# Patient Record
Sex: FEMALE | Race: WHITE | NOT HISPANIC OR LATINO | Employment: FULL TIME | URBAN - METROPOLITAN AREA
[De-identification: names, ages, dates, MRNs, and addresses within clinical notes are randomized per-mention and may not be internally consistent; named-entity substitution may affect disease eponyms.]

---

## 2017-07-03 ENCOUNTER — TELEPHONE (OUTPATIENT)
Dept: OBSTETRICS AND GYNECOLOGY | Facility: CLINIC | Age: 42
End: 2017-07-03

## 2017-07-03 DIAGNOSIS — Z30.41 ENCOUNTER FOR SURVEILLANCE OF CONTRACEPTIVE PILLS: Primary | ICD-10-CM

## 2017-07-03 RX ORDER — NORGESTIMATE AND ETHINYL ESTRADIOL 7DAYSX3 28
1 KIT ORAL DAILY
Qty: 84 TABLET | Refills: 0 | Status: SHIPPED | OUTPATIENT
Start: 2017-07-03 | End: 2017-11-20 | Stop reason: SDUPTHER

## 2017-07-03 NOTE — TELEPHONE ENCOUNTER
Refill request  For norg ee 3 packs; no refill    Well woman exam due in September    [phone]

## 2017-11-20 ENCOUNTER — OFFICE VISIT (OUTPATIENT)
Dept: OBSTETRICS AND GYNECOLOGY | Facility: CLINIC | Age: 42
End: 2017-11-20
Payer: COMMERCIAL

## 2017-11-20 VITALS
DIASTOLIC BLOOD PRESSURE: 109 MMHG | SYSTOLIC BLOOD PRESSURE: 171 MMHG | WEIGHT: 152.44 LBS | BODY MASS INDEX: 28.05 KG/M2 | HEIGHT: 62 IN

## 2017-11-20 DIAGNOSIS — Z30.41 ENCOUNTER FOR SURVEILLANCE OF CONTRACEPTIVE PILLS: ICD-10-CM

## 2017-11-20 DIAGNOSIS — Z12.31 SCREENING MAMMOGRAM, ENCOUNTER FOR: ICD-10-CM

## 2017-11-20 DIAGNOSIS — Z01.419 ENCOUNTER FOR GYNECOLOGICAL EXAMINATION (GENERAL) (ROUTINE) WITHOUT ABNORMAL FINDINGS: Primary | ICD-10-CM

## 2017-11-20 DIAGNOSIS — Z12.4 SCREENING FOR CERVICAL CANCER: ICD-10-CM

## 2017-11-20 PROCEDURE — 99999 PR PBB SHADOW E&M-EST. PATIENT-LVL II: CPT | Mod: PBBFAC,,, | Performed by: OBSTETRICS & GYNECOLOGY

## 2017-11-20 PROCEDURE — 99396 PREV VISIT EST AGE 40-64: CPT | Mod: S$GLB,,, | Performed by: OBSTETRICS & GYNECOLOGY

## 2017-11-20 PROCEDURE — 88175 CYTOPATH C/V AUTO FLUID REDO: CPT

## 2017-11-20 RX ORDER — LISINOPRIL 10 MG/1
10 TABLET ORAL
COMMUNITY
Start: 2017-07-28

## 2017-11-20 RX ORDER — NORGESTIMATE AND ETHINYL ESTRADIOL 7DAYSX3 28
1 KIT ORAL
COMMUNITY
Start: 2016-06-04 | End: 2019-02-11 | Stop reason: SDUPTHER

## 2017-11-20 RX ORDER — CITALOPRAM 10 MG/1
10 TABLET ORAL
COMMUNITY
Start: 2017-07-28 | End: 2020-06-15

## 2017-11-20 RX ORDER — NORGESTIMATE AND ETHINYL ESTRADIOL 7DAYSX3 28
1 KIT ORAL DAILY
Qty: 84 TABLET | Refills: 3 | Status: SHIPPED | OUTPATIENT
Start: 2017-11-20 | End: 2018-02-12

## 2017-11-20 NOTE — PROGRESS NOTES
Subjective:       Patient ID: Serina Barriga is a 42 y.o. female.    Chief Complaint:  Annual Exam      History of Present Illness  HPI  Annual Exam-Premenopausal  Patient presents for annual exam. The patient has no complaints today. The patient is sexually active--no pelvic pain. GYN screening history: last pap: approximate date  and was normal and last mammogram: approximate date  and was normal. The patient wears seatbelts: yes. The patient participates in regular exercise: yes.--walk 15-20 min; twice a day;  Has the patient ever been transfused or tattooed?: yes--tattooes; . The patient reports that there is not domestic violence in her life.    Menses with bleeding intermenstrually; spots for 1 day; menses q 28 d; flow3 days--pads-super; change q3-4 hr; worsening dysmenorrhea--sometimes every other months--relief with midol--worse on right side            GYN & OB HistoryPatient's last menstrual period was 2017 (approximate).   Date of Last Pap: No result found    OB History    Para Term  AB Living   2 2 2     2   SAB TAB Ectopic Multiple Live Births           2      # Outcome Date GA Lbr Doug/2nd Weight Sex Delivery Anes PTL Lv   2 Term      Vag-Spont   YUE   1 Term      Vag-Spont   YUE          Review of Systems  Review of Systems   Constitutional: Negative for activity change, appetite change, chills, diaphoresis, fatigue, fever and unexpected weight change.   HENT: Negative for mouth sores and tinnitus.    Eyes: Negative for discharge and visual disturbance.   Respiratory: Negative for cough, shortness of breath and wheezing.    Cardiovascular: Negative for chest pain, palpitations and leg swelling.   Gastrointestinal: Negative for abdominal pain, bloating, blood in stool, constipation, diarrhea, nausea and vomiting.   Endocrine: Negative for diabetes, hair loss, hot flashes, hyperthyroidism and hypothyroidism.   Genitourinary: Positive for menstrual problem. Negative for  decreased libido, dyspareunia, dysuria, flank pain, frequency, genital sores, hematuria, menorrhagia, pelvic pain, urgency, vaginal bleeding, vaginal discharge, vaginal pain, dysmenorrhea, urinary incontinence, postcoital bleeding, postmenopausal bleeding and vaginal odor.   Musculoskeletal: Negative for back pain and myalgias.   Skin:  Negative for rash, no acne and hair changes.   Neurological: Negative for seizures, syncope, numbness and headaches.   Hematological: Negative for adenopathy. Does not bruise/bleed easily.   Psychiatric/Behavioral: Negative for depression and sleep disturbance. The patient is not nervous/anxious.    Breast: Negative for breast mass, breast pain, nipple discharge and skin changes          Objective:    Physical Exam:   Constitutional: She appears well-developed.     Eyes: Conjunctivae and EOM are normal. Pupils are equal, round, and reactive to light.    Neck: Normal range of motion. Neck supple.     Pulmonary/Chest: Effort normal. Right breast exhibits no mass, no nipple discharge, no skin change and no tenderness. Left breast exhibits no mass, no nipple discharge, no skin change and no tenderness. Breasts are symmetrical.        Abdominal: Soft.     Genitourinary: Rectum normal and uterus normal. Pelvic exam was performed with patient supine. Cervix is normal. Right adnexum displays no mass and no tenderness. Left adnexum displays no mass and no tenderness. No erythema, bleeding, rectocele, cystocele or unspecified prolapse of vaginal walls in the vagina. Vaginal discharge (menstrual like blood in vagina) found. Labial bartholins normal.       Uterus Size: 6 cm   Musculoskeletal: Normal range of motion.       Neurological: She is alert.    Skin: Skin is warm.    Psychiatric: She has a normal mood and affect.          Assessment:     Encounter Diagnoses   Name Primary?    Encounter for gynecological examination (general) (routine) without abnormal findings Yes    Screening for  cervical cancer     Screening mammogram, encounter for     Encounter for surveillance of contraceptive pills              Plan:      Continue annual well woman exam.  Pap today, if wnl, due on 2020  Continue ocp as directed  [facility] for [facility]

## 2017-11-29 ENCOUNTER — TELEPHONE (OUTPATIENT)
Dept: OBSTETRICS AND GYNECOLOGY | Facility: CLINIC | Age: 42
End: 2017-11-29

## 2018-06-08 ENCOUNTER — TELEPHONE (OUTPATIENT)
Dept: OBSTETRICS AND GYNECOLOGY | Facility: CLINIC | Age: 43
End: 2018-06-08

## 2018-06-08 NOTE — TELEPHONE ENCOUNTER
Attempted to call pt, voicemail not setup, unable to leave message.     Calling concerning the status of Rx for birth control pills. Said the pharmacy called in the request.

## 2018-06-08 NOTE — TELEPHONE ENCOUNTER
----- Message from Rosie Borja sent at 6/8/2018 12:09 PM CDT -----  Contact: patient  Calling concerning the status of Rx for birth control pills. Said the pharmacy called in the request. Please call patient today ASAP. States she is leaving out of town today. Call [phone]. Thanks, ashley      [id number] [place]  [address] [facility]  [address] [facility] [address]  Phone: [phone] Fax: [phone]

## 2019-02-08 DIAGNOSIS — Z30.41 ENCOUNTER FOR SURVEILLANCE OF CONTRACEPTIVE PILLS: ICD-10-CM

## 2019-02-08 RX ORDER — NORGESTIMATE AND ETHINYL ESTRADIOL 7DAYSX3 28
1 KIT ORAL DAILY
Qty: 84 TABLET | Refills: 2 | OUTPATIENT
Start: 2019-02-08 | End: 2019-05-03

## 2019-02-11 ENCOUNTER — TELEPHONE (OUTPATIENT)
Dept: OBSTETRICS AND GYNECOLOGY | Facility: CLINIC | Age: 44
End: 2019-02-11

## 2019-02-11 DIAGNOSIS — Z30.41 ENCOUNTER FOR SURVEILLANCE OF CONTRACEPTIVE PILLS: Primary | ICD-10-CM

## 2019-02-11 RX ORDER — NORGESTIMATE AND ETHINYL ESTRADIOL 7DAYSX3 28
1 KIT ORAL DAILY
Qty: 28 TABLET | Refills: 1 | Status: SHIPPED | OUTPATIENT
Start: 2019-02-11 | End: 2019-02-18 | Stop reason: SDUPTHER

## 2019-02-11 NOTE — TELEPHONE ENCOUNTER
----- Message from Shu Denny sent at 2/11/2019  8:51 AM CST -----  Contact: self  Pt is requesting a refill on norgestimate-ethinyl estradiol (ORTHO TRI-CYCLEN,TRI-SPRINTEC) 0.18/0.215/0.25 mg-35 mcg (28) tablet. Pt is using [facility]/pharmacy #[id number] - [place], [place] - [address] [address] [facility].    Pt can be reached at [phone].    Thank you

## 2019-02-11 NOTE — TELEPHONE ENCOUNTER
Spoke to pt,     Pt is requesting a refill on norgestimate-ethinyl estradiol (ORTHO TRI-CYCLEN,TRI-SPRINTEC) 0.18/0.215/0.25 mg-35 mcg (28) tablet. Pt is using [facility]/pharmacy #[id number] - [place], [place] - [address] [address] [facility].      Pt states she will run out of pills on Sat 2/16/19. Annual is scheduled for 2/18/19. Will send message to Dr. Sanon.     Pt verbalized understanding.

## 2019-02-18 ENCOUNTER — OFFICE VISIT (OUTPATIENT)
Dept: OBSTETRICS AND GYNECOLOGY | Facility: CLINIC | Age: 44
End: 2019-02-18
Payer: COMMERCIAL

## 2019-02-18 VITALS
DIASTOLIC BLOOD PRESSURE: 78 MMHG | BODY MASS INDEX: 27.99 KG/M2 | SYSTOLIC BLOOD PRESSURE: 132 MMHG | WEIGHT: 152.13 LBS | HEIGHT: 62 IN

## 2019-02-18 DIAGNOSIS — Z01.419 ENCOUNTER FOR GYNECOLOGICAL EXAMINATION (GENERAL) (ROUTINE) WITHOUT ABNORMAL FINDINGS: Primary | ICD-10-CM

## 2019-02-18 DIAGNOSIS — Z30.41 ENCOUNTER FOR SURVEILLANCE OF CONTRACEPTIVE PILLS: ICD-10-CM

## 2019-02-18 DIAGNOSIS — Z12.4 SCREENING FOR CERVICAL CANCER: ICD-10-CM

## 2019-02-18 DIAGNOSIS — Z12.31 SCREENING MAMMOGRAM, ENCOUNTER FOR: ICD-10-CM

## 2019-02-18 PROCEDURE — 99999 PR PBB SHADOW E&M-EST. PATIENT-LVL III: ICD-10-PCS | Mod: PBBFAC,,, | Performed by: OBSTETRICS & GYNECOLOGY

## 2019-02-18 PROCEDURE — 99396 PREV VISIT EST AGE 40-64: CPT | Mod: S$GLB,,, | Performed by: OBSTETRICS & GYNECOLOGY

## 2019-02-18 PROCEDURE — 99396 PR PREVENTIVE VISIT,EST,40-64: ICD-10-PCS | Mod: S$GLB,,, | Performed by: OBSTETRICS & GYNECOLOGY

## 2019-02-18 PROCEDURE — 99999 PR PBB SHADOW E&M-EST. PATIENT-LVL III: CPT | Mod: PBBFAC,,, | Performed by: OBSTETRICS & GYNECOLOGY

## 2019-02-18 RX ORDER — NORGESTIMATE AND ETHINYL ESTRADIOL 7DAYSX3 28
1 KIT ORAL
COMMUNITY
Start: 2016-06-04 | End: 2019-02-18 | Stop reason: SDUPTHER

## 2019-02-18 RX ORDER — NORGESTIMATE AND ETHINYL ESTRADIOL 7DAYSX3 28
1 KIT ORAL DAILY
Qty: 84 TABLET | Refills: 3 | Status: SHIPPED | OUTPATIENT
Start: 2019-02-18 | End: 2020-02-03

## 2019-02-18 RX ORDER — LISINOPRIL 10 MG/1
10 TABLET ORAL
COMMUNITY
Start: 2018-10-04 | End: 2019-02-18 | Stop reason: SDUPTHER

## 2019-02-18 NOTE — PROGRESS NOTES
Subjective:       Patient ID: Serina Barriga is a 43 y.o. female.    Chief Complaint:  Well Woman      History of Present Illness  HPI  Annual Exam-Premenopausal  Patient presents for annual exam. The patient has no complaints today. The patient is sexually active---denies pelvic pain--celebrating 21 yrs of marriage!. GYN screening history: last pap: approximate date 2017 and was normal and last mammogram: approximate date 2018 and was normal. The patient wears seatbelts: yes. The patient participates in regular exercise: yes--ymca, yoga 2 /wk; cycling; . Has the patient ever been transfused or tattooed?: yes.--tattooe;  The patient reports that there is not domestic violence in her life.    Menses monthly; flow 5-6 days with 2-3 days of spotting; reports spotting on 3rd wk of pill pack; pads-reg; q 3 hrs; min dysmenorrhea;     Denies bladder leakage  GYN & OB History  Patient's last menstrual period was 2019 (approximate).   Date of Last Pap: 2017    OB History    Para Term  AB Living   2 2 2     2   SAB TAB Ectopic Multiple Live Births           2      # Outcome Date GA Lbr Doug/2nd Weight Sex Delivery Anes PTL Lv   2 Term      Vag-Spont   YUE   1 Term      Vag-Spont   YUE          Review of Systems  Review of Systems   Genitourinary: Positive for menstrual problem.   All other systems reviewed and are negative.          Objective:      Physical Exam:   Constitutional: She appears well-developed.     Eyes: Conjunctivae and EOM are normal. Pupils are equal, round, and reactive to light.    Neck: Normal range of motion. Neck supple.     Pulmonary/Chest: Effort normal. Right breast exhibits no mass, no nipple discharge, no skin change and no tenderness. Left breast exhibits no mass, no nipple discharge, no skin change and no tenderness. Breasts are symmetrical.        Abdominal: Soft.     Genitourinary: Rectum normal, vagina normal and uterus normal. Pelvic exam was performed with  patient supine. Cervix is normal. Right adnexum displays no mass and no tenderness. Left adnexum displays no mass and no tenderness. No erythema, bleeding, rectocele, cystocele or unspecified prolapse of vaginal walls in the vagina. No vaginal discharge found. Labial bartholins normal.Cervix exhibits no motion tenderness and no friability. Also,  no recent pap smear         Uterus Size: 6 cm   Musculoskeletal: Normal range of motion.       Neurological: She is alert.    Skin: Skin is warm.    Psychiatric: She has a normal mood and affect.           Assessment:     Encounter Diagnoses   Name Primary?    Encounter for surveillance of contraceptive pills     Encounter for gynecological examination (general) (routine) without abnormal findings Yes    Screening for cervical cancer     Screening mammogram, encounter for                  Plan:      Continue annual well woman exam.  Pap 2017, due in 2020; Reviewed updated recommendations for pap smears (every 3 years) in low risk patients.   Recommend annual pelvic exams.  Reviewed recommendations for annual CBE.  mammo ordered, continue yearly until age 76  Continue diet, exercise, weight loss   continue ocp as directed; pt to stop active pills if she starts early withdrawal bleed and start new pack of pills on Sunday  If abnl bleeding persists can change ocp or will need pelvic sono

## 2020-02-02 DIAGNOSIS — Z30.41 ENCOUNTER FOR SURVEILLANCE OF CONTRACEPTIVE PILLS: ICD-10-CM

## 2020-02-03 RX ORDER — NORGESTIMATE AND ETHINYL ESTRADIOL 7DAYSX3 28
1 KIT ORAL DAILY
Qty: 84 TABLET | Refills: 0 | Status: SHIPPED | OUTPATIENT
Start: 2020-02-03 | End: 2020-04-29

## 2020-04-29 DIAGNOSIS — Z30.41 ENCOUNTER FOR SURVEILLANCE OF CONTRACEPTIVE PILLS: ICD-10-CM

## 2020-04-29 RX ORDER — NORGESTIMATE AND ETHINYL ESTRADIOL 7DAYSX3 28
1 KIT ORAL DAILY
Qty: 84 TABLET | Refills: 0 | Status: SHIPPED | OUTPATIENT
Start: 2020-04-29 | End: 2020-06-15 | Stop reason: SDUPTHER

## 2020-04-29 NOTE — TELEPHONE ENCOUNTER
Spoke with pt. Notified pt that rx has been sent and assisted pt in scheduling ww exam in June. Pt verbalized understanding of appt date and time. NORI Viera

## 2020-06-15 ENCOUNTER — OFFICE VISIT (OUTPATIENT)
Dept: OBSTETRICS AND GYNECOLOGY | Facility: CLINIC | Age: 45
End: 2020-06-15
Payer: COMMERCIAL

## 2020-06-15 VITALS
HEIGHT: 62 IN | DIASTOLIC BLOOD PRESSURE: 74 MMHG | WEIGHT: 145.5 LBS | SYSTOLIC BLOOD PRESSURE: 126 MMHG | BODY MASS INDEX: 26.78 KG/M2

## 2020-06-15 DIAGNOSIS — Z30.41 ENCOUNTER FOR SURVEILLANCE OF CONTRACEPTIVE PILLS: ICD-10-CM

## 2020-06-15 DIAGNOSIS — Z12.31 SCREENING MAMMOGRAM, ENCOUNTER FOR: ICD-10-CM

## 2020-06-15 DIAGNOSIS — Z01.419 ENCOUNTER FOR GYNECOLOGICAL EXAMINATION (GENERAL) (ROUTINE) WITHOUT ABNORMAL FINDINGS: Primary | ICD-10-CM

## 2020-06-15 DIAGNOSIS — Z12.4 SCREENING FOR CERVICAL CANCER: ICD-10-CM

## 2020-06-15 PROCEDURE — 99396 PR PREVENTIVE VISIT,EST,40-64: ICD-10-PCS | Mod: S$GLB,,, | Performed by: OBSTETRICS & GYNECOLOGY

## 2020-06-15 PROCEDURE — 88175 CYTOPATH C/V AUTO FLUID REDO: CPT

## 2020-06-15 PROCEDURE — 87624 HPV HI-RISK TYP POOLED RSLT: CPT

## 2020-06-15 PROCEDURE — 99999 PR PBB SHADOW E&M-EST. PATIENT-LVL II: ICD-10-PCS | Mod: PBBFAC,,, | Performed by: OBSTETRICS & GYNECOLOGY

## 2020-06-15 PROCEDURE — 99999 PR PBB SHADOW E&M-EST. PATIENT-LVL II: CPT | Mod: PBBFAC,,, | Performed by: OBSTETRICS & GYNECOLOGY

## 2020-06-15 PROCEDURE — 99396 PREV VISIT EST AGE 40-64: CPT | Mod: S$GLB,,, | Performed by: OBSTETRICS & GYNECOLOGY

## 2020-06-15 RX ORDER — SERTRALINE HYDROCHLORIDE 25 MG/1
25 TABLET, FILM COATED ORAL DAILY
COMMUNITY
Start: 2020-05-22

## 2020-06-15 RX ORDER — NORGESTIMATE AND ETHINYL ESTRADIOL 7DAYSX3 28
1 KIT ORAL DAILY
Qty: 90 TABLET | Refills: 4 | Status: SHIPPED | OUTPATIENT
Start: 2020-06-15 | End: 2021-07-07 | Stop reason: SDUPTHER

## 2020-06-15 NOTE — PROGRESS NOTES
Subjective:       Patient ID: Serina Barriga is a 45 y.o. female.    Chief Complaint:  No chief complaint on file.      History of Present Illness  HPI  Annual Exam-Premenopausal  Patient presents for annual exam. The patient has no complaints today. The patient is sexually active. GYN screening history: last pap: approximate date  and was normal and last mammogram: was normal and patient does not recall when last mammogram was done. The patient wears seatbelts: yes. The patient participates in regular exercise: yes.--lost on weight watchers, plans to resume--yoga and walks dog; ;  Has the patient ever been transfused or tattooed?: yes--+tatooe. The patient reports that there is not domestic violence in her life.    Menses monthly on ocp, flow 5 days, reg; change q3-4 hrs; no double up; tolerable dysmenorrhea;           GYN & OB History  Patient's last menstrual period was 2020.   Date of Last Pap: 2017    OB History    Para Term  AB Living   2 2 2     2   SAB TAB Ectopic Multiple Live Births           2      # Outcome Date GA Lbr Doug/2nd Weight Sex Delivery Anes PTL Lv   2 Term      Vag-Spont   YUE   1 Term      Vag-Spont   YUE       Review of Systems  Review of Systems   All other systems reviewed and are negative.          Objective:      Physical Exam:   Constitutional: She appears well-developed.     Eyes: Pupils are equal, round, and reactive to light. Conjunctivae and EOM are normal.    Neck: Normal range of motion. Neck supple.     Pulmonary/Chest: Effort normal. Right breast exhibits no mass, no nipple discharge, no skin change and no tenderness. Left breast exhibits no mass, no nipple discharge, no skin change and no tenderness. Breasts are symmetrical.        Abdominal: Soft.     Genitourinary:    Vagina, uterus, right adnexa, left adnexa and rectum normal.      Pelvic exam was performed with patient supine.   Cervix is normal. There is a normal right adnexa and a  normal left adnexa. Right adnexum displays no mass and no tenderness. Left adnexum displays no mass and no tenderness. No erythema, bleeding, rectocele, cystocele or unspecified prolapse of vaginal walls in the vagina. Labial bartholins normal.Cervix exhibits friability.        Uterus Size: 6 cm   Musculoskeletal: Normal range of motion.       Neurological: She is alert.    Skin: Skin is warm.    Psychiatric: She has a normal mood and affect.           Assessment:        Encounter Diagnoses   Name Primary?    Encounter for gynecological examination (general) (routine) without abnormal findings Yes    Screening for cervical cancer     Encounter for surveillance of contraceptive pills     Screening mammogram, encounter for                Plan:      Continue annual well woman exam.  Pap today; Reviewed updated recommendations for pap smears (every 3 years) in low risk patients.   Recommend annual pelvic exams.  Reviewed recommendations for annual CBE.  mammo recommended, continue yearly until age 76; [facility] at [facility]  Continue diet, exercise, weight loss  Continue ocp as directed  Continue menstrual calendar

## 2020-06-19 LAB
FINAL PATHOLOGIC DIAGNOSIS: NORMAL
Lab: NORMAL

## 2020-06-22 LAB
HPV HR 12 DNA SPEC QL NAA+PROBE: NEGATIVE
HPV16 AG SPEC QL: NEGATIVE
HPV18 DNA SPEC QL NAA+PROBE: NEGATIVE

## 2021-06-22 DIAGNOSIS — Z30.41 ENCOUNTER FOR SURVEILLANCE OF CONTRACEPTIVE PILLS: ICD-10-CM

## 2021-06-23 RX ORDER — NORGESTIMATE AND ETHINYL ESTRADIOL 7DAYSX3 28
KIT ORAL
Qty: 84 TABLET | Refills: 4 | OUTPATIENT
Start: 2021-06-23

## 2021-07-06 ENCOUNTER — PATIENT MESSAGE (OUTPATIENT)
Dept: OBSTETRICS AND GYNECOLOGY | Facility: CLINIC | Age: 46
End: 2021-07-06

## 2021-07-06 DIAGNOSIS — Z30.41 ENCOUNTER FOR SURVEILLANCE OF CONTRACEPTIVE PILLS: ICD-10-CM

## 2021-07-07 DIAGNOSIS — Z30.41 ENCOUNTER FOR SURVEILLANCE OF CONTRACEPTIVE PILLS: ICD-10-CM

## 2021-07-07 RX ORDER — NORGESTIMATE AND ETHINYL ESTRADIOL 7DAYSX3 28
1 KIT ORAL DAILY
Qty: 90 TABLET | Refills: 0 | Status: SHIPPED | OUTPATIENT
Start: 2021-07-07 | End: 2021-09-20 | Stop reason: SDUPTHER

## 2021-07-08 RX ORDER — NORGESTIMATE AND ETHINYL ESTRADIOL 7DAYSX3 28
1 KIT ORAL DAILY
Qty: 90 TABLET | Refills: 4 | OUTPATIENT
Start: 2021-07-08 | End: 2022-07-08

## 2021-09-16 ENCOUNTER — PATIENT MESSAGE (OUTPATIENT)
Dept: OBSTETRICS AND GYNECOLOGY | Facility: CLINIC | Age: 46
End: 2021-09-16

## 2021-09-16 DIAGNOSIS — Z30.41 ENCOUNTER FOR SURVEILLANCE OF CONTRACEPTIVE PILLS: ICD-10-CM

## 2021-09-20 RX ORDER — NORGESTIMATE AND ETHINYL ESTRADIOL 7DAYSX3 28
1 KIT ORAL DAILY
Qty: 90 TABLET | Refills: 0 | Status: SHIPPED | OUTPATIENT
Start: 2021-09-20 | End: 2021-09-30 | Stop reason: SDUPTHER

## 2021-09-30 ENCOUNTER — OFFICE VISIT (OUTPATIENT)
Dept: OBSTETRICS AND GYNECOLOGY | Facility: CLINIC | Age: 46
End: 2021-09-30
Payer: COMMERCIAL

## 2021-09-30 VITALS
SYSTOLIC BLOOD PRESSURE: 112 MMHG | BODY MASS INDEX: 29.17 KG/M2 | DIASTOLIC BLOOD PRESSURE: 62 MMHG | HEIGHT: 62 IN | WEIGHT: 158.5 LBS

## 2021-09-30 DIAGNOSIS — Z30.41 ENCOUNTER FOR SURVEILLANCE OF CONTRACEPTIVE PILLS: ICD-10-CM

## 2021-09-30 DIAGNOSIS — Z01.419 ENCOUNTER FOR GYNECOLOGICAL EXAMINATION (GENERAL) (ROUTINE) WITHOUT ABNORMAL FINDINGS: Primary | ICD-10-CM

## 2021-09-30 DIAGNOSIS — Z12.31 SCREENING MAMMOGRAM, ENCOUNTER FOR: ICD-10-CM

## 2021-09-30 DIAGNOSIS — Z12.4 SCREENING FOR CERVICAL CANCER: ICD-10-CM

## 2021-09-30 PROCEDURE — 3078F PR MOST RECENT DIASTOLIC BLOOD PRESSURE < 80 MM HG: ICD-10-PCS | Mod: CPTII,S$GLB,, | Performed by: OBSTETRICS & GYNECOLOGY

## 2021-09-30 PROCEDURE — 3074F PR MOST RECENT SYSTOLIC BLOOD PRESSURE < 130 MM HG: ICD-10-PCS | Mod: CPTII,S$GLB,, | Performed by: OBSTETRICS & GYNECOLOGY

## 2021-09-30 PROCEDURE — 99396 PREV VISIT EST AGE 40-64: CPT | Mod: S$GLB,,, | Performed by: OBSTETRICS & GYNECOLOGY

## 2021-09-30 PROCEDURE — 3078F DIAST BP <80 MM HG: CPT | Mod: CPTII,S$GLB,, | Performed by: OBSTETRICS & GYNECOLOGY

## 2021-09-30 PROCEDURE — 99999 PR PBB SHADOW E&M-EST. PATIENT-LVL III: ICD-10-PCS | Mod: PBBFAC,,, | Performed by: OBSTETRICS & GYNECOLOGY

## 2021-09-30 PROCEDURE — 99999 PR PBB SHADOW E&M-EST. PATIENT-LVL III: CPT | Mod: PBBFAC,,, | Performed by: OBSTETRICS & GYNECOLOGY

## 2021-09-30 PROCEDURE — 4010F ACE/ARB THERAPY RXD/TAKEN: CPT | Mod: CPTII,S$GLB,, | Performed by: OBSTETRICS & GYNECOLOGY

## 2021-09-30 PROCEDURE — 3074F SYST BP LT 130 MM HG: CPT | Mod: CPTII,S$GLB,, | Performed by: OBSTETRICS & GYNECOLOGY

## 2021-09-30 PROCEDURE — 1160F RVW MEDS BY RX/DR IN RCRD: CPT | Mod: CPTII,S$GLB,, | Performed by: OBSTETRICS & GYNECOLOGY

## 2021-09-30 PROCEDURE — 1160F PR REVIEW ALL MEDS BY PRESCRIBER/CLIN PHARMACIST DOCUMENTED: ICD-10-PCS | Mod: CPTII,S$GLB,, | Performed by: OBSTETRICS & GYNECOLOGY

## 2021-09-30 PROCEDURE — 4010F PR ACE/ARB THEARPY RXD/TAKEN: ICD-10-PCS | Mod: CPTII,S$GLB,, | Performed by: OBSTETRICS & GYNECOLOGY

## 2021-09-30 PROCEDURE — 3008F PR BODY MASS INDEX (BMI) DOCUMENTED: ICD-10-PCS | Mod: CPTII,S$GLB,, | Performed by: OBSTETRICS & GYNECOLOGY

## 2021-09-30 PROCEDURE — 1159F MED LIST DOCD IN RCRD: CPT | Mod: CPTII,S$GLB,, | Performed by: OBSTETRICS & GYNECOLOGY

## 2021-09-30 PROCEDURE — 99396 PR PREVENTIVE VISIT,EST,40-64: ICD-10-PCS | Mod: S$GLB,,, | Performed by: OBSTETRICS & GYNECOLOGY

## 2021-09-30 PROCEDURE — 1159F PR MEDICATION LIST DOCUMENTED IN MEDICAL RECORD: ICD-10-PCS | Mod: CPTII,S$GLB,, | Performed by: OBSTETRICS & GYNECOLOGY

## 2021-09-30 PROCEDURE — 3008F BODY MASS INDEX DOCD: CPT | Mod: CPTII,S$GLB,, | Performed by: OBSTETRICS & GYNECOLOGY

## 2021-09-30 RX ORDER — NORGESTIMATE AND ETHINYL ESTRADIOL 7DAYSX3 28
1 KIT ORAL DAILY
Qty: 84 TABLET | Refills: 3 | Status: SHIPPED | OUTPATIENT
Start: 2021-09-30 | End: 2022-11-17 | Stop reason: SDUPTHER

## 2022-11-17 ENCOUNTER — TELEPHONE (OUTPATIENT)
Dept: OBSTETRICS AND GYNECOLOGY | Facility: CLINIC | Age: 47
End: 2022-11-17
Payer: COMMERCIAL

## 2022-11-17 DIAGNOSIS — Z30.41 ENCOUNTER FOR SURVEILLANCE OF CONTRACEPTIVE PILLS: ICD-10-CM

## 2022-11-17 RX ORDER — NORGESTIMATE AND ETHINYL ESTRADIOL 7DAYSX3 28
1 KIT ORAL DAILY
Qty: 84 TABLET | Refills: 0 | Status: SHIPPED | OUTPATIENT
Start: 2022-11-17 | End: 2022-11-29 | Stop reason: SDUPTHER

## 2022-11-17 NOTE — TELEPHONE ENCOUNTER
----- Message from Yadira Jolley LPN sent at 11/16/2022  4:03 PM CST -----  Regarding: bc refill  Pt needs refill to hold her over, sched annual with Gini 11/29. States she will be out of bc by then.

## 2022-11-29 ENCOUNTER — OFFICE VISIT (OUTPATIENT)
Dept: OBSTETRICS AND GYNECOLOGY | Facility: CLINIC | Age: 47
End: 2022-11-29
Payer: COMMERCIAL

## 2022-11-29 VITALS
SYSTOLIC BLOOD PRESSURE: 132 MMHG | BODY MASS INDEX: 28.21 KG/M2 | WEIGHT: 153.31 LBS | DIASTOLIC BLOOD PRESSURE: 88 MMHG | HEIGHT: 62 IN

## 2022-11-29 DIAGNOSIS — Z12.11 SCREENING FOR COLON CANCER: ICD-10-CM

## 2022-11-29 DIAGNOSIS — Z01.419 ENCOUNTER FOR GYNECOLOGICAL EXAMINATION WITHOUT ABNORMAL FINDING: Primary | ICD-10-CM

## 2022-11-29 DIAGNOSIS — Z30.41 ENCOUNTER FOR SURVEILLANCE OF CONTRACEPTIVE PILLS: ICD-10-CM

## 2022-11-29 DIAGNOSIS — R10.31 RLQ ABDOMINAL PAIN: ICD-10-CM

## 2022-11-29 DIAGNOSIS — Z12.39 ENCOUNTER FOR SCREENING FOR MALIGNANT NEOPLASM OF BREAST, UNSPECIFIED SCREENING MODALITY: ICD-10-CM

## 2022-11-29 PROBLEM — I10 PRIMARY HYPERTENSION: Status: ACTIVE | Noted: 2022-11-29

## 2022-11-29 PROBLEM — F33.1 MODERATE EPISODE OF RECURRENT MAJOR DEPRESSIVE DISORDER: Status: ACTIVE | Noted: 2022-11-29

## 2022-11-29 PROCEDURE — 4010F PR ACE/ARB THEARPY RXD/TAKEN: ICD-10-PCS | Mod: CPTII,S$GLB,, | Performed by: NURSE PRACTITIONER

## 2022-11-29 PROCEDURE — 4010F ACE/ARB THERAPY RXD/TAKEN: CPT | Mod: CPTII,S$GLB,, | Performed by: NURSE PRACTITIONER

## 2022-11-29 PROCEDURE — 99999 PR PBB SHADOW E&M-EST. PATIENT-LVL IV: CPT | Mod: PBBFAC,,, | Performed by: NURSE PRACTITIONER

## 2022-11-29 PROCEDURE — 3079F DIAST BP 80-89 MM HG: CPT | Mod: CPTII,S$GLB,, | Performed by: NURSE PRACTITIONER

## 2022-11-29 PROCEDURE — 1159F PR MEDICATION LIST DOCUMENTED IN MEDICAL RECORD: ICD-10-PCS | Mod: CPTII,S$GLB,, | Performed by: NURSE PRACTITIONER

## 2022-11-29 PROCEDURE — 3008F BODY MASS INDEX DOCD: CPT | Mod: CPTII,S$GLB,, | Performed by: NURSE PRACTITIONER

## 2022-11-29 PROCEDURE — 99396 PREV VISIT EST AGE 40-64: CPT | Mod: S$GLB,,, | Performed by: NURSE PRACTITIONER

## 2022-11-29 PROCEDURE — 3008F PR BODY MASS INDEX (BMI) DOCUMENTED: ICD-10-PCS | Mod: CPTII,S$GLB,, | Performed by: NURSE PRACTITIONER

## 2022-11-29 PROCEDURE — 99999 PR PBB SHADOW E&M-EST. PATIENT-LVL IV: ICD-10-PCS | Mod: PBBFAC,,, | Performed by: NURSE PRACTITIONER

## 2022-11-29 PROCEDURE — 3075F PR MOST RECENT SYSTOLIC BLOOD PRESS GE 130-139MM HG: ICD-10-PCS | Mod: CPTII,S$GLB,, | Performed by: NURSE PRACTITIONER

## 2022-11-29 PROCEDURE — 88175 CYTOPATH C/V AUTO FLUID REDO: CPT | Performed by: NURSE PRACTITIONER

## 2022-11-29 PROCEDURE — 3075F SYST BP GE 130 - 139MM HG: CPT | Mod: CPTII,S$GLB,, | Performed by: NURSE PRACTITIONER

## 2022-11-29 PROCEDURE — 3079F PR MOST RECENT DIASTOLIC BLOOD PRESSURE 80-89 MM HG: ICD-10-PCS | Mod: CPTII,S$GLB,, | Performed by: NURSE PRACTITIONER

## 2022-11-29 PROCEDURE — 87624 HPV HI-RISK TYP POOLED RSLT: CPT | Performed by: NURSE PRACTITIONER

## 2022-11-29 PROCEDURE — 1160F PR REVIEW ALL MEDS BY PRESCRIBER/CLIN PHARMACIST DOCUMENTED: ICD-10-PCS | Mod: CPTII,S$GLB,, | Performed by: NURSE PRACTITIONER

## 2022-11-29 PROCEDURE — 1160F RVW MEDS BY RX/DR IN RCRD: CPT | Mod: CPTII,S$GLB,, | Performed by: NURSE PRACTITIONER

## 2022-11-29 PROCEDURE — 99396 PR PREVENTIVE VISIT,EST,40-64: ICD-10-PCS | Mod: S$GLB,,, | Performed by: NURSE PRACTITIONER

## 2022-11-29 PROCEDURE — 1159F MED LIST DOCD IN RCRD: CPT | Mod: CPTII,S$GLB,, | Performed by: NURSE PRACTITIONER

## 2022-11-29 RX ORDER — NORGESTIMATE AND ETHINYL ESTRADIOL 7DAYSX3 28
1 KIT ORAL DAILY
Qty: 84 TABLET | Refills: 4 | Status: SHIPPED | OUTPATIENT
Start: 2022-11-29 | End: 2022-12-23

## 2022-11-29 NOTE — PROGRESS NOTES
Subjective:       Patient ID: Serina Barriga is a 47 y.o. female.    Chief Complaint:  Annual Exam      History of Present Illness  HPI  Annual Exam-Premenopausal   presents for annual exam.  The patient is sexually active with her huxband x25y; no complaints.. GYN screening history: last pap: approximate date 6/15/2020 and was normal and last mammogram: approximate date 2017 and was normal.  Normal pap hx.  The patient wears seatbelts: yes. The patient participates in regular exercise: yes. Has the patient ever been transfused or tattooed?: yes. X1 tattoo.  The patient reports that there is not domestic violence in her life.    RLQ pain since September; hx of cysts twice; dull pain, daily; 11/10-stabbing pain RLQ with spotting; menses started 3d later than usual and was heavier for two days only; experienced again  debilitating sharp pain; no dyspareunia; no n/v/d/c; no appetite changes.  Appetite and heating pad help.    Menses the last week of pills x4d; very light flow with no cramping.  Needs refill on pills.    GYN & OB History  Patient's last menstrual period was 2022 (exact date).   Date of Last Pap: 2020    OB History    Para Term  AB Living   2 2 2     2   SAB IAB Ectopic Multiple Live Births           2      # Outcome Date GA Lbr Doug/2nd Weight Sex Delivery Anes PTL Lv   2 Term      Vag-Spont   YUE   1 Term      Vag-Spont   YUE       Review of Systems  Review of Systems   Constitutional:  Negative for activity change, appetite change, chills, fatigue and fever.   HENT:  Negative for nasal congestion and mouth sores.    Respiratory:  Negative for cough, shortness of breath and wheezing.    Cardiovascular:  Negative for chest pain.   Gastrointestinal:  Negative for abdominal pain, constipation, diarrhea, nausea and vomiting.   Endocrine: Negative for hair loss and hot flashes.   Genitourinary:  Positive for menstrual problem and pelvic pain. Negative for bladder  incontinence, decreased libido, dysmenorrhea, dyspareunia, dysuria, frequency, genital sores, menorrhagia, urgency, vaginal discharge, vaginal pain, urinary incontinence, postcoital bleeding and vaginal odor.   Musculoskeletal:  Negative for back pain.   Integumentary:  Negative for breast mass, nipple discharge, breast skin changes and breast tenderness.   Neurological:  Negative for headaches.   Hematological:  Negative for adenopathy.   Psychiatric/Behavioral:  Negative for depression. The patient is not nervous/anxious.    All other systems reviewed and are negative.  Breast: Negative for breast self exam, lump, mass, mastodynia, nipple discharge, skin changes and tenderness        Objective:      Physical Exam:   Constitutional: She is oriented to person, place, and time. She appears well-developed and well-nourished. No distress.    HENT:   Head: Normocephalic and atraumatic.   Nose: Nose normal.    Eyes: Pupils are equal, round, and reactive to light. Conjunctivae and EOM are normal. Right eye exhibits no discharge. Left eye exhibits no discharge.     Cardiovascular:  Normal rate, regular rhythm and normal heart sounds.      Exam reveals no gallop, no friction rub, no clubbing, no cyanosis and no edema.       No murmur heard.   Pulmonary/Chest: Effort normal and breath sounds normal. No respiratory distress. She has no decreased breath sounds. She has no wheezes. She has no rhonchi. She has no rales. She exhibits no tenderness. Right breast exhibits no inverted nipple, no mass, no nipple discharge, no skin change, no tenderness, no bleeding and no swelling. Left breast exhibits no inverted nipple, no mass, no nipple discharge, no skin change, no tenderness, no bleeding and no swelling. Breasts are symmetrical.        Abdominal: Soft. Bowel sounds are normal. She exhibits no distension. There is no abdominal tenderness. There is no rebound and no guarding. Hernia confirmed negative in the right inguinal area  and confirmed negative in the left inguinal area.     Genitourinary:    Inguinal canal, vagina, uterus and right adnexa normal.   Rectum:      No external hemorrhoid.      Pelvic exam was performed with patient supine.   The external female genitalia was normal.   No external genitalia lesions identified,Genitalia hair distrobution normal .   Labial bartholins normal.There is no rash, tenderness, lesion or injury on the right labia. There is no rash, tenderness, lesion or injury on the left labia. Cervix is normal. Right adnexum displays no mass, no tenderness and no fullness. Left adnexum displays fullness. Left adnexum displays no mass and no tenderness. No erythema,  no vaginal discharge, tenderness or bleeding in the vagina.    No foreign body in the vagina.      No signs of injury in the vagina.   Vagina was moist.Cervix exhibits no motion tenderness, no lesion, no discharge, no friability, no lesion, no tenderness and no polyp.    pap smear completedUerus contour normal  Uterus is not enlarged and not tender. Uterus size: 10 cm.Normal urethral meatus.Urethral Meatus exhibits: urethral lesion and prolapsedUrethra findings: no urethral mass, no tenderness and no urethral scarringBladder findings: no bladder distention and no bladder tenderness          Musculoskeletal: Normal range of motion and moves all extremeties.      Lymphadenopathy: No inguinal adenopathy noted on the right or left side.    Neurological: She is alert and oriented to person, place, and time.    Skin: Skin is warm and dry. No rash noted. She is not diaphoretic. No cyanosis or erythema. No pallor. Nails show no clubbing.    Psychiatric: She has a normal mood and affect. Her speech is normal and behavior is normal. Judgment and thought content normal.           Assessment:        1. Encounter for gynecological examination without abnormal finding    2. Encounter for screening for malignant neoplasm of breast, unspecified screening modality     3. Encounter for surveillance of contraceptive pills    4. RLQ abdominal pain    5. Screening for colon cancer               Plan:   Discussed risks of DVT development with birth control use.  Pt denies history of blood clots, DVT, cardiac issues, HTN, CVA, gallbladder disease, liver disease, smoking, migraines with an aura, or adrenal disease.  Pt denies family hx of DVT.    Refill sent for tri sprintec    U/s and mmg ordered  Methods to manage pain discussed -- NSAIDs with food, warm soaks/compresses.    Discussed colonoscopy and cologard with risks and benefits; pt interested in cologard; orders entered    Reviewed updated recommendations for pap smears (every 3 years) in low risk patients.  Recommend annual pelvic exams.  Reviewed recommendations for annual CBE.  Next pap due in 2025.   RTC 1 year or sooner prn.  To PCP for other health maintenance.      Encounter for gynecological examination without abnormal finding  -     Mammo Digital Screening Bilat w/ Lee; Future; Expected date: 11/29/2022  -     norgestimate-ethinyl estradioL (ORTHO TRI-CYCLEN,TRI-SPRINTEC) 0.18/0.215/0.25 mg-35 mcg (28) tablet; Take 1 tablet by mouth once daily.  Dispense: 84 tablet; Refill: 4  -     US Pelvis Comp with Transvag NON-OB (xpd; Future; Expected date: 11/29/2022    Encounter for screening for malignant neoplasm of breast, unspecified screening modality  -     Mammo Digital Screening Bilat w/ Lee; Future; Expected date: 11/29/2022    Encounter for surveillance of contraceptive pills  -     norgestimate-ethinyl estradioL (ORTHO TRI-CYCLEN,TRI-SPRINTEC) 0.18/0.215/0.25 mg-35 mcg (28) tablet; Take 1 tablet by mouth once daily.  Dispense: 84 tablet; Refill: 4    RLQ abdominal pain  -     US Pelvis Comp with Transvag NON-OB (xpd; Future; Expected date: 11/29/2022    Screening for colon cancer  -     Cologuard Screening (Multitarget Stool DNA); Future; Expected date: 11/29/2022

## 2022-12-02 ENCOUNTER — HOSPITAL ENCOUNTER (OUTPATIENT)
Dept: RADIOLOGY | Facility: HOSPITAL | Age: 47
Discharge: HOME | End: 2022-12-02
Attending: NURSE PRACTITIONER
Payer: COMMERCIAL

## 2022-12-02 DIAGNOSIS — Z01.419 ENCOUNTER FOR GYNECOLOGICAL EXAMINATION WITHOUT ABNORMAL FINDING: ICD-10-CM

## 2022-12-02 DIAGNOSIS — R10.31 RLQ ABDOMINAL PAIN: ICD-10-CM

## 2022-12-02 PROCEDURE — 76830 TRANSVAGINAL US NON-OB: CPT | Mod: 26,,, | Performed by: RADIOLOGY

## 2022-12-02 PROCEDURE — 76856 US EXAM PELVIC COMPLETE: CPT | Mod: TC

## 2022-12-02 PROCEDURE — 76856 US PELVIS COMP WITH TRANSVAG NON-OB (XPD): ICD-10-PCS | Mod: 26,,, | Performed by: RADIOLOGY

## 2022-12-02 PROCEDURE — 76830 US PELVIS COMP WITH TRANSVAG NON-OB (XPD): ICD-10-PCS | Mod: 26,,, | Performed by: RADIOLOGY

## 2022-12-02 PROCEDURE — 76856 US EXAM PELVIC COMPLETE: CPT | Mod: 26,,, | Performed by: RADIOLOGY

## 2022-12-05 LAB
FINAL PATHOLOGIC DIAGNOSIS: NORMAL
Lab: NORMAL

## 2023-01-06 ENCOUNTER — OFFICE VISIT (OUTPATIENT)
Dept: OBSTETRICS AND GYNECOLOGY | Facility: CLINIC | Age: 48
End: 2023-01-06
Payer: COMMERCIAL

## 2023-01-06 VITALS
DIASTOLIC BLOOD PRESSURE: 80 MMHG | WEIGHT: 151.88 LBS | BODY MASS INDEX: 27.95 KG/M2 | SYSTOLIC BLOOD PRESSURE: 150 MMHG | HEIGHT: 62 IN

## 2023-01-06 DIAGNOSIS — D21.9 FIBROIDS: ICD-10-CM

## 2023-01-06 DIAGNOSIS — N83.201 RIGHT OVARIAN CYST: ICD-10-CM

## 2023-01-06 DIAGNOSIS — R10.30 LOWER ABDOMINAL PAIN: Primary | ICD-10-CM

## 2023-01-06 PROCEDURE — 3079F PR MOST RECENT DIASTOLIC BLOOD PRESSURE 80-89 MM HG: ICD-10-PCS | Mod: CPTII,S$GLB,, | Performed by: OBSTETRICS & GYNECOLOGY

## 2023-01-06 PROCEDURE — 1160F RVW MEDS BY RX/DR IN RCRD: CPT | Mod: CPTII,S$GLB,, | Performed by: OBSTETRICS & GYNECOLOGY

## 2023-01-06 PROCEDURE — 99999 PR PBB SHADOW E&M-EST. PATIENT-LVL III: CPT | Mod: PBBFAC,,, | Performed by: OBSTETRICS & GYNECOLOGY

## 2023-01-06 PROCEDURE — 1159F MED LIST DOCD IN RCRD: CPT | Mod: CPTII,S$GLB,, | Performed by: OBSTETRICS & GYNECOLOGY

## 2023-01-06 PROCEDURE — 3077F SYST BP >= 140 MM HG: CPT | Mod: CPTII,S$GLB,, | Performed by: OBSTETRICS & GYNECOLOGY

## 2023-01-06 PROCEDURE — 3008F BODY MASS INDEX DOCD: CPT | Mod: CPTII,S$GLB,, | Performed by: OBSTETRICS & GYNECOLOGY

## 2023-01-06 PROCEDURE — 1160F PR REVIEW ALL MEDS BY PRESCRIBER/CLIN PHARMACIST DOCUMENTED: ICD-10-PCS | Mod: CPTII,S$GLB,, | Performed by: OBSTETRICS & GYNECOLOGY

## 2023-01-06 PROCEDURE — 3079F DIAST BP 80-89 MM HG: CPT | Mod: CPTII,S$GLB,, | Performed by: OBSTETRICS & GYNECOLOGY

## 2023-01-06 PROCEDURE — 3077F PR MOST RECENT SYSTOLIC BLOOD PRESSURE >= 140 MM HG: ICD-10-PCS | Mod: CPTII,S$GLB,, | Performed by: OBSTETRICS & GYNECOLOGY

## 2023-01-06 PROCEDURE — 99999 PR PBB SHADOW E&M-EST. PATIENT-LVL III: ICD-10-PCS | Mod: PBBFAC,,, | Performed by: OBSTETRICS & GYNECOLOGY

## 2023-01-06 PROCEDURE — 99214 OFFICE O/P EST MOD 30 MIN: CPT | Mod: S$GLB,,, | Performed by: OBSTETRICS & GYNECOLOGY

## 2023-01-06 PROCEDURE — 3008F PR BODY MASS INDEX (BMI) DOCUMENTED: ICD-10-PCS | Mod: CPTII,S$GLB,, | Performed by: OBSTETRICS & GYNECOLOGY

## 2023-01-06 PROCEDURE — 99214 PR OFFICE/OUTPT VISIT, EST, LEVL IV, 30-39 MIN: ICD-10-PCS | Mod: S$GLB,,, | Performed by: OBSTETRICS & GYNECOLOGY

## 2023-01-06 PROCEDURE — 1159F PR MEDICATION LIST DOCUMENTED IN MEDICAL RECORD: ICD-10-PCS | Mod: CPTII,S$GLB,, | Performed by: OBSTETRICS & GYNECOLOGY

## 2023-01-06 NOTE — PROGRESS NOTES
Subjective:       Patient ID: Serina Barriga is a 47 y.o. female.    Chief Complaint:  Abdominal Pain      History of Present Illness  Abdominal Pain  Patient presents for evaluation of abdominal pain. The pain is described as cramping and sharp, and is 4/10 in intensity currently, but has reached 10/10 on several occasions. Pain is located in the RLQ area without radiation. Onset was sudden occurring 2 months ago. Symptoms have been gradually worsening since. Aggravating factors: movement and pressure. Alleviating factors: none. Associated symptoms: constipation. The patient denies anorexia, diarrhea, dysuria, fever, hematochezia, hematuria, nausea, and vomiting. Risk factors for pelvic/abdominal pain include prior ovarian cyst rupture and uterine fibroids.  Pt reports normally having 1 BM per day but has noted that her pattern changed to 1 BM every 3 days since the pains started.  BM have been very hard and painful to pass.  Pt normally sees Dr. Sanon but has not been able to get an appointment with her.  This has resulted in pt being seen by NP on 11/29/22.  US at that visit showed uterine fibroids and a 3.8 cm right ovarian simple cyst.  Pt also had an evaluation with her PCP who ordered CT scan.  CT on 12/20 showed a 2.5 cm right ovarian.  Pt also saw Dr. Javier at [facility] on 12/27 for same.  Dr. Javier switched her OCP to a continuous monophasic pill.  Reports increased back pains since.  She was recommended to have an EMB, however, pt is confused by this recommendations.  Menses are regular with periods lasting 5 days.  Bleeding has been heavier over the past several months.  Pt has grown frustrated with lack of progress and would like another opinion.  Pt also feels that her uterus has grown significantly since her last ultrasound and fears that she may have formed a new cyst on her left ovary.  Last pap NILM in 2022.  Is sexually active and reports no issues with intercourse since pains started.    was present for interview and stepped out at pt request for examination.  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      GYN & OB History  Patient's last menstrual period was 2022 (exact date).   Date of Last Pap: 2022    OB History    Para Term  AB Living   2 2 2     2   SAB IAB Ectopic Multiple Live Births           2      # Outcome Date GA Lbr Doug/2nd Weight Sex Delivery Anes PTL Lv   2 Term      Vag-Spont   YUE   1 Term      Vag-Spont   YUE       Review of Systems  Review of Systems   Constitutional:  Negative for activity change, appetite change, chills, fatigue, fever and unexpected weight change.   Respiratory:  Negative for shortness of breath.    Cardiovascular:  Negative for chest pain, palpitations and leg swelling.   Gastrointestinal:  Positive for abdominal pain and constipation. Negative for bloating, blood in stool, diarrhea, nausea and vomiting.   Genitourinary:  Positive for flank pain, menorrhagia and pelvic pain. Negative for dysmenorrhea, dyspareunia, dysuria, frequency, genital sores, hematuria, menstrual problem, urgency, vaginal bleeding, vaginal discharge, vaginal pain, urinary incontinence, postcoital bleeding, vaginal dryness and vaginal odor.   Musculoskeletal:  Positive for back pain.   Integumentary:  Negative for rash.   Neurological:  Negative for syncope and headaches.         Objective:    Physical Exam:   Constitutional: She is oriented to person, place, and time. She appears well-developed and well-nourished. No distress.       Cardiovascular:  Normal rate and regular rhythm.             Pulmonary/Chest: Effort normal and breath sounds normal.        Abdominal: Soft. Bowel sounds are normal. She exhibits no distension and no mass. There is abdominal tenderness in the right lower quadrant and left lower quadrant. There is no rebound, no guarding, no tenderness at McBurney's point and negative Mayfield's sign. No hernia.     Genitourinary:     Vagina normal.      Pelvic exam was performed with patient supine.   There is no rash, tenderness, lesion or injury on the right labia. There is no rash, tenderness, lesion or injury on the left labia. Cervix is normal. Right adnexum displays tenderness. Right adnexum displays no mass and no fullness. Left adnexum displays tenderness. Left adnexum displays no mass and no fullness. No erythema,  no vaginal discharge, tenderness or bleeding in the vagina.    No foreign body in the vagina.      No signs of injury in the vagina.   Cervix exhibits no motion tenderness, no discharge and no friability. Uterus is enlarged. Uterus is not deviated and not tender. Uterus size: 8 cm.          Musculoskeletal: Normal range of motion and moves all extremeties. No tenderness or edema.       Neurological: She is alert and oriented to person, place, and time.    Skin: Skin is warm and dry.    Psychiatric: She has a normal mood and affect. Her behavior is normal. Thought content normal.       US Pelvis Comp with Transvag NON-OB (xpd  Narrative: EXAMINATION:  US PELVIS COMP WITH TRANSVAG NON-OB (XPD)    CLINICAL HISTORY:  Right lower quadrant pain    TECHNIQUE:  Transabdominal sonography of the pelvis was performed, followed by transvaginal sonography to better evaluate the uterus and ovaries.    COMPARISON:  None.    FINDINGS:  Uterus:    Size: 10.0 x 5.7 x 6.1 cm    Masses: Multiple uterine fibroids present, largest within the anterior fundus measuring 3.9 cm.    Endometrium: Normal in this pre menopausal patient, measuring 3.8 mm.    Right ovary:    Size: 5 x 3 x 3.5 cm    Appearance: 3.8 cm simple cyst.    Vascular flow: Normal.    Left ovary:    Size: 3 x 2 x 2 cm    Appearance: Normal    Vascular Flow: Normal.    Free Fluid:    None.  Impression: Leiomyomatous uterus.  3.8 cm simple right ovarian cyst.    Electronically signed by: Nico Phillips MD  Date:    12/02/2022  Time:    13:59      CT Abd/pelvis At [facility]  12/20/22:    FINDINGS:     Visualized portions of the inferior thorax are unremarkable.     Abdomen: The liver, gallbladder, pancreas, spleen, adrenal glands, and kidneys are unremarkable for noncontrast technique. No radiopaque urinary tract calculus is identified. No abdominal adenopathy.     Pelvis: Unopacified bowel loops demonstrate no CT evidence of high-grade bowel obstruction. The appendix is normal. The bladder contains a tiny amount of urine and is grossly unremarkable. Lobular configuration of the uterus indicates the presence of fibroids. A 2.5 cm right ovarian hypodensity likely represents a cyst. No pelvic or inguinal adenopathy. No aggressive osseous lesion.        Assessment:        1. Lower abdominal pain    2. Fibroids    3. Right ovarian cyst              Plan:      Lower abdominal pain  -     Clinical presentation is non-specific and exam today was overall unremarkable from Gyn perspective.  Pattern of pain and report of worsening constipation since pain started raises suspicion of GI origin to pain (most likely related to constipation).  Pt advised to increase hydration, fiber supplementation, use of OTC stool softeners, and use laxatives over the weekend.  Monitor progress of symptoms and recommend follow up with PCP for this issue.    Fibroids  -     US Pelvis Comp with Transvag NON-OB (xpd; Future; Expected date: 01/06/2023  -     Pt was counseled on ultrasound findings.  Pt was counseled on fibroids, including common signs and symptoms.  Although fibroids can cause pain and bleeding symptoms, it is difficult to accurately determine if symptoms of pain are caused by the fibroids.  Pt is done with her fertility and has no desire for future childbearing.  Treatment options were reviewed with pt, including expectant vs medical vs fibroid embolization vs myomectomy vs hysterectomy.  Pt has been on OCP for over 20 yrs consecutively and was recently switched to monophasic continuous OCP.  Recommend  that pt continue with this regimen for now.  Pt voiced understanding and desires to continue with current OCP.  -     Although reports of increased flow, overall pattern of menses remains in the normal range and her most recent H/H in Epic was normal.  Thus, EMB is not indicated at this time.  Pt reassured.    Right ovarian cyst  -     US Pelvis Comp with Transvag NON-OB (xpd; Future; Expected date: 01/06/2023  -     Pt was counseled on ovarian cysts.  Including that they are a common and often times asymptomatic benign findings that normally resolve spontaneously and without intervention.  Common symptoms associated with cysts and treatment/prevention options were discussed.  Right ovarian cyst noted on CT and Ultrasounds is simple and has appeared to diminish is size.  It is likely to resolve spontaneously.  A cyst of this size and characteristics is unlikely to cause severe pain symptoms that pt is reporting.  Thus, advise repeat ultrasound for interval comparison.  If cyst resolved, would advise no further interventions.  Pt will monitor symptoms in mean time.      Follow up in about 4 weeks (around 2/3/2023).    I spent a total of 45 minutes on the day of the visit.This includes face to face time and on-face to face time preparing to see the patient (eg, review of tests), Obtaining and/or reviewing separately obtained history, Documenting clinical information in the electronic or other health record, Independently interpreting resultsand communicating results to the patient/family/caregiver, or Care coordination.

## 2023-01-10 ENCOUNTER — HOSPITAL ENCOUNTER (OUTPATIENT)
Dept: RADIOLOGY | Facility: HOSPITAL | Age: 48
Discharge: HOME | End: 2023-01-10
Attending: OBSTETRICS & GYNECOLOGY
Payer: COMMERCIAL

## 2023-01-10 DIAGNOSIS — D21.9 FIBROIDS: ICD-10-CM

## 2023-01-10 DIAGNOSIS — N83.201 RIGHT OVARIAN CYST: ICD-10-CM

## 2023-01-10 PROCEDURE — 76856 US EXAM PELVIC COMPLETE: CPT | Mod: TC

## 2023-01-10 PROCEDURE — 76856 US EXAM PELVIC COMPLETE: CPT | Mod: 26,,, | Performed by: RADIOLOGY

## 2023-01-10 PROCEDURE — 76856 US PELVIS COMP WITH TRANSVAG NON-OB (XPD): ICD-10-PCS | Mod: 26,,, | Performed by: RADIOLOGY

## 2023-01-10 PROCEDURE — 76830 US PELVIS COMP WITH TRANSVAG NON-OB (XPD): ICD-10-PCS | Mod: 26,,, | Performed by: RADIOLOGY

## 2023-01-10 PROCEDURE — 76830 TRANSVAGINAL US NON-OB: CPT | Mod: 26,,, | Performed by: RADIOLOGY

## 2023-02-07 ENCOUNTER — OFFICE VISIT (OUTPATIENT)
Dept: OBSTETRICS AND GYNECOLOGY | Facility: CLINIC | Age: 48
End: 2023-02-07
Payer: COMMERCIAL

## 2023-02-07 VITALS
BODY MASS INDEX: 28.28 KG/M2 | SYSTOLIC BLOOD PRESSURE: 126 MMHG | DIASTOLIC BLOOD PRESSURE: 74 MMHG | HEIGHT: 62 IN | WEIGHT: 153.69 LBS

## 2023-02-07 DIAGNOSIS — D21.9 FIBROIDS: Primary | ICD-10-CM

## 2023-02-07 DIAGNOSIS — Z12.31 BREAST CANCER SCREENING BY MAMMOGRAM: ICD-10-CM

## 2023-02-07 PROCEDURE — 1160F RVW MEDS BY RX/DR IN RCRD: CPT | Mod: CPTII,S$GLB,, | Performed by: OBSTETRICS & GYNECOLOGY

## 2023-02-07 PROCEDURE — 99999 PR PBB SHADOW E&M-EST. PATIENT-LVL III: CPT | Mod: PBBFAC,,, | Performed by: OBSTETRICS & GYNECOLOGY

## 2023-02-07 PROCEDURE — 4010F PR ACE/ARB THEARPY RXD/TAKEN: ICD-10-PCS | Mod: CPTII,S$GLB,, | Performed by: OBSTETRICS & GYNECOLOGY

## 2023-02-07 PROCEDURE — 3078F DIAST BP <80 MM HG: CPT | Mod: CPTII,S$GLB,, | Performed by: OBSTETRICS & GYNECOLOGY

## 2023-02-07 PROCEDURE — 3078F PR MOST RECENT DIASTOLIC BLOOD PRESSURE < 80 MM HG: ICD-10-PCS | Mod: CPTII,S$GLB,, | Performed by: OBSTETRICS & GYNECOLOGY

## 2023-02-07 PROCEDURE — 99212 OFFICE O/P EST SF 10 MIN: CPT | Mod: S$GLB,,, | Performed by: OBSTETRICS & GYNECOLOGY

## 2023-02-07 PROCEDURE — 4010F ACE/ARB THERAPY RXD/TAKEN: CPT | Mod: CPTII,S$GLB,, | Performed by: OBSTETRICS & GYNECOLOGY

## 2023-02-07 PROCEDURE — 99212 PR OFFICE/OUTPT VISIT, EST, LEVL II, 10-19 MIN: ICD-10-PCS | Mod: S$GLB,,, | Performed by: OBSTETRICS & GYNECOLOGY

## 2023-02-07 PROCEDURE — 99999 PR PBB SHADOW E&M-EST. PATIENT-LVL III: ICD-10-PCS | Mod: PBBFAC,,, | Performed by: OBSTETRICS & GYNECOLOGY

## 2023-02-07 PROCEDURE — 3008F BODY MASS INDEX DOCD: CPT | Mod: CPTII,S$GLB,, | Performed by: OBSTETRICS & GYNECOLOGY

## 2023-02-07 PROCEDURE — 1159F PR MEDICATION LIST DOCUMENTED IN MEDICAL RECORD: ICD-10-PCS | Mod: CPTII,S$GLB,, | Performed by: OBSTETRICS & GYNECOLOGY

## 2023-02-07 PROCEDURE — 1159F MED LIST DOCD IN RCRD: CPT | Mod: CPTII,S$GLB,, | Performed by: OBSTETRICS & GYNECOLOGY

## 2023-02-07 PROCEDURE — 3074F SYST BP LT 130 MM HG: CPT | Mod: CPTII,S$GLB,, | Performed by: OBSTETRICS & GYNECOLOGY

## 2023-02-07 PROCEDURE — 3008F PR BODY MASS INDEX (BMI) DOCUMENTED: ICD-10-PCS | Mod: CPTII,S$GLB,, | Performed by: OBSTETRICS & GYNECOLOGY

## 2023-02-07 PROCEDURE — 1160F PR REVIEW ALL MEDS BY PRESCRIBER/CLIN PHARMACIST DOCUMENTED: ICD-10-PCS | Mod: CPTII,S$GLB,, | Performed by: OBSTETRICS & GYNECOLOGY

## 2023-02-07 PROCEDURE — 3074F PR MOST RECENT SYSTOLIC BLOOD PRESSURE < 130 MM HG: ICD-10-PCS | Mod: CPTII,S$GLB,, | Performed by: OBSTETRICS & GYNECOLOGY

## 2023-02-07 NOTE — PROGRESS NOTES
Subjective:       Patient ID: Serina Barriga is a 47 y.o. female.    Chief Complaint:  Follow-up      History of Present Illness  Pt is here for follow up.  Reports that pains have completely resolved.  No issues today.    GYN & OB History  Patient's last menstrual period was 2022 (exact date).   Date of Last Pap: 2022    OB History    Para Term  AB Living   2 2 2     2   SAB IAB Ectopic Multiple Live Births           2      # Outcome Date GA Lbr Doug/2nd Weight Sex Delivery Anes PTL Lv   2 Term      Vag-Spont   YUE   1 Term      Vag-Spont   YUE       Review of Systems  Review of Systems   Constitutional:  Negative for activity change, appetite change, chills, fatigue, fever and unexpected weight change.   Respiratory:  Negative for shortness of breath.    Cardiovascular:  Negative for chest pain, palpitations and leg swelling.   Gastrointestinal:  Negative for abdominal pain, bloating, blood in stool, constipation, diarrhea, nausea and vomiting.   Genitourinary:  Negative for dysmenorrhea, dyspareunia, dysuria, flank pain, frequency, genital sores, hematuria, menorrhagia, menstrual problem, pelvic pain, urgency, vaginal bleeding, vaginal discharge, vaginal pain, urinary incontinence, postcoital bleeding, vaginal dryness and vaginal odor.   Musculoskeletal:  Positive for back pain.   Neurological:  Negative for syncope and headaches.         Objective:    Physical Exam:   Constitutional: She is oriented to person, place, and time. She appears well-developed and well-nourished. No distress.                           Neurological: She is alert and oriented to person, place, and time.     Psychiatric: She has a normal mood and affect. Her behavior is normal. Thought content normal.       US Pelvis Comp with Transvag NON-OB (xpd  Narrative: EXAMINATION:  US PELVIS COMP WITH TRANSVAG NON-OB (XPD)    CLINICAL HISTORY:  Benign neoplasm of connective and other soft tissue,  unspecified    TECHNIQUE:  Transabdominal sonography of the pelvis was performed, followed by transvaginal sonography to better evaluate the uterus and ovaries.    COMPARISON:  12/02/2022    FINDINGS:  The uterus measures 9.6 cm in length. The uterus is retroverted.  There is a 9 mm intramural fibroid within the posterior fundal region.  A 2.2 cm fibroid is noted within the anterior body of the uterus.  A 3.4 cm fibroid is seen in the region of the lower uterine segment anteriorly.  The endometrial stripe in this premenopausal patient measures 7.6 mm which is within normal limits.  The right ovary measures 2.7 x 1.7 x 1.5 cm.  The left ovary measures 3.0 x 1.7 x 2.3 cm.    Vascular flow demonstrated to both ovaries.  No free fluid identified.  Impression: 1. Retroverted fibroid uterus.  2. Previously noted right ovarian cyst has resolved.    Electronically signed by: Pierce Torres DO  Date:    01/10/2023  Time:    15:31         Assessment:        1. Fibroids    2. Breast cancer screening by mammogram              Plan:      Fibroids  -     US results reviewed with pt.  Cyst has completely resolved and pt reports pain symptoms have also resolved.  No further interventions at this time.  F/u with annual.    Breast cancer screening by mammogram  -     Mammo Digital Screening Bilat w/ Lee; Future; Expected date: 02/07/2023      Follow up in about 10 months (around 12/7/2023) for Annual exam.

## 2023-02-21 ENCOUNTER — PATIENT MESSAGE (OUTPATIENT)
Dept: OBSTETRICS AND GYNECOLOGY | Facility: CLINIC | Age: 48
End: 2023-02-21
Payer: COMMERCIAL

## 2023-02-21 NOTE — TELEPHONE ENCOUNTER
Spoke to patient and informed her that her pharmacy will need to get an over ride on the medication from her insurance.  Patient voiced understanding and stated that the pharmacy is already in contact with her insurance and will wait to hear back from them.

## 2023-03-13 ENCOUNTER — HOSPITAL ENCOUNTER (OUTPATIENT)
Dept: RADIOLOGY | Facility: HOSPITAL | Age: 48
Discharge: HOME | End: 2023-03-13
Attending: OBSTETRICS & GYNECOLOGY
Payer: COMMERCIAL

## 2023-03-13 VITALS — HEIGHT: 62 IN | WEIGHT: 153.69 LBS | BODY MASS INDEX: 28.28 KG/M2

## 2023-03-13 DIAGNOSIS — Z12.31 BREAST CANCER SCREENING BY MAMMOGRAM: ICD-10-CM

## 2023-03-13 PROCEDURE — 77063 BREAST TOMOSYNTHESIS BI: CPT | Mod: 26,,, | Performed by: RADIOLOGY

## 2023-03-13 PROCEDURE — 77067 SCR MAMMO BI INCL CAD: CPT | Mod: 26,,, | Performed by: RADIOLOGY

## 2023-03-13 PROCEDURE — 77067 SCR MAMMO BI INCL CAD: CPT | Mod: TC

## 2023-03-13 PROCEDURE — 77063 MAMMO DIGITAL SCREENING BILAT WITH TOMO: ICD-10-PCS | Mod: 26,,, | Performed by: RADIOLOGY

## 2023-03-13 PROCEDURE — 77067 MAMMO DIGITAL SCREENING BILAT WITH TOMO: ICD-10-PCS | Mod: 26,,, | Performed by: RADIOLOGY

## 2023-09-15 ENCOUNTER — PATIENT MESSAGE (OUTPATIENT)
Dept: OBSTETRICS AND GYNECOLOGY | Facility: CLINIC | Age: 48
End: 2023-09-15
Payer: COMMERCIAL

## 2023-09-15 DIAGNOSIS — N83.209 CYST OF OVARY, UNSPECIFIED LATERALITY: ICD-10-CM

## 2023-09-20 RX ORDER — NORGESTIMATE AND ETHINYL ESTRADIOL 0.25-0.035
1 KIT ORAL DAILY
Qty: 90 TABLET | OUTPATIENT
Start: 2023-09-20 | End: 2024-09-19

## 2023-09-22 DIAGNOSIS — N83.209 CYST OF OVARY, UNSPECIFIED LATERALITY: ICD-10-CM

## 2023-09-22 RX ORDER — NORGESTIMATE AND ETHINYL ESTRADIOL 0.25-0.035
1 KIT ORAL DAILY
Qty: 90 TABLET | Refills: 0 | Status: SHIPPED | OUTPATIENT
Start: 2023-09-22 | End: 2023-11-24

## 2023-09-22 NOTE — TELEPHONE ENCOUNTER
----- Message from Francisco Gregorio sent at 9/22/2023  9:50 AM CDT -----  Contact: Vicki  Patient is calling regarding norgestimate-ethinyl estradioL (ORTHO-CYCLEN) 0.25-35 mg-mcg per tablet....Reports needing prescription called in to listed pharmacy as soon as possible. Patient stated she sent [employer] messages twice but never received a response, please call the patient/respond to [employer] if any questions,etc.                 [facility]/pharmacy #[id number] - [place] - [address]  [address] [address]  Phone: [phone] Fax: [phone]

## 2023-11-24 DIAGNOSIS — N83.209 CYST OF OVARY, UNSPECIFIED LATERALITY: ICD-10-CM

## 2023-11-24 RX ORDER — NORGESTIMATE AND ETHINYL ESTRADIOL 0.25-0.035
1 KIT ORAL
Qty: 84 TABLET | Refills: 0 | Status: SHIPPED | OUTPATIENT
Start: 2023-11-24 | End: 2024-01-30 | Stop reason: SDUPTHER

## 2023-11-24 NOTE — TELEPHONE ENCOUNTER
Refill Decision Note   Serina Barriga  is requesting a refill authorization.  Brief Assessment and Rationale for Refill:  Approve     Medication Therapy Plan:         Comments:     Note composed:12:49 PM 11/24/2023

## 2024-01-30 ENCOUNTER — OFFICE VISIT (OUTPATIENT)
Dept: OBSTETRICS AND GYNECOLOGY | Facility: CLINIC | Age: 49
End: 2024-01-30
Payer: COMMERCIAL

## 2024-01-30 VITALS
BODY MASS INDEX: 29.33 KG/M2 | SYSTOLIC BLOOD PRESSURE: 144 MMHG | WEIGHT: 159.38 LBS | DIASTOLIC BLOOD PRESSURE: 87 MMHG | HEIGHT: 62 IN

## 2024-01-30 DIAGNOSIS — D21.9 FIBROIDS: ICD-10-CM

## 2024-01-30 DIAGNOSIS — Z01.419 WELL WOMAN EXAM WITH ROUTINE GYNECOLOGICAL EXAM: Primary | ICD-10-CM

## 2024-01-30 DIAGNOSIS — Z12.31 BREAST CANCER SCREENING BY MAMMOGRAM: ICD-10-CM

## 2024-01-30 PROCEDURE — 99999 PR PBB SHADOW E&M-EST. PATIENT-LVL III: CPT | Mod: PBBFAC,,, | Performed by: OBSTETRICS & GYNECOLOGY

## 2024-01-30 PROCEDURE — 1160F RVW MEDS BY RX/DR IN RCRD: CPT | Mod: CPTII,S$GLB,, | Performed by: OBSTETRICS & GYNECOLOGY

## 2024-01-30 PROCEDURE — 3077F SYST BP >= 140 MM HG: CPT | Mod: CPTII,S$GLB,, | Performed by: OBSTETRICS & GYNECOLOGY

## 2024-01-30 PROCEDURE — 3008F BODY MASS INDEX DOCD: CPT | Mod: CPTII,S$GLB,, | Performed by: OBSTETRICS & GYNECOLOGY

## 2024-01-30 PROCEDURE — 1159F MED LIST DOCD IN RCRD: CPT | Mod: CPTII,S$GLB,, | Performed by: OBSTETRICS & GYNECOLOGY

## 2024-01-30 PROCEDURE — 3079F DIAST BP 80-89 MM HG: CPT | Mod: CPTII,S$GLB,, | Performed by: OBSTETRICS & GYNECOLOGY

## 2024-01-30 PROCEDURE — 99396 PREV VISIT EST AGE 40-64: CPT | Mod: S$GLB,,, | Performed by: OBSTETRICS & GYNECOLOGY

## 2024-01-30 RX ORDER — NORGESTIMATE AND ETHINYL ESTRADIOL 0.25-0.035
1 KIT ORAL DAILY
Qty: 84 TABLET | Refills: 3 | Status: SHIPPED | OUTPATIENT
Start: 2024-01-30

## 2024-01-30 NOTE — PROGRESS NOTES
Subjective:      Patient ID: Serina Barriga is a 48 y.o. female.    Chief Complaint:  Well Woman      History of Present Illness  HPI  Annual Exam-Premenopausal  Patient presents for annual exam. The patient has no complaints today. The patient is sexually active. GYN screening history: last pap: approximate date  and was normal and last mammogram: approximate date  and was normal. The patient wears seatbelts: yes. The patient participates in regular exercise: yes. Has the patient ever been transfused or tattooed?: yes. The patient reports that there is not domestic violence in her life.  Periods are irregular/sporadic on OCP.  Happy with use and needs refill.  Pelvic pains have resolved.  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    GYN & OB History  No LMP recorded. (Menstrual status: Birth Control).   Date of Last Pap: 2022    OB History    Para Term  AB Living   2 2 2     2   SAB IAB Ectopic Multiple Live Births           2      # Outcome Date GA Lbr Doug/2nd Weight Sex Delivery Anes PTL Lv   2 Term      Vag-Spont   YUE   1 Term      Vag-Spont   YUE       Review of Systems  Review of Systems   Constitutional:  Negative for activity change, appetite change, chills, fatigue, fever and unexpected weight change.   Respiratory:  Negative for shortness of breath.    Cardiovascular:  Negative for chest pain, palpitations and leg swelling.   Gastrointestinal:  Negative for abdominal pain, bloating, blood in stool, constipation, diarrhea, nausea and vomiting.   Genitourinary:  Negative for dysmenorrhea, dyspareunia, dysuria, flank pain, frequency, genital sores, hematuria, menorrhagia, menstrual problem, pelvic pain, urgency, vaginal bleeding, vaginal discharge, vaginal pain, urinary incontinence, postcoital bleeding, vaginal dryness and vaginal odor.   Musculoskeletal:  Negative for back pain.   Integumentary:  Negative for breast mass, nipple discharge, breast skin changes and breast  tenderness.   Neurological:  Negative for syncope and headaches.   Breast: Negative for asymmetry, lump, mass, mastodynia, nipple discharge, skin changes and tenderness         Objective:     Physical Exam:   Constitutional: She is oriented to person, place, and time. She appears well-developed and well-nourished. No distress.    HENT:   Head: Normocephalic and atraumatic.    Eyes: Pupils are equal, round, and reactive to light. EOM are normal.     Cardiovascular:  Normal rate, regular rhythm and normal heart sounds.             Pulmonary/Chest: Effort normal and breath sounds normal.        Abdominal: Soft. Bowel sounds are normal. She exhibits no distension. There is no abdominal tenderness.     Genitourinary:    Vagina, right adnexa and left adnexa normal.      Pelvic exam was performed with patient supine.   There is no rash, tenderness, lesion or injury on the right labia. There is no rash, tenderness, lesion or injury on the left labia. Cervix is normal. Right adnexum displays no mass, no tenderness and no fullness. Left adnexum displays no mass, no tenderness and no fullness. No erythema, vaginal discharge, tenderness or bleeding in the vagina.    No foreign body in the vagina.      No signs of injury in the vagina.   Cervix exhibits no motion tenderness, no discharge and no friability. Uterus is enlarged and hosting fibroids. Uterus is not deviated and not tender. Uterus size: 8 cm.          Musculoskeletal: Normal range of motion and moves all extremeties. No tenderness or edema.       Neurological: She is alert and oriented to person, place, and time.    Skin: Skin is warm and dry.    Psychiatric: She has a normal mood and affect. Her behavior is normal. Thought content normal.         Assessment:     1. Well woman exam with routine gynecological exam    2. Breast cancer screening by mammogram    3. Fibroids             Plan:     Well woman exam with routine gynecological exam  -     Pt was counseled on  cervical/vaginal screening guidelines and recommendations.  Last pap NILM on 2022.  As per current ASCCP guidelines, next pap is due 2025.  -     Follow up with PCP for routine health maintenance needs.    Breast cancer screening by mammogram  -     Mammo Digital Screening Bilat w/ Lee; Future; Expected date: 01/30/2024  -     Pt was advised on current breast cancer screening recommendations.  Pt desires to proceed with screening MMG.    Fibroids  -     norgestimate-ethinyl estradioL (ESTARYLLA) 0.25-35 mg-mcg per tablet; Take 1 tablet by mouth once daily.  Dispense: 84 tablet; Refill: 3  -     Pt was counseled on treatment options, including associated risks and benefits of each.  Pt voiced understanding and desires to continue with OCP.  Medication dosing, side-effects, risks, benefits, and alternatives were discussed.  Medical history was reviewed and pt is a candidate for OCP use.      Follow up in about 1 year (around 1/30/2025).

## 2024-02-08 ENCOUNTER — PATIENT MESSAGE (OUTPATIENT)
Dept: OBSTETRICS AND GYNECOLOGY | Facility: CLINIC | Age: 49
End: 2024-02-08
Payer: COMMERCIAL

## 2024-05-17 ENCOUNTER — HOSPITAL ENCOUNTER (OUTPATIENT)
Dept: RADIOLOGY | Facility: HOSPITAL | Age: 49
Discharge: HOME | End: 2024-05-17
Attending: OBSTETRICS & GYNECOLOGY
Payer: COMMERCIAL

## 2024-05-17 VITALS — HEIGHT: 62 IN | WEIGHT: 159.38 LBS | BODY MASS INDEX: 29.33 KG/M2

## 2024-05-17 DIAGNOSIS — Z12.31 BREAST CANCER SCREENING BY MAMMOGRAM: ICD-10-CM

## 2024-05-17 PROCEDURE — 77063 BREAST TOMOSYNTHESIS BI: CPT | Mod: 26,,, | Performed by: RADIOLOGY

## 2024-05-17 PROCEDURE — 77067 SCR MAMMO BI INCL CAD: CPT | Mod: 26,,, | Performed by: RADIOLOGY

## 2024-05-17 PROCEDURE — 77067 SCR MAMMO BI INCL CAD: CPT | Mod: TC

## 2024-11-12 DIAGNOSIS — D21.9 FIBROIDS: ICD-10-CM

## 2024-11-12 RX ORDER — NORGESTIMATE AND ETHINYL ESTRADIOL 0.25-0.035
1 KIT ORAL
Qty: 84 TABLET | Refills: 0 | Status: SHIPPED | OUTPATIENT
Start: 2024-11-12 | End: 2024-11-13 | Stop reason: SDUPTHER

## 2024-11-12 NOTE — TELEPHONE ENCOUNTER
Refill Routing Note   Medication(s) are not appropriate for processing by [facility] for the following reason(s):        Drug-disease interaction    [facility] action(s):  Defer        Medication Therapy Plan: FOV in 2 months;  BP completed ((!) 144/87) Drug-Disease: ESTARYLLA and Primary hypertension    Pharmacist review requested: Yes     Appointments  past 12m or future 3m with PCP    Date Provider   Last Visit   1/30/2024 Scottie Borja MD   Next Visit   2/3/2025 Scottie Borja MD   ED visits in past 90 days: 0        Note composed:7:42 AM 11/12/2024

## 2024-11-12 NOTE — TELEPHONE ENCOUNTER
[facility]ill Decision Note   [facility].  Brief Assessment and Rationale for Refill:  Approve     Medication Therapy Plan: FOV in 2 months      Pharmacist review requested: Yes   Comments:     Note composed:3:46 PM 11/12/2024

## 2024-11-13 DIAGNOSIS — D21.9 FIBROIDS: ICD-10-CM

## 2024-11-14 ENCOUNTER — PATIENT MESSAGE (OUTPATIENT)
Dept: OBSTETRICS AND GYNECOLOGY | Facility: CLINIC | Age: 49
End: 2024-11-14
Payer: COMMERCIAL

## 2024-11-14 RX ORDER — NORGESTIMATE AND ETHINYL ESTRADIOL 0.25-0.035
1 KIT ORAL DAILY
Qty: 84 TABLET | Refills: 0 | Status: SHIPPED | OUTPATIENT
Start: 2024-11-14

## 2024-11-15 ENCOUNTER — TELEPHONE (OUTPATIENT)
Dept: OBSTETRICS AND GYNECOLOGY | Facility: CLINIC | Age: 49
End: 2024-11-15
Payer: COMMERCIAL

## 2024-11-15 NOTE — TELEPHONE ENCOUNTER
----- Message from Mariza sent at 11/15/2024  9:50 AM CST -----  Regarding: Medical Advice  Type:  Needs Medical Advice    Who Called: Serina   Symptoms (please be specific):    How long has patient had these symptoms:      Pharmacy name and phone #:    [facility]pharm[facility] #[id number] - [address]  [place] [address]  Phone: [phone] Fax: [phone]      Would the patient rather a call back or a response via [facility]? call  Best Call Back Number:  [phone]  Additional Information: Serina is having trouble getting her medication refilled. She sent the request t the pharmacy on Tuesday and it was not filled. Via [employer] the pharmacy notified me that they sent a message and it was not processed one reason is due to insurance. She says she skips weeks on her birthcontrol and it makes it run out quicker, and this is per the provider instructions. They will not fill until January and she need it by Sunday.    RX:  norgestimate-ethinyl estradioL (ESTARYLLA) 0.25-35 mg-mcg per tablet

## 2024-11-15 NOTE — TELEPHONE ENCOUNTER
Pt called in regards to stating that she is having trouble filling her rx. Because she takes her OCP differently. Pharmacy does not fill it. She says she skips weeks on her birthcontrol and it makes it run out quicker, They will not fill until January and she need it by Sunday.   Message sent to  Dr. Jonh SCHWAB LPN  OB/GYN

## 2025-01-01 ENCOUNTER — HOSPITAL ENCOUNTER (INPATIENT)
Facility: HOSPITAL | Age: 50
LOS: 4 days | Discharge: OTHER INSTITUTION | DRG: 597 | End: 2025-03-07
Attending: HOSPITALIST | Admitting: INTERNAL MEDICINE
Payer: COMMERCIAL

## 2025-01-01 ENCOUNTER — HOSPITAL ENCOUNTER (INPATIENT)
Facility: HOSPITAL | Age: 50
LOS: 1 days | DRG: 871 | End: 2025-03-10
Attending: EMERGENCY MEDICINE | Admitting: SPECIALIST
Payer: COMMERCIAL

## 2025-01-01 ENCOUNTER — PATIENT MESSAGE (OUTPATIENT)
Dept: RADIOLOGY | Facility: HOSPITAL | Age: 50
End: 2025-01-01
Payer: COMMERCIAL

## 2025-01-01 ENCOUNTER — NURSE TRIAGE (OUTPATIENT)
Dept: ADMINISTRATIVE | Facility: CLINIC | Age: 50
End: 2025-01-01
Payer: COMMERCIAL

## 2025-01-01 ENCOUNTER — RESULTS FOLLOW-UP (OUTPATIENT)
Dept: RADIOLOGY | Facility: HOSPITAL | Age: 50
End: 2025-01-01

## 2025-01-01 VITALS
SYSTOLIC BLOOD PRESSURE: 97 MMHG | HEIGHT: 62 IN | BODY MASS INDEX: 29.7 KG/M2 | WEIGHT: 161.38 LBS | TEMPERATURE: 100 F | RESPIRATION RATE: 16 BRPM | DIASTOLIC BLOOD PRESSURE: 53 MMHG | OXYGEN SATURATION: 95 % | HEART RATE: 79 BPM

## 2025-01-01 VITALS
HEIGHT: 62 IN | WEIGHT: 149.94 LBS | TEMPERATURE: 99 F | OXYGEN SATURATION: 83 % | HEART RATE: 67 BPM | SYSTOLIC BLOOD PRESSURE: 58 MMHG | BODY MASS INDEX: 27.59 KG/M2 | RESPIRATION RATE: 20 BRPM | DIASTOLIC BLOOD PRESSURE: 25 MMHG

## 2025-01-01 DIAGNOSIS — R07.9 CHEST PAIN: ICD-10-CM

## 2025-01-01 DIAGNOSIS — C50.812 MALIGNANT NEOPLASM OF OVERLAPPING SITES OF LEFT BREAST IN FEMALE, ESTROGEN RECEPTOR NEGATIVE: ICD-10-CM

## 2025-01-01 DIAGNOSIS — N17.0 SEPSIS WITH ACUTE RENAL FAILURE, TUBULAR NECROSIS, AND SEPTIC SHOCK, DUE TO UNSPECIFIED ORGANISM: ICD-10-CM

## 2025-01-01 DIAGNOSIS — R21 RASH: ICD-10-CM

## 2025-01-01 DIAGNOSIS — K12.31 MUCOSITIS DUE TO CHEMOTHERAPY: ICD-10-CM

## 2025-01-01 DIAGNOSIS — R65.21 SEPSIS WITH ACUTE RENAL FAILURE, TUBULAR NECROSIS, AND SEPTIC SHOCK, DUE TO UNSPECIFIED ORGANISM: ICD-10-CM

## 2025-01-01 DIAGNOSIS — A41.9 SEPSIS WITH ACUTE RENAL FAILURE, TUBULAR NECROSIS, AND SEPTIC SHOCK, DUE TO UNSPECIFIED ORGANISM: ICD-10-CM

## 2025-01-01 DIAGNOSIS — Z17.1 MALIGNANT NEOPLASM OF OVERLAPPING SITES OF LEFT BREAST IN FEMALE, ESTROGEN RECEPTOR NEGATIVE: ICD-10-CM

## 2025-01-01 DIAGNOSIS — R04.0 EPISTAXIS: ICD-10-CM

## 2025-01-01 DIAGNOSIS — K72.00 ACUTE LIVER FAILURE WITHOUT HEPATIC COMA: Primary | ICD-10-CM

## 2025-01-01 DIAGNOSIS — K72.00 ACUTE LIVER FAILURE: ICD-10-CM

## 2025-01-01 DIAGNOSIS — Z13.6 SCREENING FOR CARDIOVASCULAR CONDITION: ICD-10-CM

## 2025-01-01 DIAGNOSIS — N17.9 ACUTE RENAL FAILURE, UNSPECIFIED ACUTE RENAL FAILURE TYPE: ICD-10-CM

## 2025-01-01 DIAGNOSIS — D70.9 NEUTROPENIA: ICD-10-CM

## 2025-01-01 DIAGNOSIS — A41.9 SEPSIS, DUE TO UNSPECIFIED ORGANISM, UNSPECIFIED WHETHER ACUTE ORGAN DYSFUNCTION PRESENT: Primary | ICD-10-CM

## 2025-01-01 LAB
ABO + RH BLD: NORMAL
ACANTHOCYTES BLD QL SMEAR: PRESENT
ACANTHOCYTES BLD QL SMEAR: PRESENT
ALBUMIN SERPL BCP-MCNC: 1.5 G/DL (ref 3.5–5.2)
ALBUMIN SERPL BCP-MCNC: 1.6 G/DL (ref 3.5–5.2)
ALBUMIN SERPL BCP-MCNC: 1.7 G/DL (ref 3.5–5.2)
ALBUMIN SERPL BCP-MCNC: 2 G/DL (ref 3.5–5.2)
ALLENS TEST: ABNORMAL
ALLENS TEST: ABNORMAL
ALP SERPL-CCNC: 552 U/L (ref 40–150)
ALP SERPL-CCNC: 575 U/L (ref 40–150)
ALP SERPL-CCNC: 600 U/L (ref 40–150)
ALP SERPL-CCNC: 603 U/L (ref 40–150)
ALP SERPL-CCNC: 606 U/L (ref 40–150)
ALP SERPL-CCNC: 607 U/L (ref 40–150)
ALP SERPL-CCNC: 679 U/L (ref 40–150)
ALP SERPL-CCNC: 841 U/L (ref 40–150)
ALT SERPL W/O P-5'-P-CCNC: 109 U/L (ref 10–44)
ALT SERPL W/O P-5'-P-CCNC: 117 U/L (ref 10–44)
ALT SERPL W/O P-5'-P-CCNC: 120 U/L (ref 10–44)
ALT SERPL W/O P-5'-P-CCNC: 122 U/L (ref 10–44)
ALT SERPL W/O P-5'-P-CCNC: 125 U/L (ref 10–44)
ALT SERPL W/O P-5'-P-CCNC: 136 U/L (ref 10–44)
ALT SERPL W/O P-5'-P-CCNC: 143 U/L (ref 10–44)
ALT SERPL W/O P-5'-P-CCNC: 157 U/L (ref 10–44)
AMMONIA PLAS-SCNC: 42 UMOL/L (ref 10–50)
AMMONIA PLAS-SCNC: 66 UMOL/L (ref 10–50)
AMMONIA PLAS-SCNC: 67 UMOL/L (ref 10–50)
AMMONIA PLAS-SCNC: 85 UMOL/L (ref 10–50)
ANION GAP SERPL CALC-SCNC: 10 MMOL/L (ref 8–16)
ANION GAP SERPL CALC-SCNC: 13 MMOL/L (ref 8–16)
ANION GAP SERPL CALC-SCNC: 14 MMOL/L (ref 8–16)
ANION GAP SERPL CALC-SCNC: 16 MMOL/L (ref 8–16)
ANION GAP SERPL CALC-SCNC: 18 MMOL/L (ref 8–16)
ANION GAP SERPL CALC-SCNC: 7 MMOL/L (ref 8–16)
ANION GAP SERPL CALC-SCNC: 8 MMOL/L (ref 8–16)
ANION GAP SERPL CALC-SCNC: 9 MMOL/L (ref 8–16)
ANION GAP SERPL CALC-SCNC: 9 MMOL/L (ref 8–16)
ANISOCYTOSIS BLD QL SMEAR: ABNORMAL
ANISOCYTOSIS BLD QL SMEAR: SLIGHT
APTT PPP: 148.1 SEC (ref 21–32)
APTT PPP: 79.7 SEC (ref 21–32)
AST SERPL-CCNC: 415 U/L (ref 10–40)
AST SERPL-CCNC: 430 U/L (ref 10–40)
AST SERPL-CCNC: 481 U/L (ref 10–40)
AST SERPL-CCNC: 515 U/L (ref 10–40)
AST SERPL-CCNC: 579 U/L (ref 10–40)
AST SERPL-CCNC: 590 U/L (ref 10–40)
AST SERPL-CCNC: 628 U/L (ref 10–40)
AST SERPL-CCNC: 716 U/L (ref 10–40)
BACTERIA #/AREA URNS HPF: ABNORMAL /HPF
BACTERIA #/AREA URNS HPF: NORMAL /HPF
BACTERIA BLD CULT: NORMAL
BACTERIA BLD CULT: NORMAL
BASOPHILS # BLD AUTO: 0.01 K/UL (ref 0–0.2)
BASOPHILS # BLD AUTO: 0.01 K/UL (ref 0–0.2)
BASOPHILS # BLD AUTO: ABNORMAL K/UL (ref 0–0.2)
BASOPHILS NFR BLD: 0 % (ref 0–1.9)
BASOPHILS NFR BLD: 0.6 % (ref 0–1.9)
BASOPHILS NFR BLD: 1 % (ref 0–1.9)
BASOPHILS NFR BLD: 1 % (ref 0–1.9)
BASOPHILS NFR BLD: 3 % (ref 0–1.9)
BILIRUB DIRECT SERPL-MCNC: >14 MG/DL (ref 0.1–0.3)
BILIRUB SERPL-MCNC: 22 MG/DL (ref 0.1–1)
BILIRUB SERPL-MCNC: 22.7 MG/DL (ref 0.1–1)
BILIRUB SERPL-MCNC: 22.9 MG/DL (ref 0.1–1)
BILIRUB SERPL-MCNC: 23.3 MG/DL (ref 0.1–1)
BILIRUB SERPL-MCNC: 23.5 MG/DL (ref 0.1–1)
BILIRUB SERPL-MCNC: 24.7 MG/DL (ref 0.1–1)
BILIRUB SERPL-MCNC: 25.3 MG/DL (ref 0.1–1)
BILIRUB SERPL-MCNC: 25.4 MG/DL (ref 0.1–1)
BILIRUB UR QL STRIP: ABNORMAL
BILIRUB UR QL STRIP: ABNORMAL
BLD GP AB SCN CELLS X3 SERPL QL: NORMAL
BLD PROD TYP BPU: NORMAL
BLOOD UNIT EXPIRATION DATE: NORMAL
BLOOD UNIT TYPE CODE: 1700
BLOOD UNIT TYPE CODE: 7300
BLOOD UNIT TYPE: NORMAL
BNP SERPL-MCNC: 66 PG/ML (ref 0–99)
BUN SERPL-MCNC: 102 MG/DL (ref 6–20)
BUN SERPL-MCNC: 103 MG/DL (ref 6–20)
BUN SERPL-MCNC: 104 MG/DL (ref 6–20)
BUN SERPL-MCNC: 104 MG/DL (ref 6–20)
BUN SERPL-MCNC: 105 MG/DL (ref 6–20)
BUN SERPL-MCNC: 105 MG/DL (ref 6–20)
BUN SERPL-MCNC: 31 MG/DL (ref 6–20)
BUN SERPL-MCNC: 31 MG/DL (ref 6–20)
BUN SERPL-MCNC: 39 MG/DL (ref 6–20)
BUN SERPL-MCNC: 46 MG/DL (ref 6–20)
BUN SERPL-MCNC: 47 MG/DL (ref 6–20)
BURR CELLS BLD QL SMEAR: ABNORMAL
BURR CELLS BLD QL SMEAR: ABNORMAL
C DIFF GDH STL QL: NEGATIVE
C DIFF TOX A+B STL QL IA: NEGATIVE
CALCIUM SERPL-MCNC: 5.7 MG/DL (ref 8.7–10.5)
CALCIUM SERPL-MCNC: 5.8 MG/DL (ref 8.7–10.5)
CALCIUM SERPL-MCNC: 5.9 MG/DL (ref 8.7–10.5)
CALCIUM SERPL-MCNC: 6 MG/DL (ref 8.7–10.5)
CALCIUM SERPL-MCNC: 6.5 MG/DL (ref 8.7–10.5)
CALCIUM SERPL-MCNC: 6.7 MG/DL (ref 8.7–10.5)
CALCIUM SERPL-MCNC: 6.8 MG/DL (ref 8.7–10.5)
CALCIUM SERPL-MCNC: 7.1 MG/DL (ref 8.7–10.5)
CALCIUM SERPL-MCNC: 7.2 MG/DL (ref 8.7–10.5)
CALCIUM SERPL-MCNC: 7.4 MG/DL (ref 8.7–10.5)
CALCIUM SERPL-MCNC: 7.4 MG/DL (ref 8.7–10.5)
CHLORIDE SERPL-SCNC: 103 MMOL/L (ref 95–110)
CHLORIDE SERPL-SCNC: 106 MMOL/L (ref 95–110)
CHLORIDE SERPL-SCNC: 106 MMOL/L (ref 95–110)
CHLORIDE SERPL-SCNC: 107 MMOL/L (ref 95–110)
CHLORIDE SERPL-SCNC: 109 MMOL/L (ref 95–110)
CHLORIDE SERPL-SCNC: 110 MMOL/L (ref 95–110)
CHLORIDE SERPL-SCNC: 111 MMOL/L (ref 95–110)
CHLORIDE SERPL-SCNC: 116 MMOL/L (ref 95–110)
CHLORIDE SERPL-SCNC: 117 MMOL/L (ref 95–110)
CHLORIDE SERPL-SCNC: 118 MMOL/L (ref 95–110)
CHLORIDE SERPL-SCNC: 119 MMOL/L (ref 95–110)
CLARITY UR: ABNORMAL
CLARITY UR: ABNORMAL
CO2 SERPL-SCNC: 11 MMOL/L (ref 23–29)
CO2 SERPL-SCNC: 12 MMOL/L (ref 23–29)
CO2 SERPL-SCNC: 15 MMOL/L (ref 23–29)
CO2 SERPL-SCNC: 15 MMOL/L (ref 23–29)
CO2 SERPL-SCNC: 16 MMOL/L (ref 23–29)
CO2 SERPL-SCNC: 17 MMOL/L (ref 23–29)
CO2 SERPL-SCNC: 9 MMOL/L (ref 23–29)
CODING SYSTEM: NORMAL
COLOR UR: YELLOW
COLOR UR: YELLOW
CREAT SERPL-MCNC: 1.3 MG/DL (ref 0.5–1.4)
CREAT SERPL-MCNC: 1.6 MG/DL (ref 0.5–1.4)
CREAT SERPL-MCNC: 1.9 MG/DL (ref 0.5–1.4)
CREAT SERPL-MCNC: 5.2 MG/DL (ref 0.5–1.4)
CREAT SERPL-MCNC: 5.2 MG/DL (ref 0.5–1.4)
CREAT SERPL-MCNC: 5.3 MG/DL (ref 0.5–1.4)
CREAT SERPL-MCNC: 5.3 MG/DL (ref 0.5–1.4)
CREAT SERPL-MCNC: 5.4 MG/DL (ref 0.5–1.4)
CREAT SERPL-MCNC: 5.4 MG/DL (ref 0.5–1.4)
CROSSMATCH INTERPRETATION: NORMAL
DACRYOCYTES BLD QL SMEAR: ABNORMAL
DELSYS: ABNORMAL
DELSYS: ABNORMAL
DIFFERENTIAL METHOD BLD: ABNORMAL
DISPENSE STATUS: NORMAL
EOSINOPHIL # BLD AUTO: 0 K/UL (ref 0–0.5)
EOSINOPHIL # BLD AUTO: 0 K/UL (ref 0–0.5)
EOSINOPHIL # BLD AUTO: ABNORMAL K/UL (ref 0–0.5)
EOSINOPHIL NFR BLD: 0 % (ref 0–8)
EOSINOPHIL NFR BLD: 0.6 % (ref 0–8)
EOSINOPHIL NFR BLD: 1 % (ref 0–8)
ERYTHROCYTE [DISTWIDTH] IN BLOOD BY AUTOMATED COUNT: 22.2 % (ref 11.5–14.5)
ERYTHROCYTE [DISTWIDTH] IN BLOOD BY AUTOMATED COUNT: 22.3 % (ref 11.5–14.5)
ERYTHROCYTE [DISTWIDTH] IN BLOOD BY AUTOMATED COUNT: 22.3 % (ref 11.5–14.5)
ERYTHROCYTE [DISTWIDTH] IN BLOOD BY AUTOMATED COUNT: 22.5 % (ref 11.5–14.5)
ERYTHROCYTE [DISTWIDTH] IN BLOOD BY AUTOMATED COUNT: 22.6 % (ref 11.5–14.5)
ERYTHROCYTE [DISTWIDTH] IN BLOOD BY AUTOMATED COUNT: 23.2 % (ref 11.5–14.5)
ERYTHROCYTE [DISTWIDTH] IN BLOOD BY AUTOMATED COUNT: 23.4 % (ref 11.5–14.5)
ERYTHROCYTE [DISTWIDTH] IN BLOOD BY AUTOMATED COUNT: 23.7 % (ref 11.5–14.5)
ERYTHROCYTE [DISTWIDTH] IN BLOOD BY AUTOMATED COUNT: 24.4 % (ref 11.5–14.5)
ERYTHROCYTE [DISTWIDTH] IN BLOOD BY AUTOMATED COUNT: 24.5 % (ref 11.5–14.5)
ERYTHROCYTE [SEDIMENTATION RATE] IN BLOOD BY WESTERGREN METHOD: 20 MM/H
ERYTHROCYTE [SEDIMENTATION RATE] IN BLOOD BY WESTERGREN METHOD: 20 MM/H
EST. GFR  (NO RACE VARIABLE): 10 ML/MIN/1.73 M^2
EST. GFR  (NO RACE VARIABLE): 10 ML/MIN/1.73 M^2
EST. GFR  (NO RACE VARIABLE): 32 ML/MIN/1.73 M^2
EST. GFR  (NO RACE VARIABLE): 39 ML/MIN/1.73 M^2
EST. GFR  (NO RACE VARIABLE): 50 ML/MIN/1.73 M^2
EST. GFR  (NO RACE VARIABLE): 9 ML/MIN/1.73 M^2
FIBRINOGEN PPP-MCNC: 266 MG/DL (ref 182–400)
FIO2: 100
FIO2: 50
GLUCOSE SERPL-MCNC: 100 MG/DL (ref 70–110)
GLUCOSE SERPL-MCNC: 109 MG/DL (ref 70–110)
GLUCOSE SERPL-MCNC: 167 MG/DL (ref 70–110)
GLUCOSE SERPL-MCNC: 170 MG/DL (ref 70–110)
GLUCOSE SERPL-MCNC: 173 MG/DL (ref 70–110)
GLUCOSE SERPL-MCNC: 194 MG/DL (ref 70–110)
GLUCOSE SERPL-MCNC: 229 MG/DL (ref 70–110)
GLUCOSE SERPL-MCNC: 75 MG/DL (ref 70–110)
GLUCOSE SERPL-MCNC: 80 MG/DL (ref 70–110)
GLUCOSE SERPL-MCNC: 84 MG/DL (ref 70–110)
GLUCOSE SERPL-MCNC: 87 MG/DL (ref 70–110)
GLUCOSE UR QL STRIP: ABNORMAL
GLUCOSE UR QL STRIP: NEGATIVE
HCO3 UR-SCNC: 13.6 MMOL/L (ref 24–28)
HCO3 UR-SCNC: 14.1 MMOL/L (ref 24–28)
HCT VFR BLD AUTO: 19.4 % (ref 37–48.5)
HCT VFR BLD AUTO: 21.2 % (ref 37–48.5)
HCT VFR BLD AUTO: 21.4 % (ref 37–48.5)
HCT VFR BLD AUTO: 25.8 % (ref 37–48.5)
HCT VFR BLD AUTO: 30.7 % (ref 37–48.5)
HCT VFR BLD AUTO: 32.3 % (ref 37–48.5)
HCT VFR BLD AUTO: 33 % (ref 37–48.5)
HCT VFR BLD AUTO: 33.3 % (ref 37–48.5)
HCT VFR BLD AUTO: 33.4 % (ref 37–48.5)
HCT VFR BLD AUTO: 36.4 % (ref 37–48.5)
HGB BLD-MCNC: 10.6 G/DL (ref 12–16)
HGB BLD-MCNC: 11.3 G/DL (ref 12–16)
HGB BLD-MCNC: 11.6 G/DL (ref 12–16)
HGB BLD-MCNC: 11.7 G/DL (ref 12–16)
HGB BLD-MCNC: 11.8 G/DL (ref 12–16)
HGB BLD-MCNC: 13.2 G/DL (ref 12–16)
HGB BLD-MCNC: 7.2 G/DL (ref 12–16)
HGB BLD-MCNC: 7.4 G/DL (ref 12–16)
HGB BLD-MCNC: 7.6 G/DL (ref 12–16)
HGB BLD-MCNC: 9.1 G/DL (ref 12–16)
HGB UR QL STRIP: ABNORMAL
HGB UR QL STRIP: ABNORMAL
HYALINE CASTS #/AREA URNS LPF: 0 /LPF
HYALINE CASTS #/AREA URNS LPF: 2 /LPF
HYPOCHROMIA BLD QL SMEAR: ABNORMAL
IMM GRANULOCYTES # BLD AUTO: 0 K/UL (ref 0–0.04)
IMM GRANULOCYTES # BLD AUTO: 0.02 K/UL (ref 0–0.04)
IMM GRANULOCYTES # BLD AUTO: ABNORMAL K/UL (ref 0–0.04)
IMM GRANULOCYTES NFR BLD AUTO: 0 % (ref 0–0.5)
IMM GRANULOCYTES NFR BLD AUTO: 1.3 % (ref 0–0.5)
IMM GRANULOCYTES NFR BLD AUTO: ABNORMAL % (ref 0–0.5)
INR PPP: 1.9 (ref 0.8–1.2)
INR PPP: 2.2 (ref 0.8–1.2)
INR PPP: 2.3 (ref 0.8–1.2)
INR PPP: 4.6 (ref 0.8–1.2)
INR PPP: >10 (ref 0.8–1.2)
KETONES UR QL STRIP: NEGATIVE
KETONES UR QL STRIP: NEGATIVE
LACTATE SERPL-SCNC: 0.6 MMOL/L (ref 0.5–2.2)
LACTATE SERPL-SCNC: 0.9 MMOL/L (ref 0.5–2.2)
LACTATE SERPL-SCNC: 1.3 MMOL/L (ref 0.5–2.2)
LEUKOCYTE ESTERASE UR QL STRIP: NEGATIVE
LEUKOCYTE ESTERASE UR QL STRIP: NEGATIVE
LIPASE SERPL-CCNC: 15 U/L (ref 4–60)
LIPASE SERPL-CCNC: 253 U/L (ref 4–60)
LYMPHOCYTES # BLD AUTO: 0.6 K/UL (ref 1–4.8)
LYMPHOCYTES # BLD AUTO: 0.8 K/UL (ref 1–4.8)
LYMPHOCYTES # BLD AUTO: ABNORMAL K/UL (ref 1–4.8)
LYMPHOCYTES NFR BLD: 12 % (ref 18–48)
LYMPHOCYTES NFR BLD: 18 % (ref 18–48)
LYMPHOCYTES NFR BLD: 26 % (ref 18–48)
LYMPHOCYTES NFR BLD: 38 % (ref 18–48)
LYMPHOCYTES NFR BLD: 41 % (ref 18–48)
LYMPHOCYTES NFR BLD: 51.9 % (ref 18–48)
LYMPHOCYTES NFR BLD: 61 % (ref 18–48)
LYMPHOCYTES NFR BLD: 7 % (ref 18–48)
LYMPHOCYTES NFR BLD: 82 % (ref 18–48)
LYMPHOCYTES NFR BLD: 9 % (ref 18–48)
MAGNESIUM SERPL-MCNC: 2.4 MG/DL (ref 1.6–2.6)
MAGNESIUM SERPL-MCNC: 2.5 MG/DL (ref 1.6–2.6)
MAGNESIUM SERPL-MCNC: 2.5 MG/DL (ref 1.6–2.6)
MAGNESIUM SERPL-MCNC: 2.8 MG/DL (ref 1.6–2.6)
MCH RBC QN AUTO: 26.1 PG (ref 27–31)
MCH RBC QN AUTO: 26.2 PG (ref 27–31)
MCH RBC QN AUTO: 26.4 PG (ref 27–31)
MCH RBC QN AUTO: 26.5 PG (ref 27–31)
MCH RBC QN AUTO: 26.6 PG (ref 27–31)
MCH RBC QN AUTO: 27.1 PG (ref 27–31)
MCH RBC QN AUTO: 27.4 PG (ref 27–31)
MCH RBC QN AUTO: 27.8 PG (ref 27–31)
MCH RBC QN AUTO: 27.9 PG (ref 27–31)
MCH RBC QN AUTO: 28.9 PG (ref 27–31)
MCHC RBC AUTO-ENTMCNC: 34.5 G/DL (ref 32–36)
MCHC RBC AUTO-ENTMCNC: 34.6 G/DL (ref 32–36)
MCHC RBC AUTO-ENTMCNC: 34.7 G/DL (ref 32–36)
MCHC RBC AUTO-ENTMCNC: 35 G/DL (ref 32–36)
MCHC RBC AUTO-ENTMCNC: 35.3 G/DL (ref 32–36)
MCHC RBC AUTO-ENTMCNC: 35.4 G/DL (ref 32–36)
MCHC RBC AUTO-ENTMCNC: 35.5 G/DL (ref 32–36)
MCHC RBC AUTO-ENTMCNC: 35.8 G/DL (ref 32–36)
MCHC RBC AUTO-ENTMCNC: 36.3 G/DL (ref 32–36)
MCHC RBC AUTO-ENTMCNC: 37.1 G/DL (ref 32–36)
MCV RBC AUTO: 75 FL (ref 82–98)
MCV RBC AUTO: 75 FL (ref 82–98)
MCV RBC AUTO: 76 FL (ref 82–98)
MCV RBC AUTO: 78 FL (ref 82–98)
MCV RBC AUTO: 78 FL (ref 82–98)
MCV RBC AUTO: 79 FL (ref 82–98)
METAMYELOCYTES NFR BLD MANUAL: 1 %
METAMYELOCYTES NFR BLD MANUAL: 3 %
METAMYELOCYTES NFR BLD MANUAL: 5 %
MICROSCOPIC COMMENT: ABNORMAL
MICROSCOPIC COMMENT: NORMAL
MIN VOL: 10
MIN VOL: 11
MODE: ABNORMAL
MODE: ABNORMAL
MONOCYTES # BLD AUTO: 0.4 K/UL (ref 0.3–1)
MONOCYTES # BLD AUTO: 0.7 K/UL (ref 0.3–1)
MONOCYTES # BLD AUTO: ABNORMAL K/UL (ref 0.3–1)
MONOCYTES NFR BLD: 0 % (ref 4–15)
MONOCYTES NFR BLD: 10 % (ref 4–15)
MONOCYTES NFR BLD: 2 % (ref 4–15)
MONOCYTES NFR BLD: 22 % (ref 4–15)
MONOCYTES NFR BLD: 34.3 % (ref 4–15)
MONOCYTES NFR BLD: 44.4 % (ref 4–15)
MONOCYTES NFR BLD: 47 % (ref 4–15)
MONOCYTES NFR BLD: 5 % (ref 4–15)
MONOCYTES NFR BLD: 5 % (ref 4–15)
MONOCYTES NFR BLD: 9 % (ref 4–15)
MYELOCYTES NFR BLD MANUAL: 11 %
MYELOCYTES NFR BLD MANUAL: 2 %
MYELOCYTES NFR BLD MANUAL: 3 %
MYELOCYTES NFR BLD MANUAL: 6 %
MYELOCYTES NFR BLD MANUAL: 8 %
NEUTROPHILS # BLD AUTO: 0 K/UL (ref 1.8–7.7)
NEUTROPHILS # BLD AUTO: 0 K/UL (ref 1.8–7.7)
NEUTROPHILS # BLD AUTO: ABNORMAL K/UL (ref 1.8–7.7)
NEUTROPHILS NFR BLD: 1.2 % (ref 38–73)
NEUTROPHILS NFR BLD: 22 % (ref 38–73)
NEUTROPHILS NFR BLD: 3.7 % (ref 38–73)
NEUTROPHILS NFR BLD: 5 % (ref 38–73)
NEUTROPHILS NFR BLD: 63 % (ref 38–73)
NEUTROPHILS NFR BLD: 64 % (ref 38–73)
NEUTROPHILS NFR BLD: 65 % (ref 38–73)
NEUTROPHILS NFR BLD: 70 % (ref 38–73)
NEUTROPHILS NFR BLD: 8 % (ref 38–73)
NEUTROPHILS NFR BLD: 92 % (ref 38–73)
NEUTS BAND NFR BLD MANUAL: 4 %
NEUTS BAND NFR BLD MANUAL: 4 %
NEUTS BAND NFR BLD MANUAL: 5 %
NEUTS BAND NFR BLD MANUAL: 5 %
NEUTS BAND NFR BLD MANUAL: 6 %
NITRITE UR QL STRIP: NEGATIVE
NITRITE UR QL STRIP: POSITIVE
NRBC BLD-RTO: 0 /100 WBC
NRBC BLD-RTO: 0 /100 WBC
NRBC BLD-RTO: 1 /100 WBC
NRBC BLD-RTO: 10 /100 WBC
NRBC BLD-RTO: 3 /100 WBC
NRBC BLD-RTO: 4 /100 WBC
NRBC BLD-RTO: 4 /100 WBC
NRBC BLD-RTO: 5 /100 WBC
NRBC BLD-RTO: 5 /100 WBC
NRBC BLD-RTO: 6 /100 WBC
NUM UNITS TRANS FFP: NORMAL
OHS QRS DURATION: 90 MS
OHS QTC CALCULATION: 476 MS
OVALOCYTES BLD QL SMEAR: ABNORMAL
PATH REV BLD -IMP: NORMAL
PATH REV BLD -IMP: NORMAL
PCO2 BLDA: 26.8 MMHG (ref 35–45)
PCO2 BLDA: 26.9 MMHG (ref 35–45)
PEEP: 5
PEEP: 5
PH SMN: 7.31 [PH] (ref 7.35–7.45)
PH SMN: 7.33 [PH] (ref 7.35–7.45)
PH UR STRIP: 6 [PH] (ref 5–8)
PH UR STRIP: >8 [PH] (ref 5–8)
PHOSPHATE SERPL-MCNC: 3.4 MG/DL (ref 2.7–4.5)
PHOSPHATE SERPL-MCNC: 3.6 MG/DL (ref 2.7–4.5)
PHOSPHATE SERPL-MCNC: 4.2 MG/DL (ref 2.7–4.5)
PHOSPHATE SERPL-MCNC: 7.3 MG/DL (ref 2.7–4.5)
PHOSPHATE SERPL-MCNC: 7.4 MG/DL (ref 2.7–4.5)
PIP: 2
PIP: 28
PLATELET # BLD AUTO: 125 K/UL (ref 150–450)
PLATELET # BLD AUTO: 133 K/UL (ref 150–450)
PLATELET # BLD AUTO: 148 K/UL (ref 150–450)
PLATELET # BLD AUTO: 149 K/UL (ref 150–450)
PLATELET # BLD AUTO: 159 K/UL (ref 150–450)
PLATELET # BLD AUTO: 60 K/UL (ref 150–450)
PLATELET # BLD AUTO: 67 K/UL (ref 150–450)
PLATELET # BLD AUTO: 69 K/UL (ref 150–450)
PLATELET # BLD AUTO: 71 K/UL (ref 150–450)
PLATELET # BLD AUTO: 76 K/UL (ref 150–450)
PLATELET BLD QL SMEAR: ABNORMAL
PMV BLD AUTO: ABNORMAL FL (ref 9.2–12.9)
PO2 BLDA: 136 MMHG (ref 80–100)
PO2 BLDA: 302 MMHG (ref 80–100)
POC BE: -12 MMOL/L
POC BE: -13 MMOL/L
POC SATURATED O2: 100 % (ref 95–100)
POC SATURATED O2: 99 % (ref 95–100)
POIKILOCYTOSIS BLD QL SMEAR: ABNORMAL
POIKILOCYTOSIS BLD QL SMEAR: SLIGHT
POLYCHROMASIA BLD QL SMEAR: ABNORMAL
POTASSIUM SERPL-SCNC: 3.2 MMOL/L (ref 3.5–5.1)
POTASSIUM SERPL-SCNC: 3.3 MMOL/L (ref 3.5–5.1)
POTASSIUM SERPL-SCNC: 3.5 MMOL/L (ref 3.5–5.1)
POTASSIUM SERPL-SCNC: 3.5 MMOL/L (ref 3.5–5.1)
POTASSIUM SERPL-SCNC: 3.6 MMOL/L (ref 3.5–5.1)
POTASSIUM SERPL-SCNC: 3.8 MMOL/L (ref 3.5–5.1)
POTASSIUM SERPL-SCNC: 3.9 MMOL/L (ref 3.5–5.1)
POTASSIUM SERPL-SCNC: 3.9 MMOL/L (ref 3.5–5.1)
POTASSIUM SERPL-SCNC: 4.1 MMOL/L (ref 3.5–5.1)
POTASSIUM SERPL-SCNC: 4.4 MMOL/L (ref 3.5–5.1)
POTASSIUM SERPL-SCNC: 4.5 MMOL/L (ref 3.5–5.1)
PROCALCITONIN SERPL IA-MCNC: 1.64 NG/ML
PROCALCITONIN SERPL IA-MCNC: 4.84 NG/ML
PROT SERPL-MCNC: 4 G/DL (ref 6–8.4)
PROT SERPL-MCNC: 4.3 G/DL (ref 6–8.4)
PROT SERPL-MCNC: 4.4 G/DL (ref 6–8.4)
PROT SERPL-MCNC: 4.6 G/DL (ref 6–8.4)
PROT SERPL-MCNC: 4.7 G/DL (ref 6–8.4)
PROT SERPL-MCNC: 4.7 G/DL (ref 6–8.4)
PROT SERPL-MCNC: 4.8 G/DL (ref 6–8.4)
PROT SERPL-MCNC: 5.3 G/DL (ref 6–8.4)
PROT UR QL STRIP: ABNORMAL
PROT UR QL STRIP: ABNORMAL
PROTHROMBIN TIME: 20.3 SEC (ref 9–12.5)
PROTHROMBIN TIME: 23.6 SEC (ref 9–12.5)
PROTHROMBIN TIME: 23.9 SEC (ref 9–12.5)
PROTHROMBIN TIME: 47.5 SEC (ref 9–12.5)
PROTHROMBIN TIME: >100 SEC (ref 9–12.5)
RBC # BLD AUTO: 2.49 M/UL (ref 4–5.4)
RBC # BLD AUTO: 2.72 M/UL (ref 4–5.4)
RBC # BLD AUTO: 2.8 M/UL (ref 4–5.4)
RBC # BLD AUTO: 3.27 M/UL (ref 4–5.4)
RBC # BLD AUTO: 4.06 M/UL (ref 4–5.4)
RBC # BLD AUTO: 4.26 M/UL (ref 4–5.4)
RBC # BLD AUTO: 4.32 M/UL (ref 4–5.4)
RBC # BLD AUTO: 4.43 M/UL (ref 4–5.4)
RBC # BLD AUTO: 4.44 M/UL (ref 4–5.4)
RBC # BLD AUTO: 4.81 M/UL (ref 4–5.4)
RBC #/AREA URNS HPF: 2 /HPF (ref 0–4)
RBC #/AREA URNS HPF: 5 /HPF (ref 0–4)
SAMPLE: ABNORMAL
SAMPLE: ABNORMAL
SCHISTOCYTES BLD QL SMEAR: PRESENT
SITE: ABNORMAL
SITE: ABNORMAL
SODIUM SERPL-SCNC: 132 MMOL/L (ref 136–145)
SODIUM SERPL-SCNC: 134 MMOL/L (ref 136–145)
SODIUM SERPL-SCNC: 135 MMOL/L (ref 136–145)
SODIUM SERPL-SCNC: 138 MMOL/L (ref 136–145)
SODIUM SERPL-SCNC: 140 MMOL/L (ref 136–145)
SODIUM SERPL-SCNC: 141 MMOL/L (ref 136–145)
SODIUM SERPL-SCNC: 142 MMOL/L (ref 136–145)
SODIUM SERPL-SCNC: 143 MMOL/L (ref 136–145)
SP GR UR STRIP: 1.01 (ref 1–1.03)
SP GR UR STRIP: 1.01 (ref 1–1.03)
SP02: 100
SP02: 100
SPECIMEN OUTDATE: NORMAL
STOMATOCYTES BLD QL SMEAR: PRESENT
STOMATOCYTES BLD QL SMEAR: PRESENT
TARGETS BLD QL SMEAR: ABNORMAL
TOXIC GRANULES BLD QL SMEAR: PRESENT
TRI-PHOS CRY URNS QL MICRO: ABNORMAL
TROPONIN I SERPL DL<=0.01 NG/ML-MCNC: 0.03 NG/ML (ref 0–0.03)
URN SPEC COLLECT METH UR: ABNORMAL
URN SPEC COLLECT METH UR: ABNORMAL
UROBILINOGEN UR STRIP-ACNC: NEGATIVE EU/DL
UROBILINOGEN UR STRIP-ACNC: NEGATIVE EU/DL
VT: 350
VT: 350
WBC # BLD AUTO: 0.74 K/UL (ref 3.9–12.7)
WBC # BLD AUTO: 1.05 K/UL (ref 3.9–12.7)
WBC # BLD AUTO: 1.6 K/UL (ref 3.9–12.7)
WBC # BLD AUTO: 17.46 K/UL (ref 3.9–12.7)
WBC # BLD AUTO: 2.68 K/UL (ref 3.9–12.7)
WBC # BLD AUTO: 21.12 K/UL (ref 3.9–12.7)
WBC # BLD AUTO: 29.45 K/UL (ref 3.9–12.7)
WBC # BLD AUTO: 35 K/UL (ref 3.9–12.7)
WBC # BLD AUTO: 40.39 K/UL (ref 3.9–12.7)
WBC # BLD AUTO: 6.3 K/UL (ref 3.9–12.7)
WBC #/AREA URNS HPF: 3 /HPF (ref 0–5)
WBC #/AREA URNS HPF: 7 /HPF (ref 0–5)

## 2025-01-01 PROCEDURE — 99285 EMERGENCY DEPT VISIT HI MDM: CPT | Mod: 25

## 2025-01-01 PROCEDURE — 63600175 PHARM REV CODE 636 W HCPCS: Performed by: SPECIALIST

## 2025-01-01 PROCEDURE — 63600175 PHARM REV CODE 636 W HCPCS: Performed by: FAMILY MEDICINE

## 2025-01-01 PROCEDURE — 25000003 PHARM REV CODE 250: Performed by: FAMILY MEDICINE

## 2025-01-01 PROCEDURE — 25000003 PHARM REV CODE 250: Performed by: EMERGENCY MEDICINE

## 2025-01-01 PROCEDURE — P9016 RBC LEUKOCYTES REDUCED: HCPCS | Performed by: NURSE PRACTITIONER

## 2025-01-01 PROCEDURE — 25000003 PHARM REV CODE 250: Performed by: HOSPITALIST

## 2025-01-01 PROCEDURE — 85027 COMPLETE CBC AUTOMATED: CPT | Performed by: EMERGENCY MEDICINE

## 2025-01-01 PROCEDURE — 63600175 PHARM REV CODE 636 W HCPCS: Performed by: EMERGENCY MEDICINE

## 2025-01-01 PROCEDURE — 82248 BILIRUBIN DIRECT: CPT | Performed by: NURSE PRACTITIONER

## 2025-01-01 PROCEDURE — 87081 CULTURE SCREEN ONLY: CPT | Performed by: INTERNAL MEDICINE

## 2025-01-01 PROCEDURE — 96374 THER/PROPH/DIAG INJ IV PUSH: CPT

## 2025-01-01 PROCEDURE — 80053 COMPREHEN METABOLIC PANEL: CPT | Performed by: NURSE PRACTITIONER

## 2025-01-01 PROCEDURE — 63600175 PHARM REV CODE 636 W HCPCS: Performed by: NURSE PRACTITIONER

## 2025-01-01 PROCEDURE — 25000003 PHARM REV CODE 250: Performed by: NURSE PRACTITIONER

## 2025-01-01 PROCEDURE — 85007 BL SMEAR W/DIFF WBC COUNT: CPT | Performed by: FAMILY MEDICINE

## 2025-01-01 PROCEDURE — 83605 ASSAY OF LACTIC ACID: CPT | Performed by: NURSE PRACTITIONER

## 2025-01-01 PROCEDURE — 81000 URINALYSIS NONAUTO W/SCOPE: CPT | Performed by: NURSE PRACTITIONER

## 2025-01-01 PROCEDURE — 21400001 HC TELEMETRY ROOM

## 2025-01-01 PROCEDURE — 96361 HYDRATE IV INFUSION ADD-ON: CPT

## 2025-01-01 PROCEDURE — 27100171 HC OXYGEN HIGH FLOW UP TO 24 HOURS

## 2025-01-01 PROCEDURE — 85610 PROTHROMBIN TIME: CPT | Mod: 91 | Performed by: NURSE PRACTITIONER

## 2025-01-01 PROCEDURE — 85730 THROMBOPLASTIN TIME PARTIAL: CPT | Performed by: EMERGENCY MEDICINE

## 2025-01-01 PROCEDURE — 85610 PROTHROMBIN TIME: CPT | Performed by: FAMILY MEDICINE

## 2025-01-01 PROCEDURE — C1751 CATH, INF, PER/CENT/MIDLINE: HCPCS

## 2025-01-01 PROCEDURE — 83735 ASSAY OF MAGNESIUM: CPT | Performed by: EMERGENCY MEDICINE

## 2025-01-01 PROCEDURE — 94761 N-INVAS EAR/PLS OXIMETRY MLT: CPT

## 2025-01-01 PROCEDURE — 82140 ASSAY OF AMMONIA: CPT | Performed by: INTERNAL MEDICINE

## 2025-01-01 PROCEDURE — 85384 FIBRINOGEN ACTIVITY: CPT | Performed by: NURSE PRACTITIONER

## 2025-01-01 PROCEDURE — 83605 ASSAY OF LACTIC ACID: CPT | Performed by: EMERGENCY MEDICINE

## 2025-01-01 PROCEDURE — 85027 COMPLETE CBC AUTOMATED: CPT | Performed by: FAMILY MEDICINE

## 2025-01-01 PROCEDURE — 85730 THROMBOPLASTIN TIME PARTIAL: CPT | Performed by: INTERNAL MEDICINE

## 2025-01-01 PROCEDURE — 36430 TRANSFUSION BLD/BLD COMPNT: CPT

## 2025-01-01 PROCEDURE — 87070 CULTURE OTHR SPECIMN AEROBIC: CPT | Performed by: SPECIALIST

## 2025-01-01 PROCEDURE — 93005 ELECTROCARDIOGRAM TRACING: CPT

## 2025-01-01 PROCEDURE — P9017 PLASMA 1 DONOR FRZ W/IN 8 HR: HCPCS | Performed by: SPECIALIST

## 2025-01-01 PROCEDURE — 99900025 HC BRONCHOSCOPY-ASST (STAT)

## 2025-01-01 PROCEDURE — 85610 PROTHROMBIN TIME: CPT | Performed by: EMERGENCY MEDICINE

## 2025-01-01 PROCEDURE — 85007 BL SMEAR W/DIFF WBC COUNT: CPT | Performed by: NURSE PRACTITIONER

## 2025-01-01 PROCEDURE — 99900035 HC TECH TIME PER 15 MIN (STAT)

## 2025-01-01 PROCEDURE — 30233N1 TRANSFUSION OF NONAUTOLOGOUS RED BLOOD CELLS INTO PERIPHERAL VEIN, PERCUTANEOUS APPROACH: ICD-10-PCS | Performed by: INTERNAL MEDICINE

## 2025-01-01 PROCEDURE — 25000003 PHARM REV CODE 250: Performed by: SPECIALIST

## 2025-01-01 PROCEDURE — 87040 BLOOD CULTURE FOR BACTERIA: CPT | Performed by: NURSE PRACTITIONER

## 2025-01-01 PROCEDURE — 99232 SBSQ HOSP IP/OBS MODERATE 35: CPT | Mod: ,,, | Performed by: INTERNAL MEDICINE

## 2025-01-01 PROCEDURE — 84100 ASSAY OF PHOSPHORUS: CPT | Performed by: NURSE PRACTITIONER

## 2025-01-01 PROCEDURE — 80053 COMPREHEN METABOLIC PANEL: CPT | Performed by: FAMILY MEDICINE

## 2025-01-01 PROCEDURE — 02HV33Z INSERTION OF INFUSION DEVICE INTO SUPERIOR VENA CAVA, PERCUTANEOUS APPROACH: ICD-10-PCS | Performed by: FAMILY MEDICINE

## 2025-01-01 PROCEDURE — P9612 CATHETERIZE FOR URINE SPEC: HCPCS

## 2025-01-01 PROCEDURE — 83735 ASSAY OF MAGNESIUM: CPT | Performed by: NURSE PRACTITIONER

## 2025-01-01 PROCEDURE — 94002 VENT MGMT INPAT INIT DAY: CPT

## 2025-01-01 PROCEDURE — 85007 BL SMEAR W/DIFF WBC COUNT: CPT | Mod: 91

## 2025-01-01 PROCEDURE — 11000001 HC ACUTE MED/SURG PRIVATE ROOM

## 2025-01-01 PROCEDURE — 82140 ASSAY OF AMMONIA: CPT | Performed by: NURSE PRACTITIONER

## 2025-01-01 PROCEDURE — 87324 CLOSTRIDIUM AG IA: CPT | Performed by: NURSE PRACTITIONER

## 2025-01-01 PROCEDURE — 36415 COLL VENOUS BLD VENIPUNCTURE: CPT | Performed by: NURSE PRACTITIONER

## 2025-01-01 PROCEDURE — 02HV33Z INSERTION OF INFUSION DEVICE INTO SUPERIOR VENA CAVA, PERCUTANEOUS APPROACH: ICD-10-PCS | Performed by: INTERNAL MEDICINE

## 2025-01-01 PROCEDURE — 25000003 PHARM REV CODE 250: Performed by: INTERNAL MEDICINE

## 2025-01-01 PROCEDURE — 85610 PROTHROMBIN TIME: CPT | Performed by: INTERNAL MEDICINE

## 2025-01-01 PROCEDURE — 63600175 PHARM REV CODE 636 W HCPCS

## 2025-01-01 PROCEDURE — 99233 SBSQ HOSP IP/OBS HIGH 50: CPT | Mod: ,,, | Performed by: INTERNAL MEDICINE

## 2025-01-01 PROCEDURE — 0BH17EZ INSERTION OF ENDOTRACHEAL AIRWAY INTO TRACHEA, VIA NATURAL OR ARTIFICIAL OPENING: ICD-10-PCS | Performed by: INTERNAL MEDICINE

## 2025-01-01 PROCEDURE — 99900026 HC AIRWAY MAINTENANCE (STAT)

## 2025-01-01 PROCEDURE — 03HY32Z INSERTION OF MONITORING DEVICE INTO UPPER ARTERY, PERCUTANEOUS APPROACH: ICD-10-PCS | Performed by: INTERNAL MEDICINE

## 2025-01-01 PROCEDURE — 80053 COMPREHEN METABOLIC PANEL: CPT | Mod: 91 | Performed by: NURSE PRACTITIONER

## 2025-01-01 PROCEDURE — 84145 PROCALCITONIN (PCT): CPT | Performed by: EMERGENCY MEDICINE

## 2025-01-01 PROCEDURE — 84484 ASSAY OF TROPONIN QUANT: CPT | Performed by: EMERGENCY MEDICINE

## 2025-01-01 PROCEDURE — 85027 COMPLETE CBC AUTOMATED: CPT | Mod: 91 | Performed by: NURSE PRACTITIONER

## 2025-01-01 PROCEDURE — 5A1945Z RESPIRATORY VENTILATION, 24-96 CONSECUTIVE HOURS: ICD-10-PCS | Performed by: INTERNAL MEDICINE

## 2025-01-01 PROCEDURE — 85060 BLOOD SMEAR INTERPRETATION: CPT | Mod: ,,, | Performed by: STUDENT IN AN ORGANIZED HEALTH CARE EDUCATION/TRAINING PROGRAM

## 2025-01-01 PROCEDURE — 85027 COMPLETE CBC AUTOMATED: CPT | Performed by: NURSE PRACTITIONER

## 2025-01-01 PROCEDURE — 27100108

## 2025-01-01 PROCEDURE — 85007 BL SMEAR W/DIFF WBC COUNT: CPT | Mod: 91 | Performed by: NURSE PRACTITIONER

## 2025-01-01 PROCEDURE — 99900017 HC EXTUBATION W/PARAMETERS (STAT)

## 2025-01-01 PROCEDURE — 0B9F8ZX DRAINAGE OF RIGHT LOWER LUNG LOBE, VIA NATURAL OR ARTIFICIAL OPENING ENDOSCOPIC, DIAGNOSTIC: ICD-10-PCS | Performed by: SPECIALIST

## 2025-01-01 PROCEDURE — 85007 BL SMEAR W/DIFF WBC COUNT: CPT | Mod: 91 | Performed by: INTERNAL MEDICINE

## 2025-01-01 PROCEDURE — 0B9D8ZX DRAINAGE OF RIGHT MIDDLE LUNG LOBE, VIA NATURAL OR ARTIFICIAL OPENING ENDOSCOPIC, DIAGNOSTIC: ICD-10-PCS | Performed by: SPECIALIST

## 2025-01-01 PROCEDURE — 86920 COMPATIBILITY TEST SPIN: CPT

## 2025-01-01 PROCEDURE — 83735 ASSAY OF MAGNESIUM: CPT | Mod: 91 | Performed by: NURSE PRACTITIONER

## 2025-01-01 PROCEDURE — 93010 ELECTROCARDIOGRAM REPORT: CPT | Mod: ,,, | Performed by: INTERNAL MEDICINE

## 2025-01-01 PROCEDURE — 85027 COMPLETE CBC AUTOMATED: CPT | Mod: 91

## 2025-01-01 PROCEDURE — 80048 BASIC METABOLIC PNL TOTAL CA: CPT | Mod: XB

## 2025-01-01 PROCEDURE — 20000000 HC ICU ROOM

## 2025-01-01 PROCEDURE — 94003 VENT MGMT INPAT SUBQ DAY: CPT

## 2025-01-01 PROCEDURE — 86920 COMPATIBILITY TEST SPIN: CPT | Performed by: NURSE PRACTITIONER

## 2025-01-01 PROCEDURE — 83880 ASSAY OF NATRIURETIC PEPTIDE: CPT | Performed by: EMERGENCY MEDICINE

## 2025-01-01 PROCEDURE — 94640 AIRWAY INHALATION TREATMENT: CPT

## 2025-01-01 PROCEDURE — 30233K1 TRANSFUSION OF NONAUTOLOGOUS FROZEN PLASMA INTO PERIPHERAL VEIN, PERCUTANEOUS APPROACH: ICD-10-PCS | Performed by: INTERNAL MEDICINE

## 2025-01-01 PROCEDURE — 99223 1ST HOSP IP/OBS HIGH 75: CPT | Mod: ,,, | Performed by: INTERNAL MEDICINE

## 2025-01-01 PROCEDURE — 83690 ASSAY OF LIPASE: CPT | Performed by: EMERGENCY MEDICINE

## 2025-01-01 PROCEDURE — 63600175 PHARM REV CODE 636 W HCPCS: Performed by: HOSPITALIST

## 2025-01-01 PROCEDURE — 87205 SMEAR GRAM STAIN: CPT | Performed by: SPECIALIST

## 2025-01-01 PROCEDURE — P9017 PLASMA 1 DONOR FRZ W/IN 8 HR: HCPCS | Performed by: NURSE PRACTITIONER

## 2025-01-01 PROCEDURE — 85027 COMPLETE CBC AUTOMATED: CPT | Mod: 91 | Performed by: INTERNAL MEDICINE

## 2025-01-01 PROCEDURE — 87040 BLOOD CULTURE FOR BACTERIA: CPT | Mod: 59 | Performed by: EMERGENCY MEDICINE

## 2025-01-01 PROCEDURE — 82140 ASSAY OF AMMONIA: CPT | Performed by: FAMILY MEDICINE

## 2025-01-01 PROCEDURE — 85025 COMPLETE CBC W/AUTO DIFF WBC: CPT | Performed by: NURSE PRACTITIONER

## 2025-01-01 PROCEDURE — 81000 URINALYSIS NONAUTO W/SCOPE: CPT | Performed by: EMERGENCY MEDICINE

## 2025-01-01 PROCEDURE — 82140 ASSAY OF AMMONIA: CPT | Performed by: EMERGENCY MEDICINE

## 2025-01-01 PROCEDURE — 36573 INSJ PICC RS&I 5 YR+: CPT

## 2025-01-01 PROCEDURE — 84100 ASSAY OF PHOSPHORUS: CPT | Performed by: EMERGENCY MEDICINE

## 2025-01-01 PROCEDURE — 86850 RBC ANTIBODY SCREEN: CPT | Performed by: EMERGENCY MEDICINE

## 2025-01-01 PROCEDURE — 85007 BL SMEAR W/DIFF WBC COUNT: CPT | Performed by: EMERGENCY MEDICINE

## 2025-01-01 PROCEDURE — 83690 ASSAY OF LIPASE: CPT | Performed by: NURSE PRACTITIONER

## 2025-01-01 PROCEDURE — 84145 PROCALCITONIN (PCT): CPT | Performed by: NURSE PRACTITIONER

## 2025-01-01 PROCEDURE — 36620 INSERTION CATHETER ARTERY: CPT

## 2025-01-01 PROCEDURE — 31622 DX BRONCHOSCOPE/WASH: CPT

## 2025-01-01 PROCEDURE — 80053 COMPREHEN METABOLIC PANEL: CPT | Performed by: EMERGENCY MEDICINE

## 2025-01-01 RX ORDER — PROPOFOL 10 MG/ML
0-50 INJECTION, EMULSION INTRAVENOUS CONTINUOUS
Status: DISCONTINUED | OUTPATIENT
Start: 2025-01-01 | End: 2025-03-11 | Stop reason: HOSPADM

## 2025-01-01 RX ORDER — CEFTRIAXONE 2 G/1
2 INJECTION, POWDER, FOR SOLUTION INTRAMUSCULAR; INTRAVENOUS
Status: DISCONTINUED | OUTPATIENT
Start: 2025-01-01 | End: 2025-01-01

## 2025-01-01 RX ORDER — NOREPINEPHRINE BITARTRATE 0.02 MG/ML
0-3 INJECTION, SOLUTION INTRAVENOUS CONTINUOUS
Status: DISCONTINUED | OUTPATIENT
Start: 2025-01-01 | End: 2025-01-01

## 2025-01-01 RX ORDER — LOPERAMIDE HYDROCHLORIDE 2 MG/1
2 CAPSULE ORAL 3 TIMES DAILY
Status: COMPLETED | OUTPATIENT
Start: 2025-01-01 | End: 2025-01-01

## 2025-01-01 RX ORDER — MORPHINE SULFATE 2 MG/ML
2 INJECTION, SOLUTION INTRAMUSCULAR; INTRAVENOUS EVERY 4 HOURS PRN
Status: DISCONTINUED | OUTPATIENT
Start: 2025-01-01 | End: 2025-01-01

## 2025-01-01 RX ORDER — OXYCODONE HYDROCHLORIDE 5 MG/1
5 TABLET ORAL EVERY 6 HOURS PRN
Refills: 0 | Status: DISCONTINUED | OUTPATIENT
Start: 2025-01-01 | End: 2025-01-01

## 2025-01-01 RX ORDER — ACETAMINOPHEN 325 MG/1
650 TABLET ORAL EVERY 4 HOURS PRN
Status: DISCONTINUED | OUTPATIENT
Start: 2025-01-01 | End: 2025-01-01

## 2025-01-01 RX ORDER — ONDANSETRON HYDROCHLORIDE 2 MG/ML
4 INJECTION, SOLUTION INTRAVENOUS EVERY 8 HOURS PRN
Status: DISCONTINUED | OUTPATIENT
Start: 2025-01-01 | End: 2025-01-01

## 2025-01-01 RX ORDER — GLUCAGON 1 MG
1 KIT INJECTION
Status: DISCONTINUED | OUTPATIENT
Start: 2025-01-01 | End: 2025-01-01 | Stop reason: HOSPADM

## 2025-01-01 RX ORDER — ACETAMINOPHEN 325 MG/1
650 TABLET ORAL EVERY 8 HOURS PRN
Status: DISCONTINUED | OUTPATIENT
Start: 2025-01-01 | End: 2025-01-01

## 2025-01-01 RX ORDER — PHYTONADIONE 1 MG/.5ML
1 INJECTION, EMULSION INTRAMUSCULAR; INTRAVENOUS; SUBCUTANEOUS DAILY
Status: DISCONTINUED | OUTPATIENT
Start: 2025-01-01 | End: 2025-01-01

## 2025-01-01 RX ORDER — POTASSIUM CHLORIDE 29.8 MG/ML
40 INJECTION INTRAVENOUS ONCE
Status: COMPLETED | OUTPATIENT
Start: 2025-01-01 | End: 2025-01-01

## 2025-01-01 RX ORDER — VASOPRESSIN IN DEXTROSE 5 % 25/250 ML
0.04 PLASTIC BAG, INJECTION (ML) INTRAVENOUS CONTINUOUS
Status: DISCONTINUED | OUTPATIENT
Start: 2025-01-01 | End: 2025-01-01

## 2025-01-01 RX ORDER — OXYMETAZOLINE HCL 0.05 %
2 SPRAY, NON-AEROSOL (ML) NASAL
Status: COMPLETED | OUTPATIENT
Start: 2025-01-01 | End: 2025-01-01

## 2025-01-01 RX ORDER — SODIUM CHLORIDE 9 MG/ML
1000 INJECTION, SOLUTION INTRAVENOUS
Status: DISCONTINUED | OUTPATIENT
Start: 2025-01-01 | End: 2025-01-01

## 2025-01-01 RX ORDER — ZOLPIDEM TARTRATE 5 MG/1
5 TABLET ORAL DAILY PRN
Status: DISCONTINUED | OUTPATIENT
Start: 2025-01-01 | End: 2025-01-01

## 2025-01-01 RX ORDER — MUPIROCIN 20 MG/G
OINTMENT TOPICAL 2 TIMES DAILY
Status: DISCONTINUED | OUTPATIENT
Start: 2025-01-01 | End: 2025-01-01 | Stop reason: HOSPADM

## 2025-01-01 RX ORDER — PROPOFOL 10 MG/ML
INJECTION, EMULSION INTRAVENOUS
Status: DISPENSED
Start: 2025-01-01 | End: 2025-01-01

## 2025-01-01 RX ORDER — FENTANYL CITRATE-0.9 % NACL/PF 10 MCG/ML
0-250 PLASTIC BAG, INJECTION (ML) INTRAVENOUS CONTINUOUS
Refills: 0 | Status: DISCONTINUED | OUTPATIENT
Start: 2025-01-01 | End: 2025-03-11 | Stop reason: HOSPADM

## 2025-01-01 RX ORDER — HYDROCODONE BITARTRATE AND ACETAMINOPHEN 500; 5 MG/1; MG/1
TABLET ORAL
Status: DISCONTINUED | OUTPATIENT
Start: 2025-01-01 | End: 2025-01-01

## 2025-01-01 RX ORDER — GLUCAGON 1 MG
1 KIT INJECTION
Status: DISCONTINUED | OUTPATIENT
Start: 2025-01-01 | End: 2025-01-01

## 2025-01-01 RX ORDER — ELECTROLYTES/DEXTROSE
400 SOLUTION, ORAL ORAL
Status: DISCONTINUED | OUTPATIENT
Start: 2025-01-01 | End: 2025-01-01 | Stop reason: HOSPADM

## 2025-01-01 RX ORDER — NALOXONE HCL 0.4 MG/ML
0.02 VIAL (ML) INJECTION
Status: DISCONTINUED | OUTPATIENT
Start: 2025-01-01 | End: 2025-01-01 | Stop reason: HOSPADM

## 2025-01-01 RX ORDER — TALC
6 POWDER (GRAM) TOPICAL NIGHTLY PRN
Status: DISCONTINUED | OUTPATIENT
Start: 2025-01-01 | End: 2025-01-01

## 2025-01-01 RX ORDER — ONDANSETRON 4 MG/1
4 TABLET, ORALLY DISINTEGRATING ORAL EVERY 8 HOURS PRN
COMMUNITY
Start: 2025-01-01

## 2025-01-01 RX ORDER — CEFEPIME HYDROCHLORIDE 2 G/1
2 INJECTION, POWDER, FOR SOLUTION INTRAVENOUS
Status: COMPLETED | OUTPATIENT
Start: 2025-01-01 | End: 2025-01-01

## 2025-01-01 RX ORDER — NYSTATIN 100000 [USP'U]/ML
500000 SUSPENSION ORAL
Status: DISCONTINUED | OUTPATIENT
Start: 2025-01-01 | End: 2025-01-01 | Stop reason: HOSPADM

## 2025-01-01 RX ORDER — IBUPROFEN 200 MG
16 TABLET ORAL
Status: DISCONTINUED | OUTPATIENT
Start: 2025-01-01 | End: 2025-01-01 | Stop reason: HOSPADM

## 2025-01-01 RX ORDER — IBUPROFEN 200 MG
24 TABLET ORAL
Status: DISCONTINUED | OUTPATIENT
Start: 2025-01-01 | End: 2025-01-01 | Stop reason: HOSPADM

## 2025-01-01 RX ORDER — POTASSIUM CHLORIDE 7.45 MG/ML
10 INJECTION INTRAVENOUS
Status: COMPLETED | OUTPATIENT
Start: 2025-01-01 | End: 2025-01-01

## 2025-01-01 RX ORDER — LIDOCAINE HYDROCHLORIDE AND EPINEPHRINE 10; 20 UG/ML; MG/ML
4 INJECTION, SOLUTION INFILTRATION; PERINEURAL ONCE
Status: COMPLETED | OUTPATIENT
Start: 2025-01-01 | End: 2025-01-01

## 2025-01-01 RX ORDER — ETOMIDATE 2 MG/ML
20 INJECTION INTRAVENOUS ONCE
Status: COMPLETED | OUTPATIENT
Start: 2025-01-01 | End: 2025-01-01

## 2025-01-01 RX ORDER — POLYETHYLENE GLYCOL 3350 17 G/17G
17 POWDER, FOR SOLUTION ORAL 2 TIMES DAILY PRN
Status: DISCONTINUED | OUTPATIENT
Start: 2025-01-01 | End: 2025-01-01 | Stop reason: HOSPADM

## 2025-01-01 RX ORDER — CALCIUM GLUCONATE 20 MG/ML
1 INJECTION, SOLUTION INTRAVENOUS
Status: COMPLETED | OUTPATIENT
Start: 2025-01-01 | End: 2025-01-01

## 2025-01-01 RX ORDER — SODIUM CHLORIDE 9 MG/ML
INJECTION, SOLUTION INTRAVENOUS CONTINUOUS
Status: DISCONTINUED | OUTPATIENT
Start: 2025-01-01 | End: 2025-01-01

## 2025-01-01 RX ORDER — PROCHLORPERAZINE EDISYLATE 5 MG/ML
5 INJECTION INTRAMUSCULAR; INTRAVENOUS EVERY 6 HOURS PRN
Status: DISCONTINUED | OUTPATIENT
Start: 2025-01-01 | End: 2025-01-01

## 2025-01-01 RX ORDER — FAMOTIDINE 10 MG/ML
20 INJECTION, SOLUTION INTRAVENOUS DAILY
Status: DISCONTINUED | OUTPATIENT
Start: 2025-01-01 | End: 2025-01-01

## 2025-01-01 RX ORDER — CALCIUM GLUCONATE 20 MG/ML
1 INJECTION, SOLUTION INTRAVENOUS ONCE
Status: COMPLETED | OUTPATIENT
Start: 2025-01-01 | End: 2025-01-01

## 2025-01-01 RX ORDER — ROCURONIUM BROMIDE 10 MG/ML
50 INJECTION, SOLUTION INTRAVENOUS ONCE
Status: COMPLETED | OUTPATIENT
Start: 2025-01-01 | End: 2025-01-01

## 2025-01-01 RX ORDER — LORAZEPAM 2 MG/ML
1 INJECTION INTRAMUSCULAR ONCE
Status: DISCONTINUED | OUTPATIENT
Start: 2025-01-01 | End: 2025-01-01

## 2025-01-01 RX ORDER — MUPIROCIN 20 MG/G
OINTMENT TOPICAL 2 TIMES DAILY
Status: DISCONTINUED | OUTPATIENT
Start: 2025-01-01 | End: 2025-01-01

## 2025-01-01 RX ORDER — ZOLPIDEM TARTRATE 5 MG/1
5 TABLET ORAL NIGHTLY PRN
Status: COMPLETED | OUTPATIENT
Start: 2025-01-01 | End: 2025-01-01

## 2025-01-01 RX ORDER — TRAMADOL HYDROCHLORIDE 50 MG/1
50 TABLET ORAL EVERY 6 HOURS PRN
Status: DISCONTINUED | OUTPATIENT
Start: 2025-01-01 | End: 2025-01-01 | Stop reason: HOSPADM

## 2025-01-01 RX ORDER — TRANEXAMIC ACID 100 MG/ML
500 INJECTION, SOLUTION INTRAVENOUS ONCE
Status: COMPLETED | OUTPATIENT
Start: 2025-01-01 | End: 2025-01-01

## 2025-01-01 RX ORDER — ENOXAPARIN SODIUM 100 MG/ML
40 INJECTION SUBCUTANEOUS EVERY 24 HOURS
Status: DISCONTINUED | OUTPATIENT
Start: 2025-01-01 | End: 2025-01-01

## 2025-01-01 RX ORDER — INSULIN ASPART 100 [IU]/ML
0-5 INJECTION, SOLUTION INTRAVENOUS; SUBCUTANEOUS EVERY 6 HOURS PRN
Status: DISCONTINUED | OUTPATIENT
Start: 2025-01-01 | End: 2025-01-01

## 2025-01-01 RX ORDER — CHLORHEXIDINE GLUCONATE ORAL RINSE 1.2 MG/ML
15 SOLUTION DENTAL 2 TIMES DAILY
Status: DISCONTINUED | OUTPATIENT
Start: 2025-01-01 | End: 2025-01-01

## 2025-01-01 RX ORDER — NYSTATIN 100000 [USP'U]/ML
500000 SUSPENSION ORAL
Qty: 200 ML | Refills: 0 | Status: SHIPPED | OUTPATIENT
Start: 2025-01-01 | End: 2025-01-01

## 2025-01-01 RX ORDER — ALUMINUM HYDROXIDE, MAGNESIUM HYDROXIDE, AND SIMETHICONE 1200; 120; 1200 MG/30ML; MG/30ML; MG/30ML
30 SUSPENSION ORAL 4 TIMES DAILY PRN
Status: DISCONTINUED | OUTPATIENT
Start: 2025-01-01 | End: 2025-01-01 | Stop reason: HOSPADM

## 2025-01-01 RX ORDER — HEPARIN 100 UNIT/ML
5 SYRINGE INTRAVENOUS ONCE
Status: COMPLETED | OUTPATIENT
Start: 2025-01-01 | End: 2025-01-01

## 2025-01-01 RX ORDER — ONDANSETRON HYDROCHLORIDE 2 MG/ML
8 INJECTION, SOLUTION INTRAVENOUS EVERY 8 HOURS PRN
Status: DISCONTINUED | OUTPATIENT
Start: 2025-01-01 | End: 2025-01-01 | Stop reason: HOSPADM

## 2025-01-01 RX ORDER — SODIUM CHLORIDE 0.9 % (FLUSH) 0.9 %
10 SYRINGE (ML) INJECTION
Status: DISCONTINUED | OUTPATIENT
Start: 2025-01-01 | End: 2025-01-01

## 2025-01-01 RX ORDER — MORPHINE SULFATE 10 MG/ML
5 INJECTION INTRAMUSCULAR; INTRAVENOUS; SUBCUTANEOUS
Status: DISCONTINUED | OUTPATIENT
Start: 2025-01-01 | End: 2025-03-11 | Stop reason: HOSPADM

## 2025-01-01 RX ORDER — SODIUM CHLORIDE 0.9 % (FLUSH) 0.9 %
10 SYRINGE (ML) INJECTION EVERY 12 HOURS PRN
Status: DISCONTINUED | OUTPATIENT
Start: 2025-01-01 | End: 2025-01-01 | Stop reason: HOSPADM

## 2025-01-01 RX ADMIN — MORPHINE SULFATE 5 MG: 10 INJECTION, SOLUTION INTRAMUSCULAR; INTRAVENOUS at 06:03

## 2025-01-01 RX ADMIN — PROPOFOL 30 MCG/KG/MIN: 10 INJECTION, EMULSION INTRAVENOUS at 02:03

## 2025-01-01 RX ADMIN — NOREPINEPHRINE BITARTRATE 0.02 MCG/KG/MIN: 0.02 INJECTION, SOLUTION INTRAVENOUS at 06:03

## 2025-01-01 RX ADMIN — NOREPINEPHRINE BITARTRATE 0.34 MCG/KG/MIN: 0.02 INJECTION, SOLUTION INTRAVENOUS at 10:03

## 2025-01-01 RX ADMIN — SODIUM BICARBONATE: 84 INJECTION, SOLUTION INTRAVENOUS at 09:03

## 2025-01-01 RX ADMIN — SODIUM CHLORIDE: 9 INJECTION, SOLUTION INTRAVENOUS at 06:03

## 2025-01-01 RX ADMIN — Medication 75 MCG/HR: at 08:03

## 2025-01-01 RX ADMIN — TRAMADOL HYDROCHLORIDE 50 MG: 50 TABLET, COATED ORAL at 07:03

## 2025-01-01 RX ADMIN — MUPIROCIN: 20 OINTMENT TOPICAL at 09:03

## 2025-01-01 RX ADMIN — MORPHINE SULFATE 5 MG: 10 INJECTION, SOLUTION INTRAMUSCULAR; INTRAVENOUS at 09:03

## 2025-01-01 RX ADMIN — PIPERACILLIN AND TAZOBACTAM 4.5 G: 4; .5 INJECTION, POWDER, LYOPHILIZED, FOR SOLUTION INTRAVENOUS; PARENTERAL at 05:03

## 2025-01-01 RX ADMIN — Medication 50 MCG/HR: at 08:03

## 2025-01-01 RX ADMIN — OXYMETAZOLINE HYDROCHLORIDE 2 SPRAY: 0.5 SPRAY NASAL at 02:03

## 2025-01-01 RX ADMIN — PROPOFOL 40 MCG/KG/MIN: 10 INJECTION, EMULSION INTRAVENOUS at 10:03

## 2025-01-01 RX ADMIN — Medication 400 ML: at 08:03

## 2025-01-01 RX ADMIN — SODIUM CHLORIDE 1000 ML: 9 INJECTION, SOLUTION INTRAVENOUS at 01:03

## 2025-01-01 RX ADMIN — FAMOTIDINE 20 MG: 10 INJECTION, SOLUTION INTRAVENOUS at 10:03

## 2025-01-01 RX ADMIN — POTASSIUM BICARBONATE 25 MEQ: 978 TABLET, EFFERVESCENT ORAL at 04:03

## 2025-01-01 RX ADMIN — NYSTATIN 500000 UNITS: 100000 SUSPENSION ORAL at 04:03

## 2025-01-01 RX ADMIN — MUPIROCIN: 20 OINTMENT TOPICAL at 08:03

## 2025-01-01 RX ADMIN — TRANEXAMIC ACID 500 MG: 100 INJECTION, SOLUTION INTRAVENOUS at 05:03

## 2025-01-01 RX ADMIN — NYSTATIN 500000 UNITS: 100000 SUSPENSION ORAL at 08:03

## 2025-01-01 RX ADMIN — SODIUM CHLORIDE 1500 ML: 0.9 INJECTION, SOLUTION INTRAVENOUS at 03:03

## 2025-01-01 RX ADMIN — PHYTONADIONE 1 MG: 10 INJECTION, EMULSION INTRAMUSCULAR; INTRAVENOUS; SUBCUTANEOUS at 10:03

## 2025-01-01 RX ADMIN — Medication 400 ML: at 02:03

## 2025-01-01 RX ADMIN — SODIUM CHLORIDE: 9 INJECTION, SOLUTION INTRAVENOUS at 07:03

## 2025-01-01 RX ADMIN — SODIUM BICARBONATE: 84 INJECTION, SOLUTION INTRAVENOUS at 01:03

## 2025-01-01 RX ADMIN — CEFTRIAXONE 2 G: 2 INJECTION, POWDER, FOR SOLUTION INTRAMUSCULAR; INTRAVENOUS at 10:03

## 2025-01-01 RX ADMIN — LIDOCAINE HYDROCHLORIDE 15 ML: 20 SOLUTION ORAL; TOPICAL at 08:03

## 2025-01-01 RX ADMIN — SODIUM CHLORIDE: 9 INJECTION, SOLUTION INTRAVENOUS at 02:03

## 2025-01-01 RX ADMIN — MORPHINE SULFATE 5 MG: 10 INJECTION, SOLUTION INTRAMUSCULAR; INTRAVENOUS at 08:03

## 2025-01-01 RX ADMIN — SODIUM CHLORIDE: 9 INJECTION, SOLUTION INTRAVENOUS at 04:03

## 2025-01-01 RX ADMIN — ROCURONIUM BROMIDE 50 MG: 10 INJECTION, SOLUTION INTRAVENOUS at 06:03

## 2025-01-01 RX ADMIN — ZOLPIDEM TARTRATE 5 MG: 5 TABLET, FILM COATED ORAL at 08:03

## 2025-01-01 RX ADMIN — CEFEPIME 2 G: 2 INJECTION, POWDER, FOR SOLUTION INTRAVENOUS at 02:03

## 2025-01-01 RX ADMIN — LOPERAMIDE HYDROCHLORIDE 2 MG: 2 CAPSULE ORAL at 08:03

## 2025-01-01 RX ADMIN — Medication 400 ML: at 05:03

## 2025-01-01 RX ADMIN — TRANEXAMIC ACID 700 MG: 100 INJECTION, SOLUTION INTRAVENOUS at 08:03

## 2025-01-01 RX ADMIN — CEFTRIAXONE 2 G: 2 INJECTION, POWDER, FOR SOLUTION INTRAMUSCULAR; INTRAVENOUS at 08:03

## 2025-01-01 RX ADMIN — NOREPINEPHRINE BITARTRATE 0.8 MCG/KG/MIN: 1 INJECTION, SOLUTION, CONCENTRATE INTRAVENOUS at 03:03

## 2025-01-01 RX ADMIN — CALCIUM GLUCONATE 1 G: 20 INJECTION, SOLUTION INTRAVENOUS at 10:03

## 2025-01-01 RX ADMIN — Medication 400 ML: at 09:03

## 2025-01-01 RX ADMIN — LIDOCAINE HYDROCHLORIDE 15 ML: 20 SOLUTION ORAL; TOPICAL at 09:03

## 2025-01-01 RX ADMIN — SODIUM CHLORIDE: 9 INJECTION, SOLUTION INTRAVENOUS at 12:03

## 2025-01-01 RX ADMIN — PIPERACILLIN AND TAZOBACTAM 4.5 G: 4; .5 INJECTION, POWDER, LYOPHILIZED, FOR SOLUTION INTRAVENOUS; PARENTERAL at 06:03

## 2025-01-01 RX ADMIN — LIDOCAINE HYDROCHLORIDE 15 ML: 20 SOLUTION ORAL; TOPICAL at 06:03

## 2025-01-01 RX ADMIN — PHYTONADIONE 5 MG: 10 INJECTION, EMULSION INTRAMUSCULAR; INTRAVENOUS; SUBCUTANEOUS at 11:03

## 2025-01-01 RX ADMIN — FLUCONAZOLE 100 MG: 2 INJECTION, SOLUTION INTRAVENOUS at 11:03

## 2025-01-01 RX ADMIN — Medication 75 MCG/HR: at 03:03

## 2025-01-01 RX ADMIN — PIPERACILLIN AND TAZOBACTAM 4.5 G: 4; .5 INJECTION, POWDER, LYOPHILIZED, FOR SOLUTION INTRAVENOUS; PARENTERAL at 09:03

## 2025-01-01 RX ADMIN — Medication 400 ML: at 04:03

## 2025-01-01 RX ADMIN — SODIUM CHLORIDE: 9 INJECTION, SOLUTION INTRAVENOUS at 03:03

## 2025-01-01 RX ADMIN — PROPOFOL 30 MCG/KG/MIN: 10 INJECTION, EMULSION INTRAVENOUS at 06:03

## 2025-01-01 RX ADMIN — SODIUM BICARBONATE: 84 INJECTION, SOLUTION INTRAVENOUS at 06:03

## 2025-01-01 RX ADMIN — VASOPRESSIN 0.04 UNITS/MIN: 0.2 INJECTION INTRAVENOUS at 03:03

## 2025-01-01 RX ADMIN — SODIUM CHLORIDE 1000 ML: 9 INJECTION, SOLUTION INTRAVENOUS at 02:03

## 2025-01-01 RX ADMIN — NOREPINEPHRINE BITARTRATE 0.46 MCG/KG/MIN: 0.02 INJECTION, SOLUTION INTRAVENOUS at 04:03

## 2025-01-01 RX ADMIN — NOREPINEPHRINE BITARTRATE 0.44 MCG/KG/MIN: 1 INJECTION, SOLUTION, CONCENTRATE INTRAVENOUS at 11:03

## 2025-01-01 RX ADMIN — PROPOFOL 30 MCG/KG/MIN: 10 INJECTION, EMULSION INTRAVENOUS at 08:03

## 2025-01-01 RX ADMIN — MORPHINE SULFATE 5 MG: 10 INJECTION, SOLUTION INTRAMUSCULAR; INTRAVENOUS at 07:03

## 2025-01-01 RX ADMIN — PHYTONADIONE 1 MG: 10 INJECTION, EMULSION INTRAMUSCULAR; INTRAVENOUS; SUBCUTANEOUS at 05:03

## 2025-01-01 RX ADMIN — ETOMIDATE 20 MG: 2 INJECTION INTRAVENOUS at 06:03

## 2025-01-01 RX ADMIN — POTASSIUM CHLORIDE 10 MEQ: 7.46 INJECTION, SOLUTION INTRAVENOUS at 04:03

## 2025-01-01 RX ADMIN — PROPOFOL 10 MCG/KG/MIN: 10 INJECTION, EMULSION INTRAVENOUS at 06:03

## 2025-01-01 RX ADMIN — CEFTRIAXONE SODIUM 2 G: 2 INJECTION, POWDER, FOR SOLUTION INTRAMUSCULAR; INTRAVENOUS at 12:03

## 2025-01-01 RX ADMIN — DIPHENHYDRAMINE HYDROCHLORIDE 15 ML: 12.5 SOLUTION ORAL at 02:03

## 2025-01-01 RX ADMIN — POTASSIUM CHLORIDE 10 MEQ: 7.46 INJECTION, SOLUTION INTRAVENOUS at 05:03

## 2025-01-01 RX ADMIN — CALCIUM GLUCONATE 1 G: 20 INJECTION, SOLUTION INTRAVENOUS at 09:03

## 2025-01-01 RX ADMIN — CALCIUM GLUCONATE 1 G: 20 INJECTION, SOLUTION INTRAVENOUS at 08:03

## 2025-01-01 RX ADMIN — PROMETHAZINE HYDROCHLORIDE 12.5 MG: 25 INJECTION INTRAMUSCULAR; INTRAVENOUS at 06:03

## 2025-01-01 RX ADMIN — NYSTATIN 500000 UNITS: 100000 SUSPENSION ORAL at 12:03

## 2025-01-01 RX ADMIN — LIDOCAINE HYDROCHLORIDE,EPINEPHRINE BITARTRATE 4 ML: 20; .01 INJECTION, SOLUTION INFILTRATION; PERINEURAL at 09:03

## 2025-01-01 RX ADMIN — SODIUM CHLORIDE 1503 ML: 9 INJECTION, SOLUTION INTRAVENOUS at 01:03

## 2025-01-01 RX ADMIN — HEPARIN 500 UNITS: 100 SYRINGE at 11:03

## 2025-01-01 RX ADMIN — Medication 400 ML: at 06:03

## 2025-01-01 RX ADMIN — LIDOCAINE HYDROCHLORIDE 15 ML: 20 SOLUTION ORAL; TOPICAL at 12:03

## 2025-01-01 RX ADMIN — POTASSIUM CHLORIDE 40 MEQ: 29.8 INJECTION, SOLUTION INTRAVENOUS at 10:03

## 2025-01-01 RX ADMIN — MUPIROCIN: 20 OINTMENT TOPICAL at 10:03

## 2025-01-01 RX ADMIN — LOPERAMIDE HYDROCHLORIDE 2 MG: 2 CAPSULE ORAL at 02:03

## 2025-01-01 RX ADMIN — PIPERACILLIN AND TAZOBACTAM 4.5 G: 4; .5 INJECTION, POWDER, LYOPHILIZED, FOR SOLUTION INTRAVENOUS; PARENTERAL at 03:03

## 2025-01-01 RX ADMIN — VASOPRESSIN 0.04 UNITS/MIN: 0.2 INJECTION INTRAVENOUS at 05:03

## 2025-01-01 RX ADMIN — NOREPINEPHRINE BITARTRATE 0.28 MCG/KG/MIN: 0.02 INJECTION, SOLUTION INTRAVENOUS at 01:03

## 2025-01-01 RX ADMIN — ZOLPIDEM TARTRATE 5 MG: 5 TABLET, FILM COATED ORAL at 06:03

## 2025-01-01 RX ADMIN — NYSTATIN 500000 UNITS: 100000 SUSPENSION ORAL at 07:03

## 2025-01-01 RX ADMIN — ZOLPIDEM TARTRATE 5 MG: 5 TABLET, FILM COATED ORAL at 12:03

## 2025-01-01 RX ADMIN — NOREPINEPHRINE BITARTRATE 0.32 MCG/KG/MIN: 0.02 INJECTION, SOLUTION INTRAVENOUS at 07:03

## 2025-01-01 RX ADMIN — ENOXAPARIN SODIUM 40 MG: 40 INJECTION SUBCUTANEOUS at 06:03

## 2025-01-01 RX ADMIN — VASOPRESSIN 0.04 UNITS/MIN: 0.2 INJECTION INTRAVENOUS at 11:03

## 2025-01-01 RX ADMIN — VANCOMYCIN HYDROCHLORIDE 1250 MG: 1.25 INJECTION, POWDER, LYOPHILIZED, FOR SOLUTION INTRAVENOUS at 12:03

## 2025-02-03 ENCOUNTER — TELEPHONE (OUTPATIENT)
Dept: OBSTETRICS AND GYNECOLOGY | Facility: CLINIC | Age: 50
End: 2025-02-03

## 2025-02-03 ENCOUNTER — OFFICE VISIT (OUTPATIENT)
Dept: OBSTETRICS AND GYNECOLOGY | Facility: CLINIC | Age: 50
End: 2025-02-03
Payer: COMMERCIAL

## 2025-02-03 VITALS — DIASTOLIC BLOOD PRESSURE: 96 MMHG | HEIGHT: 62 IN | BODY MASS INDEX: 29.15 KG/M2 | SYSTOLIC BLOOD PRESSURE: 146 MMHG

## 2025-02-03 DIAGNOSIS — Z01.419 WELL WOMAN EXAM WITH ROUTINE GYNECOLOGICAL EXAM: Primary | ICD-10-CM

## 2025-02-03 DIAGNOSIS — N63.21 MASS OF UPPER OUTER QUADRANT OF LEFT BREAST: ICD-10-CM

## 2025-02-03 DIAGNOSIS — D21.9 FIBROIDS: ICD-10-CM

## 2025-02-03 PROCEDURE — 99999 PR PBB SHADOW E&M-EST. PATIENT-LVL III: CPT | Mod: PBBFAC,,, | Performed by: OBSTETRICS & GYNECOLOGY

## 2025-02-03 PROCEDURE — 3077F SYST BP >= 140 MM HG: CPT | Mod: CPTII,S$GLB,, | Performed by: OBSTETRICS & GYNECOLOGY

## 2025-02-03 PROCEDURE — 1160F RVW MEDS BY RX/DR IN RCRD: CPT | Mod: CPTII,S$GLB,, | Performed by: OBSTETRICS & GYNECOLOGY

## 2025-02-03 PROCEDURE — 3008F BODY MASS INDEX DOCD: CPT | Mod: CPTII,S$GLB,, | Performed by: OBSTETRICS & GYNECOLOGY

## 2025-02-03 PROCEDURE — 1159F MED LIST DOCD IN RCRD: CPT | Mod: CPTII,S$GLB,, | Performed by: OBSTETRICS & GYNECOLOGY

## 2025-02-03 PROCEDURE — 99396 PREV VISIT EST AGE 40-64: CPT | Mod: S$GLB,,, | Performed by: OBSTETRICS & GYNECOLOGY

## 2025-02-03 PROCEDURE — 3080F DIAST BP >= 90 MM HG: CPT | Mod: CPTII,S$GLB,, | Performed by: OBSTETRICS & GYNECOLOGY

## 2025-02-03 RX ORDER — NORGESTIMATE AND ETHINYL ESTRADIOL 0.25-0.035
1 KIT ORAL DAILY
Qty: 84 TABLET | Refills: 4 | Status: SHIPPED | OUTPATIENT
Start: 2025-02-03 | End: 2025-02-03

## 2025-02-03 NOTE — TELEPHONE ENCOUNTER
Patient was seen by Dr Scottie BARGER and would like patient seen for breast mass.  Patient will have mammogram and breast ultrasound on Wednesday 02/05/25.  Can you please assist patient with scheduling a consult.

## 2025-02-03 NOTE — PROGRESS NOTES
Subjective     Patient ID: Serina Barriga is a 49 y.o. female.    Chief Complaint:  Annual Exam and Breast Mass      History of Present Illness  HPI  Annual Exam-Premenopausal  Patient presents for annual exam. The patient reports noting a breast mass and skin changes in her UOQ left breast.  Pt states she always had an inverted nipple on the left, but notes the skin around the nipple looking different. The patient is sexually active. GYN screening history: last pap: approximate date 2022 and was normal and last mammogram: approximate date 2024 and was normal. The patient wears seatbelts: yes. The patient participates in regular exercise: yes. Has the patient ever been transfused or tattooed?: yes. The patient reports that there is not domestic violence in her life.  States menses have been well controlled with OCP and requests refill.  Has periods every 3 months and last 2-3 days.  Denies excessive bleeding or cramping.    GYN & OB History  No LMP recorded. (Menstrual status: Birth Control).   Date of Last Pap: 2022    OB History    Para Term  AB Living   2 2 2     2   SAB IAB Ectopic Multiple Live Births           2      # Outcome Date GA Lbr Doug/2nd Weight Sex Type Anes PTL Lv   2 Term      Vag-Spont   YUE   1 Term      Vag-Spont   YUE       Review of Systems  Review of Systems   Constitutional:  Negative for activity change, appetite change, chills, fatigue, fever and unexpected weight change.   Respiratory:  Negative for shortness of breath.    Cardiovascular:  Negative for chest pain, palpitations and leg swelling.   Gastrointestinal:  Negative for abdominal pain, bloating, blood in stool, constipation, diarrhea, nausea and vomiting.   Genitourinary:  Negative for dysmenorrhea, dyspareunia, dysuria, flank pain, frequency, genital sores, hematuria, menorrhagia, menstrual problem, pelvic pain, urgency, vaginal bleeding, vaginal discharge, vaginal pain, urinary incontinence,  postcoital bleeding, vaginal dryness and vaginal odor.   Musculoskeletal:  Negative for back pain.   Integumentary:  Positive for breast mass and breast tenderness. Negative for nipple discharge and breast skin changes.   Neurological:  Negative for syncope and headaches.   Breast: Positive for mass and tenderness.Negative for asymmetry, lump, mastodynia, nipple discharge and skin changes         Objective   Physical Exam:   Constitutional: She is oriented to person, place, and time. She appears well-developed and well-nourished. No distress.    HENT:   Head: Normocephalic and atraumatic.    Eyes: Pupils are equal, round, and reactive to light. EOM are normal.     Cardiovascular:  Normal rate, regular rhythm and normal heart sounds.             Pulmonary/Chest: Effort normal and breath sounds normal. Right breast exhibits no inverted nipple, no mass, no nipple discharge, no skin change, no tenderness, no bleeding and no swelling. Left breast exhibits inverted nipple, mass and tenderness. Left breast exhibits no nipple discharge, no skin change, no bleeding and no swelling. Breasts are symmetrical.            Abdominal: Soft. Bowel sounds are normal. She exhibits no distension. There is no abdominal tenderness.     Genitourinary:    Vagina, uterus, right adnexa and left adnexa normal.      Pelvic exam was performed with patient supine.   There is no rash, tenderness, lesion or injury on the right labia. There is no rash, tenderness, lesion or injury on the left labia. Cervix is normal. Right adnexum displays no mass, no tenderness and no fullness. Left adnexum displays no mass, no tenderness and no fullness. No erythema, vaginal discharge, tenderness or bleeding in the vagina.    No foreign body in the vagina.      No signs of injury in the vagina.   Cervix exhibits no motion tenderness, no discharge and no friability. Uterus is not deviated, not enlarged and not tender.           Musculoskeletal: Normal range of  motion and moves all extremeties. No tenderness or edema.       Neurological: She is alert and oriented to person, place, and time.    Skin: Skin is warm and dry.    Psychiatric: She has a normal mood and affect. Her behavior is normal. Thought content normal.            Assessment and Plan     1. Well woman exam with routine gynecological exam    2. Fibroids    3. Mass of upper outer quadrant of left breast           Plan:  Well woman exam with routine gynecological exam  -     Pt was counseled on cervical/vaginal screening guidelines and recommendations.  Last pap NILM HPV neg on 11/2022.  As per current ASCCP guidelines, next pap is due after 11/2025.  -     Follow up with PCP for routine health maintenance needs.    Fibroids  -     norgestimate-ethinyl estradioL (ESTARYLLA) 0.25-35 mg-mcg per tablet; Take 1 tablet by mouth once daily.  Dispense: 84 tablet; Refill: 4  -     Pt with excellent results with OCP.  However, now with concerning left breast mass.  Thus, advise pt to discontinue OCP now and await completion of breast evaluation.  Will consider restarting OCP if work-up benign.    Mass of upper outer quadrant of left breast  -     Mammo Digital Diagnostic Left with Lee; Future; Expected date: 02/03/2025  -     US Breast Left Complete; Future; Expected date: 02/03/2025  -     Ambulatory referral/consult to Breast Surgery; Future; Expected date: 02/10/2025  -     Findings on examination are concerning.  Recommend evaluation with MMG and US and referral to breast surgeon.  Cease OCP use until further notice.      Follow up in about 1 year (around 2/3/2026).

## 2025-02-04 ENCOUNTER — TELEPHONE (OUTPATIENT)
Dept: SURGERY | Facility: CLINIC | Age: 50
End: 2025-02-04
Payer: COMMERCIAL

## 2025-02-04 NOTE — TELEPHONE ENCOUNTER
Phone call placed to pt on behalf of referral that was placed from .. no answer. Left a brief detailed msg with call bk number, and with appt information.

## 2025-02-05 ENCOUNTER — HOSPITAL ENCOUNTER (OUTPATIENT)
Dept: RADIOLOGY | Facility: HOSPITAL | Age: 50
Discharge: HOME | End: 2025-02-05
Attending: OBSTETRICS & GYNECOLOGY
Payer: COMMERCIAL

## 2025-02-05 ENCOUNTER — TELEPHONE (OUTPATIENT)
Dept: RADIOLOGY | Facility: HOSPITAL | Age: 50
End: 2025-02-05

## 2025-02-05 VITALS — WEIGHT: 159.38 LBS | BODY MASS INDEX: 29.33 KG/M2 | HEIGHT: 62 IN

## 2025-02-05 DIAGNOSIS — N63.21 MASS OF UPPER OUTER QUADRANT OF LEFT BREAST: Primary | ICD-10-CM

## 2025-02-05 DIAGNOSIS — N63.21 MASS OF UPPER OUTER QUADRANT OF LEFT BREAST: ICD-10-CM

## 2025-02-05 PROCEDURE — 77061 BREAST TOMOSYNTHESIS UNI: CPT | Mod: 26,LT,, | Performed by: RADIOLOGY

## 2025-02-05 PROCEDURE — 77065 DX MAMMO INCL CAD UNI: CPT | Mod: 26,LT,, | Performed by: RADIOLOGY

## 2025-02-05 PROCEDURE — 76642 ULTRASOUND BREAST LIMITED: CPT | Mod: 26,LT,, | Performed by: RADIOLOGY

## 2025-02-05 PROCEDURE — 76642 ULTRASOUND BREAST LIMITED: CPT | Mod: TC,LT

## 2025-02-05 PROCEDURE — 77065 DX MAMMO INCL CAD UNI: CPT | Mod: TC,LT

## 2025-02-05 NOTE — TELEPHONE ENCOUNTER
Ultrasound guided biopsy x3 scheduled for 2/11/25 at 9:30, arrival time 9am.  Biopsy instructions given with understandings verbalized. Patient has my contact information.

## 2025-02-11 ENCOUNTER — HOSPITAL ENCOUNTER (OUTPATIENT)
Dept: RADIOLOGY | Facility: HOSPITAL | Age: 50
Discharge: HOME | End: 2025-02-11
Attending: OBSTETRICS & GYNECOLOGY
Payer: COMMERCIAL

## 2025-02-11 ENCOUNTER — TELEPHONE (OUTPATIENT)
Dept: OBSTETRICS AND GYNECOLOGY | Facility: CLINIC | Age: 50
End: 2025-02-11
Payer: COMMERCIAL

## 2025-02-11 DIAGNOSIS — N63.21 MASS OF UPPER OUTER QUADRANT OF LEFT BREAST: ICD-10-CM

## 2025-02-11 PROCEDURE — 88305 TISSUE EXAM BY PATHOLOGIST: CPT | Mod: 59 | Performed by: PATHOLOGY

## 2025-02-11 PROCEDURE — 19084 BX BREAST ADD LESION US IMAG: CPT | Mod: LT,,, | Performed by: RADIOLOGY

## 2025-02-11 PROCEDURE — 77065 DX MAMMO INCL CAD UNI: CPT | Mod: 26,LT,, | Performed by: RADIOLOGY

## 2025-02-11 PROCEDURE — A4648 IMPLANTABLE TISSUE MARKER: HCPCS

## 2025-02-11 PROCEDURE — 88341 IMHCHEM/IMCYTCHM EA ADD ANTB: CPT | Performed by: PATHOLOGY

## 2025-02-11 PROCEDURE — 27200940 US BIOPSY LYMPH NODE AXILLA

## 2025-02-11 PROCEDURE — 88342 IMHCHEM/IMCYTCHM 1ST ANTB: CPT | Mod: 59 | Performed by: PATHOLOGY

## 2025-02-11 PROCEDURE — 19083 BX BREAST 1ST LESION US IMAG: CPT | Mod: LT,,, | Performed by: RADIOLOGY

## 2025-02-11 PROCEDURE — 77065 DX MAMMO INCL CAD UNI: CPT | Mod: TC,LT

## 2025-02-11 PROCEDURE — 88360 TUMOR IMMUNOHISTOCHEM/MANUAL: CPT | Mod: 59 | Performed by: PATHOLOGY

## 2025-02-11 NOTE — NURSING
Pressure held on left breast biopsy sites for 10 mins, hemostasis was achieved, steri strips were applied, and wound was covered with 4x4 guaze and a tegaderm.  Dressing clean, dry and intact with no drainage noted.  Discharge instructions given verbally and in writing, patient voiced understandings.  Patient discharged and accompanied by family member.

## 2025-02-11 NOTE — TELEPHONE ENCOUNTER
Called pt but no answer.  Left message.  Reason for call: check in on pt progress (had breast biopsy today and awaiting pathology results).

## 2025-02-14 LAB
FINAL PATHOLOGIC DIAGNOSIS: NORMAL
GROSS: NORMAL
Lab: NORMAL
MICROSCOPIC EXAM: NORMAL

## 2025-02-17 ENCOUNTER — TELEPHONE (OUTPATIENT)
Dept: HEMATOLOGY/ONCOLOGY | Facility: CLINIC | Age: 50
End: 2025-02-17
Payer: COMMERCIAL

## 2025-02-17 ENCOUNTER — OFFICE VISIT (OUTPATIENT)
Dept: SURGERY | Facility: CLINIC | Age: 50
End: 2025-02-17
Payer: COMMERCIAL

## 2025-02-17 VITALS
HEIGHT: 62 IN | BODY MASS INDEX: 28.36 KG/M2 | SYSTOLIC BLOOD PRESSURE: 178 MMHG | DIASTOLIC BLOOD PRESSURE: 102 MMHG | WEIGHT: 154.13 LBS | HEART RATE: 131 BPM

## 2025-02-17 DIAGNOSIS — N63.21 MASS OF UPPER OUTER QUADRANT OF LEFT BREAST: ICD-10-CM

## 2025-02-17 DIAGNOSIS — C50.812 MALIGNANT NEOPLASM OF OVERLAPPING SITES OF LEFT BREAST IN FEMALE, ESTROGEN RECEPTOR NEGATIVE: Primary | ICD-10-CM

## 2025-02-17 DIAGNOSIS — Z17.1 MALIGNANT NEOPLASM OF OVERLAPPING SITES OF LEFT BREAST IN FEMALE, ESTROGEN RECEPTOR NEGATIVE: Primary | ICD-10-CM

## 2025-02-17 PROCEDURE — 99205 OFFICE O/P NEW HI 60 MIN: CPT | Mod: S$GLB,,, | Performed by: SURGERY

## 2025-02-17 NOTE — TELEPHONE ENCOUNTER
LVM introducing myself as ONN for Dr Lopez and advising of NP appt scheduled for 2/20 ( thurs) at  at 11am. Provided direct contact number for questions or concerns.     Pt returned call. Confirmed that appt 2/20 at  with Dr Lopez at 11am will work with her schedule. Advised that pt should keep my number for any future needs. Pt v/u.

## 2025-02-17 NOTE — NURSING
Nurse Navigator Note:     Patient saw Dr. Sutton.   and I reviewed the information discussed with the patient including treatment options, referrals, diagnosis, and future plans for workup. Referrals places for medical oncology, genetics, and oncology behavioral health. I will reach out to patient to discuss upcoming appointments as well as providing my contact information to the patient.     Oncology Navigation   Intake  Date of Diagnosis: 02/11/25  Cancer Type: Breast  Type of Referral: Internal  Date of Referral: 02/14/25  Initial Nurse Navigator Contact: 02/17/25  Referral to Initial Contact Timeline (days): 3  First Appointment Available: 02/17/25  Appointment Date: 02/17/25  First Available Date vs. Scheduled Date (days): 0  Multiple appointments: No     Treatment  Current Status: Active    Surgical Oncologist: Dr.Kyle Sutton  Consult Date: 02/17/25          Procedures: PET scan; Port / PICC    General Referrals: Genetic Counselor; Psychology    ER: Negative  HI: Negative  Her2: Postive           Acuity      Follow Up  No follow-ups on file.

## 2025-02-18 NOTE — PROGRESS NOTES
History & Physical    Subjective     History of Present Illness:  Patient is a 49 y.o. female referred for left breast cancer.  She recently noticed a left breast mass and underwent mammogram showing 2 highly suspicious left breast masses at 11 and 12 o'clock along with suspicious appearing left axillary lymph node.  She underwent core biopsy of the sites all showing invasive ductal carcinoma ER-/MS- HER2+.  The lymph node was also positive for invasive ductal carcinoma.  Family history of breast cancer in her mother and an aunt.  Denies any known genetic abnormalities that run in the family.  Menarche age 13,  1st at 24.  Premenopausal.    No chief complaint on file.      Review of patient's allergies indicates:   Allergen Reactions    Codeine Rash       Current Medications[1]    Past Medical History:   Diagnosis Date    Depression     controlled with meds    Fibroid 2022    Hypertension     It's controlled with meds.     Past Surgical History:   Procedure Laterality Date    FUNCTIONAL ENDOSCOPIC SINUS SURGERY (FESS) USING COMPUTER-ASSISTED NAVIGATION  2022     Family History   Problem Relation Name Age of Onset    Breast cancer Mother Maria Angelo 63        7 year survivor- triple negative breast cancer    Hypertension Mother Maria Angelo     Prostate cancer Father Rikki Angelo     Cancer Father Rikki Angelo         prostate cancer- survivor    Thyroid disease Maternal Aunt Sherry Lazarus     Breast cancer Paternal Aunt      Ovarian cancer Neg Hx      Colon cancer Neg Hx       Social History[2]     Review of Systems:  Review of Systems   Constitutional:  Negative for activity change and unexpected weight change.   HENT:  Negative for hearing loss, rhinorrhea and trouble swallowing.    Eyes:  Negative for discharge and visual disturbance.   Respiratory:  Negative for chest tightness and wheezing.    Cardiovascular:  Negative for chest pain and palpitations.   Gastrointestinal:   "Negative for blood in stool, constipation, diarrhea and vomiting.   Endocrine: Negative for polydipsia and polyuria.   Genitourinary:  Negative for difficulty urinating, dysuria, hematuria and menstrual problem.   Musculoskeletal:  Negative for arthralgias, joint swelling and neck pain.   Neurological:  Negative for weakness and headaches.   Psychiatric/Behavioral:  Positive for dysphoric mood. Negative for confusion.           Objective     Vital Signs (Most Recent)  Pulse: (!) 131 (02/17/25 1208)  BP: (!) 178/102 (02/17/25 1208)  5' 2" (1.575 m)  69.9 kg (154 lb 1.6 oz)     Physical Exam:  Physical Exam  Vitals reviewed.   Constitutional:       Appearance: She is well-developed.   HENT:      Head: Normocephalic and atraumatic.   Cardiovascular:      Rate and Rhythm: Normal rate.   Pulmonary:      Effort: Pulmonary effort is normal.   Chest:   Breasts:     Right: No swelling, bleeding, inverted nipple, mass, nipple discharge, skin change or tenderness.      Left: Inverted nipple (nipple retraction has always been present), mass and skin change (Skin yellowing reported the following bruising after biopsy) present. No swelling, bleeding, nipple discharge or tenderness.       Abdominal:      General: There is no distension.      Palpations: Abdomen is soft.      Tenderness: There is no abdominal tenderness.   Musculoskeletal:      Cervical back: Neck supple.   Lymphadenopathy:      Upper Body:      Right upper body: No supraclavicular, axillary or pectoral adenopathy.      Left upper body: No supraclavicular (questionable), axillary or pectoral adenopathy.   Skin:     General: Skin is warm and dry.   Neurological:      Mental Status: She is alert and oriented to person, place, and time.           Diagnostic Results:  Result:   Mammo Digital Diagnostic Left with Lee  US Breast Left Limited     History:  Patient is 49 y.o. and is seen for mass of upper outer quadrant of left breast.     Films Compared:  Prior images " (if available) were compared.     Findings:  This procedure was performed using tomosynthesis. Computer-aided detection was utilized in the interpretation of this examination.        Mammo Digital Diagnostic Left with Lee  There are scattered areas of fibroglandular density.  There is a mass seen in the upper inner quadrant of the left breast in the middle depth. This is a new finding. Associated features include architectural distortion.   There is a mass with indistinct margins seen in the upper outer quadrant of the left breast in the middle depth. This is a new finding. Associated features include architectural distortion.         US Breast Left Limited  There is a 23 mm x 16 mm x 22 mm hypoechoic mass with spiculated margins seen in the left breast at 11 o'clock. The mass correlates with the palpable finding reported by the patient.   There is a 24 mm x 19 mm x 22 mm hypoechoic mass with spiculated margins seen in the left breast at 12 o'clock.   There is a suspicious lymph node with cortical thickening seen in the left axilla.      Impression:  Left  Mass: Left breast mass at the upper inner position and middle depth. Assessment: 5 - Highly suggestive of malignancy. Ultrasound-guided biopsy is recommended.   Mass: Left breast mass at the upper outer position and middle depth. Assessment: 5 - Highly suggestive of malignancy. Ultrasound-guided biopsy is recommended.   Lymph Node: Left axilla lymph node. Assessment: 4 - Suspicious finding. Ultrasound-guided biopsy is recommended.      BI-RADS Category:   Overall: 5 - Highly Suggestive of Malignancy        Recommendation:  Ultrasound-guided biopsy is recommended.           Your estimated lifetime risk of breast cancer (to age 85) based on Tyrer-Cuzick risk assessment model is 13.84%.  According to the American Cancer Society, patients with a lifetime breast cancer risk of 20% or higher might benefit from supplemental screening tests, such as screening breast  MRI.    Final Pathologic Diagnosis 1. LEFT BREAST, 11:00 O'CLOCK, NEEDLE CORE BIOPSIES:    -  Invasive ductal carcinoma, [place] histologic grade 2, with focal micropapillary features.          Glandular (acinar)/tubular differentiation:  Score 3.            Nuclear pleomorphism: Score 2.            Mitotic rate:  Score 2.            Overall grade: Grade 2 (score 7).          Size of invasive carcinoma as measured from the H&E slides: 5 mm.    BREAST BIOMARKER TESTING  RESULTS:  Estrogen receptor (ER) status: Negative.  Progesterone receptor (PgR) status: Negative.  HER2 by IHC:  Positive (score 3+).    Ki-67 proliferation index:  50-60%.    2. LEFT BREAST, 12:00 O'CLOCK, NEEDLE CORE BIOPSIES:  -  Invasive ductal carcinoma, [place] histologic grade 2.            Glandular (acinar)/tubular differentiation:  Score 3.            Nuclear pleomorphism: Score 2.            Mitotic rate: Score 2.            Overall grade:  Grade 2 (score 7).            Size of invasive carcinoma as measured from the H&E slides:  10 mm.    BREAST BIOMARKER TESTING RESULTS:  Estrogen receptor (ER) status: Negative (less than 1% of tumor cells demonstrate weak nuclear staining).    Progesterone receptor (PgR):  Negative.    HER2 by IHC:  Positive (score 3+).  Ki-67 proliferation rate:  70-80%.    3. LEFT AXILLA, NEEDLE CORE BIOPSIES:  -  Invasive ductal carcinoma of the breast, [place] histologic grade 2, most consistent with a metastasis to an axillary lymph node (see comment).    COMMENT:  Definitive lymph node tissue is not identified in the left axilla biopsies, but there is a focal suggestion of a lymph node capsule and subcapsular sinus.  There is no benign breast tissue present.  This most likely represents metastatic tumor  which has completely replaced the lymph node tissue.  Dr. Terri Bassett has reviewed this case and concurs in the diagnosis.        Assessment and Plan     49-year-old female with left breast invasive  ductal carcinoma ER-/HI- HER2+ with regional spread to left axillary lymph node    PLAN:    Imaging and pathology reviewed with patient  PET-CT to evaluate for distant metastasis  Schedule appointments with Hematology Oncology/Radiation Oncology  Referral to genetics  Initial discussions on surgical role in breast cancer including different types of procedures including lumpectomy plus XRT versus mastectomy sentinel lymph node biopsy versus axillary dissections as well as options for reconstruction.  We discussed completing metastatic workup and based on her hormone receptor status along with spread to axillary lymph node she would likely need neoadjuvant therapy prior to surgery.  We also touched upon adjuvant therapies including XRT, chemotherapy, hormone therapy  Will call to schedule Port placement following her completed evaluation with Hematology Oncology   Preop: CBC, BMP; EKG  Risks and benefits discussed with patient including: Pain, bleeding, infection, injury to vessels, pneumothorax, need for further procedure     60 minutes of total time spent on the encounter, which includes face to face time and non-face to face time preparing to see the patient (eg, review of tests), Obtaining and/or reviewing separately obtained history, Documenting clinical information in the electronic or other health record, Independently interpreting results (not separately reported) and communicating results to the patient/family/caregiver, or Care coordination (not separately reported).                [1]   Current Outpatient Medications   Medication Sig Dispense Refill    lisinopril 10 MG tablet Take 10 mg by mouth.      sertraline (ZOLOFT) 25 MG tablet Take 25 mg by mouth once daily.       No current facility-administered medications for this visit.   [2]   Social History  Tobacco Use    Smoking status: Never    Smokeless tobacco: Never   Substance Use Topics    Alcohol use: No    Drug use: No

## 2025-02-19 NOTE — PROGRESS NOTES
Patient ID: Serina Barriga   Reason for Visit: Establish Care  MRN:  [id number]     Oncologic Diagnosis:  L Breast Invasive Ductal Carcinoma with Micropapillary Features (cT2,cN1,Mx),  G2, ki67 70-80%, -/-/+  Previous Treatment:  N/A  Current Treatment:  Pending NA Systemic Therapy  Subjective   Serina Barriga is a 49 y.o. female with depreesion, HTN, and utrine fibroids who presents to clinic to establish care on referral for recently diagnosed breast cancer and is accompanied by her .     Understandably,  is extremely anxious about her diagnosis and pending PET-CT results.  She saw her PCP today and was noted to be jaundiced.  Chemistry panel was obtained which the patient showed results on her phone to me.  She has a significant transaminitis with elevated AST/ALT and bilirubin.  Of course the discussion was had with her primary care doctor that this could represent metastatic breast cancer to the liver.  I discussed with her further and she started a statin approximately 3-4 weeks ago for hyperlipidemia.  She has also been taking copious amounts of Tylenol for right-sided abdominal pain that radiates to her shoulder.  I discussed with her that while certainly this could be metastatic breast cancer, and that I can make no guarantees that this is not, that with the timing of her recent medication ingestion this most likely represents medication induced transaminitis.  The PET-CT we will wait list further.  I assured her that even if this turns out to be stage IV disease she will still be a candidate for systemic therapy.  While not curable, systemic therapy may control her cancer is stage IV.  I encouraged her to not think worse case scenario at this time but to try to just take it 1 day at a time.    We spent the majority of our visit addressing her anxiety.  I encouraged her to stop gurgling, reading various sources of information, and contemplating various outcomes until  she knows exactly what she is dealing with and we give her and exact stage as well as treatment plan.  She agrees.  Additionally she has not been sleeping because she has been very anxious.  She started diazepam with some relief.  She will wait to hear about her PET-CT results.  I reviewed with her and her  the typical treatment for non stage IV HER2 positive breast cancer which includes neoadjuvant systemic therapy.  I briefly reviewed with the treatments for stage IV breast cancer which would be chemotherapy with likely progression at some point on a regimen requiring change of chemotherapy.  They expressed understanding.  I told him that trying to give exact information on multiple scenarios would not be beneficial to her at this time as she only has 24 hours before she receives her PET-CT results.  Myself or Dr. Doss or his staff will be in touch with her regarding her results.      Review of Systems   Constitutional:  Negative for activity change, appetite change, chills, diaphoresis, fatigue, fever and unexpected weight change.   Respiratory:  Negative for shortness of breath.    Cardiovascular:  Negative for chest pain.   Gastrointestinal:  Positive for abdominal pain. Negative for abdominal distention, anal bleeding, blood in stool, constipation, diarrhea, nausea and vomiting.   Genitourinary:  Negative for difficulty urinating and hematuria.   Musculoskeletal:  Negative for arthralgias, back pain and myalgias.   Skin:  Negative for rash.   Neurological:  Negative for dizziness, weakness, light-headedness and headaches.   Hematological:  Does not bruise/bleed easily.   Psychiatric/Behavioral:  The patient is not nervous/anxious.      History     Oncology History    No history exists.         Past Medical History:   Diagnosis Date    Depression     controlled with meds    Fibroid December 2, 2022    Hypertension     It's controlled with meds.       Past Surgical History:   Procedure Laterality Date     "FUNCTIONAL ENDOSCOPIC SINUS SURGERY (FESS) USING COMPUTER-ASSISTED NAVIGATION  02/2022       Family History   Problem Relation Name Age of Onset    Breast cancer Mother Maria Angelo 63        triple negative breast cancer  masectomy    Hypertension Mother Maria Angelo     Prostate cancer Father Rikki Angelo 70        prostectomy    Thyroid disease Maternal Aunt Sherry Lazarus     Lung cancer Maternal Grandmother          smoker    Skin cancer Paternal Grandfather          face    Other (HPV) Daughter  23    Breast cancer Other Fifi 60 - 69    Ovarian cancer Neg Hx      Colon cancer Neg Hx         Review of patient's allergies indicates:   Allergen Reactions    Codeine Rash       Social History[1]    Physical Exam   ECOG:   ECOG SCORE    0 - Fully active-able to carry on all pre-disease performance without restriction          Vitals:  /86   Pulse (!) 115   Temp 98.1 °F (36.7 °C) (Temporal)   Resp 18   Ht 5' 2" (1.575 m)   Wt 69.5 kg (153 lb 3.5 oz)   LMP 02/04/2025   SpO2 98%   BMI 28.02 kg/m²       Physical Exam  Constitutional:       General: She is not in acute distress.     Appearance: Normal appearance. She is not ill-appearing.   HENT:      Head: Normocephalic and atraumatic.   Eyes:      General: Scleral icterus present.      Extraocular Movements: Extraocular movements intact.      Conjunctiva/sclera: Conjunctivae normal.   Cardiovascular:      Rate and Rhythm: Normal rate.   Pulmonary:      Effort: Pulmonary effort is normal. No respiratory distress.      Breath sounds: Normal breath sounds.   Abdominal:      Palpations: There is no hepatomegaly or splenomegaly.   Musculoskeletal:         General: Normal range of motion.      Right lower leg: No edema.      Left lower leg: No edema.   Skin:     Coloration: Skin is jaundiced.      Findings: No rash.   Neurological:      General: No focal deficit present.      Mental Status: She is alert and oriented to person, place, and time. "   Psychiatric:         Mood and Affect: Mood normal.         Behavior: Behavior normal.         Thought Content: Thought content normal.        Labs   Labs:  No visits with results within 2 Day(s) from this visit.   Latest known visit with results is:   Hospital Outpatient Visit on 02/11/2025   Component Date Value Ref Range Status    Final Pathologic Diagnosis 02/11/2025    Final                    Value:1. LEFT BREAST, 11:00 O'CLOCK, NEEDLE CORE BIOPSIES:    -  Invasive ductal carcinoma, [place] histologic grade 2, with focal micropapillary features.          Glandular (acinar)/tubular differentiation:  Score 3.            Nuclear pleomorphism: Score 2.            Mitotic rate:  Score 2.            Overall grade: Grade 2 (score 7).          Size of invasive carcinoma as measured from the H&E slides: 5 mm.    BREAST BIOMARKER TESTING  RESULTS:   Estrogen receptor (ER) status: Negative.  Progesterone receptor (PgR) status: Negative.  HER2 by IHC:  Positive (score 3+).    Ki-67 proliferation index:  50-60%.    2. LEFT BREAST, 12:00 O'CLOCK, NEEDLE CORE BIOPSIES:  -  Invasive ductal carcinoma, [place] histologic grade 2.            Glandular (acinar)/tubular differentiation:  Score 3.            Nuclear pleomorphism: Score 2.            Mitotic rate: Score 2.            Overall grade:  Grade 2 (score 7).            Size of invasive carcinoma as measured from the H&E slides:  10 mm.                              BREAST BIOMARKER TESTING RESULTS:  Estrogen receptor (ER) status: Negative (less than 1% of tumor cells demonstrate weak nuclear staining).    Progesterone receptor (PgR):  Negative.    HER2 by IHC:  Positive (score 3+).  Ki-67 proliferation rate:  70-80%.    3. LEFT AXILLA, NEEDLE CORE BIOPSIES:  -  Invasive ductal carcinoma of the breast, [place] histologic grade 2, most consistent with a metastasis to an axillary lymph node (see comment).    COMMENT:  Definitive lymph node tissue is not identified in  the left axilla biopsies, but there is a focal suggestion of a lymph node capsule and subcapsular sinus.  There is no benign breast tissue present.  This most likely represents metastatic tumor   which has completely replaced the lymph node tissue.  Dr. Terri Bassett has reviewed this case and concurs in the diagnosis.      Interp By Ladonna Michaud MD, Signed on 02/14/2025 at 15:24    Gross 02/11/2025    Final                    Value:Three Part Case:    Part 1  Pathology MRN# [id number]  Hospital/Clinic label MRN# [id number]    Received in formalin labeled with the patient's name, MRN, and &quot;left breast 11:00&quot; are 6 white-yellow fibrofatty soft tissue pieces ranging from 0.3 x 0.1 cm to 1.5 x 0.2 cm.  The specimen is submitted in toto in cassettes:  FCS--1-A  TPM--1-B    A gross photograph is taken.  Time collected:  Not recorded  Time in formalin: 1037  Ischemic time:  Not recorded    Part 2  Pathology MRN# [id number]  Hospital/Clinic label MRN# [id number]    Received in formalin labeled with the patient's name, MRN, and &quot;left breast 12:00&quot; are 3 white-yellow fibrofatty soft tissue pieces ranging from 0.3 x 0.2 cm to 1.1 x 0.2 cm.  The specimen is submitted in toto in cassette:  GGQ--2-A    A gross photograph is taken.  Time collected:  Not recorded  Time in formalin: 1058  Ischemic time:  Not recorded    Part 3  Pathology MRN# [id number]  Hospital/Clinic label MRN# [id number]    Received in for                          urban labeled with the patient's name, MRN, and &quot;left axilla&quot; are 4 white-yellow fibrofatty soft tissue cores ranging from 0.9 x 0.1 cm to 1.5 x 0.1 cm.  The specimen is submitted in toto in cassettes:  GVR--3-A  EIM--3-B    A gross photograph is taken.  Time collected:  Not recorded  Time in formalin: 1118  Ischemic time:  Not recorded    SERA Domingo, PA (ASCP)CM      Microscopic Exam 02/11/2025    Final                    Value:1. The left  breast needle core biopsies from 11:00 o'clock are examined at multiple levels.  There is an invasive carcinoma present composed of a proliferation of medium-sized to large epithelial cells which demonstrate a marked increase in nuclear to   cytoplasmic ratios and have hyperchromatic to slightly vesicular nuclei.  Some cells have large nucleoli.  There is moderate-to-marked cellular pleomorphism and scattered mitotic figures with up to 5 mitoses / 10 HPF.  The tumor cells irregularly   infiltrate the surrounding stroma as nests and trabeculae of cells.  Some of the nests of cells demonstrate tissue retraction spaces and have a slight micropapillary appearance.  The surrounding stroma is desmoplastic and there are scattered chronic   inflammatory cells.  In some areas, the tumor cells are markedly attenuated with a surrounding markedly desmoplastic stroma.  This is interpreted as an invasive ductal carcinoma, [place] histologic grade 2 (3, 2, 2).  The tumor measures 5 mm                           in   greatest dimensions.  There is no evidence of DCIS.  Immunostains for breast biomarkers and p63 are examined to further evaluate the tumor; the positive and negative IHC tissue controls stained appropriately.  The results of the immunostains on the tumor   cells are as follows:    ER:  Negative.  HER2:  Positive (score 3+).  Ki-67:  50-60%.  p63:  Negative for myoepithelial cells around all areas of invasive carcinoma with no evidence of DCIS.  Focal tumor cells demonstrate mild-to-moderate nuclear positivity.  HI:  Negative.    2. The left breast needle core biopsies from 12:00 o'clock are examined at multiple levels.  There is an invasive carcinoma which essentially has the same histopathologic features as the invasive tumor in specimen 1.  However, in these biopsies the tumor   cells are more attenuated with surrounding desmoplastic stroma and the tumor infiltrates as anastomosing cords and trabeculae of cells.   No micropapillary features are identified.  The tumor measures 10                           mm in greatest dimension.  There is no evidence of   DCIS.  Immunostains for breast biomarkers and p63 are examined to further evaluate the tumor; the positive and negative IHC tissue controls stained appropriately.  The results of the immunostains are as follows:    ER:  Negative; less than 1% of tumor cells demonstrate weak nuclear staining.  HER2:  Positive (score 3+).  Ki-67:  70-80%.  p63:  Negative for myoepithelial cells around the invasive carcinoma.  Focal nests of tumor cells demonstrate a partial lining of myoepithelial cells; however, this is not interpreted as definitive DCIS.  IA:  Negative.    3. The left axillary needle core biopsies are examined at multiple levels.  Focally, there is a suggestion of a lymph node capsule and subcapsular sinus; however, this cannot be determined with certainty.  No benign breast tissue is identified.  The   tissue demonstrates a proliferation of malignant epithelial cells which in some areas are cuboidal in shape and demonstrate the same                           histopathologic features as described in specimen 1.  However, in many areas, the surrounding stroma is markedly   desmoplastic and many of the cells become spindle-shaped and attenuated.  In these areas the tumor cells create a somewhat storiform appearance.  This is interpreted as metastatic ductal carcinoma of the breast; the tumor measures 10 mm in greatest   dimension.  Immunostains for AE1/AE3 and GATA3 are examined on blocks 3A and 3B as a precautionary measure; the positive and negative IHC tissue controls stained appropriately.  The immunostains for AE1/AE3 demonstrates strong cytoplasmic staining in the   tumor cells.  The immunostains for GATA3 demonstrate strong nuclear staining in the tumor cells supporting the diagnosis.        Disclaimer 02/11/2025    Final                    Value:Unless the case is a  'gross only' or additional testing only, the final diagnosis for each specimen is based on a microscopic examination of appropriate tissue sections.  ER immunohistochemical staining (clone SP1, DAB detection method) is performed on formalin-fixed, paraffin embedded tissue sections. The percentage of cell nuclei stained and the strength of staining is reported (weak, moderate, strong), using the 2010   ACSO/CAP scoring guidelines. Tumors used for determining predictive markers are fixed in 10% neutral buffered formalin for 6-72 hours. It has been cleared by the [place] FDA for use as an IVD test. This assay has not been validated on decalcified tissues.   Results should be interpreted with caution given the likelihood of false negativity on decalcified specimens.  HER-2/angely IHC (4B5) clone, DAB detection method) is done on 10% buffered formalin-fixed (for 6-72 hrs), paraffin embedded tissue sections. The scoring is completed on a 4-tiered scoring system of membrane sta                          ining using the 2014 ASCO/CAP scoring   guidelines. It has been cleared by the [place] FDA for use as an IVD test. This assay has not been validated on decalcified tissues. Results should be interpreted with caution given the likelihood of false negativity on decalcified specimens.    PgR immunohistochemical staining (clone 1E2, DAB detection method) is performed on formalin-fixed, paraffin embedded tissue sections. The percentage of cell nuclei stained and the strength of staining is report (weak, moderate, strong), using 2010   ASCO/CAP scoring guidelines. Tumors used for determining predictive markers are fixed in 10% neutral buffered formalin for 6-72 hours. It has been cleared by the [place] FDA for use as an IVD test. This assay has not been validated on decalcified tissues.   Results should be interpreted with caution given the likelihood of false negativity on decalcified specimens.        Pathology   Specimen to Pathology,  Radiology Breast, needle biopsy - 02/11/2025      Component  Ref Range & Units (hover) 8 d ago   Final Pathologic Diagnosis 1. LEFT BREAST, 11:00 O'CLOCK, NEEDLE CORE BIOPSIES:    -  Invasive ductal carcinoma, [place] histologic grade 2, with focal micropapillary features.          Glandular (acinar)/tubular differentiation:  Score 3.            Nuclear pleomorphism: Score 2.            Mitotic rate:  Score 2.            Overall grade: Grade 2 (score 7).          Size of invasive carcinoma as measured from the H&E slides: 5 mm.    BREAST BIOMARKER TESTING  RESULTS:  Estrogen receptor (ER) status: Negative.  Progesterone receptor (PgR) status: Negative.  HER2 by IHC:  Positive (score 3+).    Ki-67 proliferation index:  50-60%.    2. LEFT BREAST, 12:00 O'CLOCK, NEEDLE CORE BIOPSIES:  -  Invasive ductal carcinoma, [place] histologic grade 2.            Glandular (acinar)/tubular differentiation:  Score 3.            Nuclear pleomorphism: Score 2.            Mitotic rate: Score 2.            Overall grade:  Grade 2 (score 7).            Size of invasive carcinoma as measured from the H&E slides:  10 mm.    BREAST BIOMARKER TESTING RESULTS:  Estrogen receptor (ER) status: Negative (less than 1% of tumor cells demonstrate weak nuclear staining).    Progesterone receptor (PgR):  Negative.    HER2 by IHC:  Positive (score 3+).  Ki-67 proliferation rate:  70-80%.    3. LEFT AXILLA, NEEDLE CORE BIOPSIES:  -  Invasive ductal carcinoma of the breast, [place] histologic grade 2, most consistent with a metastasis to an axillary lymph node (see comment).    COMMENT:  Definitive lymph node tissue is not identified in the left axilla biopsies, but there is a focal suggestion of a lymph node capsule and subcapsular sinus.  There is no benign breast tissue present.  This most likely represents metastatic tumor  which has completely replaced the lymph node tissue.  Dr. Terri Bassett has reviewed this case and concurs in the  diagnosis.   Comment: Interp By Ladonna Michaud MD, Signed on 02/14/2025 at 15:24   Gross Three Part Case:    Part 1  Pathology MRN# [id number]  Hospital/Clinic label MRN# [id number]    Received in formalin labeled with the patient's name, MRN, and &quot;left breast 11:00&quot; are 6 white-yellow fibrofatty soft tissue pieces ranging from 0.3 x 0.1 cm to 1.5 x 0.2 cm.  The specimen is submitted in toto in cassettes:  IRR--1-A  PXG--1-B    A gross photograph is taken.  Time collected:  Not recorded  Time in formalin: 1037  Ischemic time:  Not recorded    Part 2  Pathology MRN# [id number]  Hospital/Clinic label MRN# [id number]    Received in formalin labeled with the patient's name, MRN, and &quot;left breast 12:00&quot; are 3 white-yellow fibrofatty soft tissue pieces ranging from 0.3 x 0.2 cm to 1.1 x 0.2 cm.  The specimen is submitted in toto in cassette:  XTI--2-A    A gross photograph is taken.  Time collected:  Not recorded  Time in formalin: 1058  Ischemic time:  Not recorded    Part 3  Pathology MRN# [id number]  Hospital/Clinic label MRN# [id number]    Received in formalin labeled with the patient's name, MRN, and &quot;left axilla&quot; are 4 white-yellow fibrofatty soft tissue cores ranging from 0.9 x 0.1 cm to 1.5 x 0.1 cm.  The specimen is submitted in toto in cassettes:  OTM--3-A  HIL--3-B    A gross photograph is taken.  Time collected:  Not recorded  Time in formalin: 1118  Ischemic time:  Not recorded    SERA Domingo PA ([facility])CM   Microscopic Exam 1. The left breast needle core biopsies from 11:00 o'clock are examined at multiple levels.  There is an invasive carcinoma present composed of a proliferation of medium-sized to large epithelial cells which demonstrate a marked increase in nuclear to  cytoplasmic ratios and have hyperchromatic to slightly vesicular nuclei.  Some cells have large nucleoli.  There is moderate-to-marked cellular pleomorphism and scattered mitotic figures  with up to 5 mitoses / 10 HPF.  The tumor cells irregularly  infiltrate the surrounding stroma as nests and trabeculae of cells.  Some of the nests of cells demonstrate tissue retraction spaces and have a slight micropapillary appearance.  The surrounding stroma is desmoplastic and there are scattered chronic  inflammatory cells.  In some areas, the tumor cells are markedly attenuated with a surrounding markedly desmoplastic stroma.  This is interpreted as an invasive ductal carcinoma, [place] histologic grade 2 (3, 2, 2).  The tumor measures 5 mm in  greatest dimensions.  There is no evidence of DCIS.  Immunostains for breast biomarkers and p63 are examined to further evaluate the tumor; the positive and negative IHC tissue controls stained appropriately.  The results of the immunostains on the tumor   cells are as follows:    ER:  Negative.  HER2:  Positive (score 3+).  Ki-67:  50-60%.  p63:  Negative for myoepithelial cells around all areas of invasive carcinoma with no evidence of DCIS.  Focal tumor cells demonstrate mild-to-moderate nuclear positivity.  WI:  Negative.    2. The left breast needle core biopsies from 12:00 o'clock are examined at multiple levels.  There is an invasive carcinoma which essentially has the same histopathologic features as the invasive tumor in specimen 1.  However, in these biopsies the tumor   cells are more attenuated with surrounding desmoplastic stroma and the tumor infiltrates as anastomosing cords and trabeculae of cells.  No micropapillary features are identified.  The tumor measures 10 mm in greatest dimension.  There is no evidence of   DCIS.  Immunostains for breast biomarkers and p63 are examined to further evaluate the tumor; the positive and negative IHC tissue controls stained appropriately.  The results of the immunostains are as follows:    ER:  Negative; less than 1% of tumor cells demonstrate weak nuclear staining.  HER2:  Positive (score 3+).  Ki-67:   70-80%.  p63:  Negative for myoepithelial cells around the invasive carcinoma.  Focal nests of tumor cells demonstrate a partial lining of myoepithelial cells; however, this is not interpreted as definitive DCIS.  NC:  Negative.    3. The left axillary needle core biopsies are examined at multiple levels.  Focally, there is a suggestion of a lymph node capsule and subcapsular sinus; however, this cannot be determined with certainty.  No benign breast tissue is identified.  The  tissue demonstrates a proliferation of malignant epithelial cells which in some areas are cuboidal in shape and demonstrate the same histopathologic features as described in specimen 1.  However, in many areas, the surrounding stroma is markedly  desmoplastic and many of the cells become spindle-shaped and attenuated.  In these areas the tumor cells create a somewhat storiform appearance.  This is interpreted as metastatic ductal carcinoma of the breast; the tumor measures 10 mm in greatest  dimension.  Immunostains for AE1/AE3 and GATA3 are examined on blocks 3A and 3B as a precautionary measure; the positive and negative IHC tissue controls stained appropriately.  The immunostains for AE1/AE3 demonstrates strong cytoplasmic staining in the   tumor cells.  The immunostains for GATA3           Imaging   PET-CT pending      Assessment and Plan   L Breast Invasive Ductal Carcinoma with Micropapillary Features (cT2,cN1,Mx),  G2, ki67 70-80%, -/-/+  PET-CT ordered to assess for distant metastasis -scheduled for 02/21/2025  I discussed in detail with the pt the role of medical oncology in her care.  I informed her that final treatment recommendations is based on the NCCN guidelines and given the size, hormonal makeup and pathologically proven lymph node involvement of her tumor, NA systemic therapy is recommended as NA systemic therapy is preferred for HER2-positive disease if >=cT2 or >=cN1 to downstage tumor prior to resection.  If she has  metastatic chemotherapy will still be indicated however the goal of treatment would be palliative instead of curative intent.  Hopefully her PET-CT shows no distant metastatic disease and she can begin treatment with neoadjuvant TCH be   She has already been referred to the genetic counselor for germline testing and NGS is pending on her biopsy specimen      Transaminitis  The patient has started a statin approximately 3-4 weeks ago and has been taking copious amounts of Tylenol for post biopsy pain   I discussed with her that while it is not guarantee this represents metastatic disease, the timing of her transaminitis is likely more in relation to medication side effect   She has discontinued the statin; counseled her to not take any medications with Tylenol in it  We will await PET-CT results prior to finalizing any determination      Anxiety   I discussed with her her ileus related anxiety and counseled her take it 1 step at a time; she has already been prescribed diazepam which is helping her to sleep.  I think she will have much more relief once she has a definitive plan and received PET-CT results   Continue Diazepam RI  Also informed her that we do have counselors as well as a psychiatrist who specializes in counseling of patients diagnosed with cancer      Cancer Screening  PAP Smear 11/29/2022: Satisfactory for interpretation. Endocervical component is absent. Negative for intraepithelial lesion or malignancy.  Colonoscopy: Due      Chronic Medical Conditions  Depression   HTN  Urine fibroids        Med Onc Chart Routing      Follow up with physician 2 weeks.   Follow up with PIPPA    Infusion scheduling note   Cycle 1 TCH P in 2 weeks   Injection scheduling note    Labs CBC, CMP, magnesium and phosphorus   Scheduling:  Preferred lab:  Lab interval:     Imaging    Pharmacy appointment    Other referrals                      The patient was seen, interviewed and examined. Pertinent lab and radiologic studies  were reviewed. Pt instructed to call should they develop concerning signs/symptoms or have further questions.        Portions of the record may have been created with voice recognition software. Occasional wrong-word or sound-a-like substitutions may have occurred due to the inherent limitations of voice recognition software. Read the chart carefully and recognize, using context, where substitutions have occurred.      Niyah Esquivel MD    Hematology/Oncology              [1]   Social History  Tobacco Use    Smoking status: Never    Smokeless tobacco: Never   Substance Use Topics    Alcohol use: No    Drug use: No

## 2025-02-20 ENCOUNTER — OFFICE VISIT (OUTPATIENT)
Facility: CLINIC | Age: 50
End: 2025-02-20
Payer: COMMERCIAL

## 2025-02-20 ENCOUNTER — PATIENT MESSAGE (OUTPATIENT)
Facility: CLINIC | Age: 50
End: 2025-02-20

## 2025-02-20 ENCOUNTER — OFFICE VISIT (OUTPATIENT)
Dept: HEMATOLOGY/ONCOLOGY | Facility: CLINIC | Age: 50
End: 2025-02-20
Payer: COMMERCIAL

## 2025-02-20 VITALS
HEIGHT: 62 IN | OXYGEN SATURATION: 98 % | HEART RATE: 115 BPM | WEIGHT: 153.25 LBS | SYSTOLIC BLOOD PRESSURE: 133 MMHG | DIASTOLIC BLOOD PRESSURE: 86 MMHG | BODY MASS INDEX: 28.2 KG/M2 | TEMPERATURE: 98 F | RESPIRATION RATE: 18 BRPM

## 2025-02-20 DIAGNOSIS — C50.812 MALIGNANT NEOPLASM OF OVERLAPPING SITES OF LEFT BREAST IN FEMALE, ESTROGEN RECEPTOR NEGATIVE: ICD-10-CM

## 2025-02-20 DIAGNOSIS — Z80.3 FAMILY HISTORY OF BREAST CANCER: ICD-10-CM

## 2025-02-20 DIAGNOSIS — R74.01 TRANSAMINITIS: ICD-10-CM

## 2025-02-20 DIAGNOSIS — F41.9 ANXIETY: Primary | ICD-10-CM

## 2025-02-20 DIAGNOSIS — Z17.1 MALIGNANT NEOPLASM OF OVERLAPPING SITES OF LEFT BREAST IN FEMALE, ESTROGEN RECEPTOR NEGATIVE: ICD-10-CM

## 2025-02-20 DIAGNOSIS — Z71.83 ENCOUNTER FOR NONPROCREATIVE GENETIC COUNSELING: Primary | ICD-10-CM

## 2025-02-20 RX ORDER — ROSUVASTATIN CALCIUM 5 MG/1
5 TABLET, COATED ORAL NIGHTLY
COMMUNITY
Start: 2025-01-28 | End: 2025-07-27

## 2025-02-20 RX ORDER — DIAZEPAM 5 MG/1
5 TABLET ORAL EVERY 12 HOURS PRN
COMMUNITY
Start: 2025-02-19

## 2025-02-20 RX ORDER — SERTRALINE HYDROCHLORIDE 50 MG/1
50 TABLET, FILM COATED ORAL DAILY
COMMUNITY

## 2025-02-21 ENCOUNTER — HOSPITAL ENCOUNTER (OUTPATIENT)
Dept: RADIOLOGY | Facility: HOSPITAL | Age: 50
Discharge: HOME | End: 2025-02-21
Attending: SURGERY
Payer: COMMERCIAL

## 2025-02-21 ENCOUNTER — TELEPHONE (OUTPATIENT)
Dept: HEMATOLOGY/ONCOLOGY | Facility: CLINIC | Age: 50
End: 2025-02-21
Payer: COMMERCIAL

## 2025-02-21 DIAGNOSIS — Z17.1 MALIGNANT NEOPLASM OF OVERLAPPING SITES OF LEFT BREAST IN FEMALE, ESTROGEN RECEPTOR NEGATIVE: ICD-10-CM

## 2025-02-21 DIAGNOSIS — C50.812 MALIGNANT NEOPLASM OF OVERLAPPING SITES OF LEFT BREAST IN FEMALE, ESTROGEN RECEPTOR NEGATIVE: Primary | ICD-10-CM

## 2025-02-21 DIAGNOSIS — C50.812 MALIGNANT NEOPLASM OF OVERLAPPING SITES OF LEFT BREAST IN FEMALE, ESTROGEN RECEPTOR NEGATIVE: ICD-10-CM

## 2025-02-21 DIAGNOSIS — Z17.1 MALIGNANT NEOPLASM OF OVERLAPPING SITES OF LEFT BREAST IN FEMALE, ESTROGEN RECEPTOR NEGATIVE: Primary | ICD-10-CM

## 2025-02-21 PROBLEM — F41.9 ANXIETY: Status: ACTIVE | Noted: 2025-02-21

## 2025-02-21 PROBLEM — R74.01 TRANSAMINITIS: Status: ACTIVE | Noted: 2025-02-21

## 2025-02-21 PROCEDURE — A9552 F18 FDG: HCPCS | Performed by: SURGERY

## 2025-02-21 PROCEDURE — 78815 PET IMAGE W/CT SKULL-THIGH: CPT | Mod: TC

## 2025-02-21 PROCEDURE — 78815 PET IMAGE W/CT SKULL-THIGH: CPT | Mod: 26,PI,, | Performed by: RADIOLOGY

## 2025-02-21 RX ORDER — FLUDEOXYGLUCOSE F18 500 MCI/ML
11.96 INJECTION INTRAVENOUS
Status: COMPLETED | OUTPATIENT
Start: 2025-02-21 | End: 2025-02-21

## 2025-02-21 RX ORDER — TRAMADOL HYDROCHLORIDE 50 MG/1
50 TABLET ORAL EVERY 12 HOURS PRN
Qty: 30 TABLET | Refills: 0 | Status: SHIPPED | OUTPATIENT
Start: 2025-02-21

## 2025-02-21 RX ADMIN — FLUDEOXYGLUCOSE F-18 11.96 MILLICURIE: 500 INJECTION INTRAVENOUS at 10:02

## 2025-02-21 NOTE — TELEPHONE ENCOUNTER
PRINCESS called patient to address distress score of 9. PRINCESS introduced herself to patient and explained her role once she answered the phone. Pt states she has been busy with several appointments. She denies any needs at this time. Pt states she will send an epic message to PRINCESS if she needs anything in the future. SWER will remain available.

## 2025-02-24 ENCOUNTER — HOSPITAL ENCOUNTER (INPATIENT)
Facility: HOSPITAL | Age: 50
LOS: 2 days | Discharge: HOME | DRG: 829 | End: 2025-02-26
Attending: EMERGENCY MEDICINE | Admitting: INTERNAL MEDICINE
Payer: COMMERCIAL

## 2025-02-24 DIAGNOSIS — C78.7 METASTASIS TO LIVER: Primary | ICD-10-CM

## 2025-02-24 DIAGNOSIS — Z95.828 S/P PICC CENTRAL LINE PLACEMENT: ICD-10-CM

## 2025-02-24 DIAGNOSIS — R74.01 TRANSAMINITIS: ICD-10-CM

## 2025-02-24 DIAGNOSIS — D49.3 NEOPLASM OF LEFT BREAST: ICD-10-CM

## 2025-02-24 DIAGNOSIS — E80.6 HYPERBILIRUBINEMIA: ICD-10-CM

## 2025-02-24 DIAGNOSIS — C50.812 MALIGNANT NEOPLASM OF OVERLAPPING SITES OF LEFT BREAST IN FEMALE, ESTROGEN RECEPTOR NEGATIVE: ICD-10-CM

## 2025-02-24 DIAGNOSIS — Z17.1 MALIGNANT NEOPLASM OF OVERLAPPING SITES OF LEFT BREAST IN FEMALE, ESTROGEN RECEPTOR NEGATIVE: ICD-10-CM

## 2025-02-24 LAB
ALBUMIN SERPL BCP-MCNC: 2.4 G/DL (ref 3.5–5.2)
ALP SERPL-CCNC: 697 U/L (ref 40–150)
ALT SERPL W/O P-5'-P-CCNC: 100 U/L (ref 10–44)
ANION GAP SERPL CALC-SCNC: 12 MMOL/L (ref 8–16)
APAP SERPL-MCNC: 3 UG/ML (ref 10–20)
AST SERPL-CCNC: 397 U/L (ref 10–40)
BACTERIA #/AREA URNS HPF: ABNORMAL /HPF
BASOPHILS # BLD AUTO: 0.11 K/UL (ref 0–0.2)
BASOPHILS NFR BLD: 0.9 % (ref 0–1.9)
BILIRUB DIRECT SERPL-MCNC: >14 MG/DL (ref 0.1–0.3)
BILIRUB SERPL-MCNC: 22.4 MG/DL (ref 0.1–1)
BILIRUB UR QL STRIP: ABNORMAL
BUN SERPL-MCNC: 16 MG/DL (ref 6–20)
CALCIUM SERPL-MCNC: 8.7 MG/DL (ref 8.7–10.5)
CHLORIDE SERPL-SCNC: 105 MMOL/L (ref 95–110)
CLARITY UR: ABNORMAL
CO2 SERPL-SCNC: 20 MMOL/L (ref 23–29)
COLOR UR: YELLOW
CREAT SERPL-MCNC: 0.8 MG/DL (ref 0.5–1.4)
DIFFERENTIAL METHOD BLD: ABNORMAL
EOSINOPHIL # BLD AUTO: 0.1 K/UL (ref 0–0.5)
EOSINOPHIL NFR BLD: 0.9 % (ref 0–8)
ERYTHROCYTE [DISTWIDTH] IN BLOOD BY AUTOMATED COUNT: 21.1 % (ref 11.5–14.5)
EST. GFR  (NO RACE VARIABLE): >60 ML/MIN/1.73 M^2
GLUCOSE SERPL-MCNC: 92 MG/DL (ref 70–110)
GLUCOSE UR QL STRIP: NEGATIVE
HCT VFR BLD AUTO: 43.6 % (ref 37–48.5)
HCV AB SERPL QL IA: NEGATIVE
HEP C VIRUS HOLD SPECIMEN: NORMAL
HGB BLD-MCNC: 14.6 G/DL (ref 12–16)
HGB UR QL STRIP: NEGATIVE
HIV 1+2 AB+HIV1 P24 AG SERPL QL IA: NEGATIVE
HYALINE CASTS #/AREA URNS LPF: 3 /LPF
IMM GRANULOCYTES # BLD AUTO: 0.07 K/UL (ref 0–0.04)
IMM GRANULOCYTES NFR BLD AUTO: 0.6 % (ref 0–0.5)
INR PPP: 1.3 (ref 0.8–1.2)
KETONES UR QL STRIP: NEGATIVE
LEUKOCYTE ESTERASE UR QL STRIP: ABNORMAL
LIPASE SERPL-CCNC: 39 U/L (ref 4–60)
LYMPHOCYTES # BLD AUTO: 2 K/UL (ref 1–4.8)
LYMPHOCYTES NFR BLD: 17.6 % (ref 18–48)
MAGNESIUM SERPL-MCNC: 1.9 MG/DL (ref 1.6–2.6)
MCH RBC QN AUTO: 26.9 PG (ref 27–31)
MCHC RBC AUTO-ENTMCNC: 33.5 G/DL (ref 32–36)
MCV RBC AUTO: 80 FL (ref 82–98)
MICROSCOPIC COMMENT: ABNORMAL
MONOCYTES # BLD AUTO: 1.4 K/UL (ref 0.3–1)
MONOCYTES NFR BLD: 11.7 % (ref 4–15)
NEUTROPHILS # BLD AUTO: 7.9 K/UL (ref 1.8–7.7)
NEUTROPHILS NFR BLD: 68.3 % (ref 38–73)
NITRITE UR QL STRIP: NEGATIVE
NRBC BLD-RTO: 0 /100 WBC
PH UR STRIP: 6 [PH] (ref 5–8)
PLATELET # BLD AUTO: 409 K/UL (ref 150–450)
PMV BLD AUTO: 9.9 FL (ref 9.2–12.9)
POTASSIUM SERPL-SCNC: 3.5 MMOL/L (ref 3.5–5.1)
PROT SERPL-MCNC: 6.5 G/DL (ref 6–8.4)
PROT UR QL STRIP: ABNORMAL
PROTHROMBIN TIME: 14.7 SEC (ref 9–12.5)
RBC # BLD AUTO: 5.42 M/UL (ref 4–5.4)
RBC #/AREA URNS HPF: 2 /HPF (ref 0–4)
SODIUM SERPL-SCNC: 137 MMOL/L (ref 136–145)
SP GR UR STRIP: 1.02 (ref 1–1.03)
SQUAMOUS #/AREA URNS HPF: 5 /HPF
URN SPEC COLLECT METH UR: ABNORMAL
UROBILINOGEN UR STRIP-ACNC: ABNORMAL EU/DL
WBC # BLD AUTO: 11.61 K/UL (ref 3.9–12.7)
WBC #/AREA URNS HPF: 21 /HPF (ref 0–5)

## 2025-02-24 PROCEDURE — 83690 ASSAY OF LIPASE: CPT | Performed by: EMERGENCY MEDICINE

## 2025-02-24 PROCEDURE — 86803 HEPATITIS C AB TEST: CPT | Performed by: EMERGENCY MEDICINE

## 2025-02-24 PROCEDURE — 25000003 PHARM REV CODE 250: Performed by: EMERGENCY MEDICINE

## 2025-02-24 PROCEDURE — 36573 INSJ PICC RS&I 5 YR+: CPT

## 2025-02-24 PROCEDURE — C1751 CATH, INF, PER/CENT/MIDLINE: HCPCS

## 2025-02-24 PROCEDURE — 25000003 PHARM REV CODE 250: Performed by: NURSE PRACTITIONER

## 2025-02-24 PROCEDURE — 80143 DRUG ASSAY ACETAMINOPHEN: CPT | Performed by: EMERGENCY MEDICINE

## 2025-02-24 PROCEDURE — 82248 BILIRUBIN DIRECT: CPT | Performed by: EMERGENCY MEDICINE

## 2025-02-24 PROCEDURE — 85610 PROTHROMBIN TIME: CPT | Performed by: EMERGENCY MEDICINE

## 2025-02-24 PROCEDURE — 83735 ASSAY OF MAGNESIUM: CPT | Performed by: EMERGENCY MEDICINE

## 2025-02-24 PROCEDURE — 85025 COMPLETE CBC W/AUTO DIFF WBC: CPT | Performed by: EMERGENCY MEDICINE

## 2025-02-24 PROCEDURE — 63600175 PHARM REV CODE 636 W HCPCS: Performed by: INTERNAL MEDICINE

## 2025-02-24 PROCEDURE — 25000003 PHARM REV CODE 250: Performed by: INTERNAL MEDICINE

## 2025-02-24 PROCEDURE — 80053 COMPREHEN METABOLIC PANEL: CPT | Performed by: EMERGENCY MEDICINE

## 2025-02-24 PROCEDURE — 21400001 HC TELEMETRY ROOM

## 2025-02-24 PROCEDURE — 87086 URINE CULTURE/COLONY COUNT: CPT | Performed by: EMERGENCY MEDICINE

## 2025-02-24 PROCEDURE — 11000001 HC ACUTE MED/SURG PRIVATE ROOM

## 2025-02-24 PROCEDURE — 36415 COLL VENOUS BLD VENIPUNCTURE: CPT | Performed by: EMERGENCY MEDICINE

## 2025-02-24 PROCEDURE — 87389 HIV-1 AG W/HIV-1&-2 AB AG IA: CPT | Performed by: EMERGENCY MEDICINE

## 2025-02-24 PROCEDURE — 3E04305 INTRODUCTION OF OTHER ANTINEOPLASTIC INTO CENTRAL VEIN, PERCUTANEOUS APPROACH: ICD-10-PCS | Performed by: INTERNAL MEDICINE

## 2025-02-24 PROCEDURE — 81000 URINALYSIS NONAUTO W/SCOPE: CPT | Performed by: EMERGENCY MEDICINE

## 2025-02-24 PROCEDURE — 99285 EMERGENCY DEPT VISIT HI MDM: CPT | Mod: 25

## 2025-02-24 PROCEDURE — 02HV33Z INSERTION OF INFUSION DEVICE INTO SUPERIOR VENA CAVA, PERCUTANEOUS APPROACH: ICD-10-PCS | Performed by: INTERNAL MEDICINE

## 2025-02-24 RX ORDER — SERTRALINE HYDROCHLORIDE 50 MG/1
50 TABLET, FILM COATED ORAL DAILY
Status: DISCONTINUED | OUTPATIENT
Start: 2025-02-25 | End: 2025-02-26 | Stop reason: HOSPADM

## 2025-02-24 RX ORDER — HEPARIN 100 UNIT/ML
500 SYRINGE INTRAVENOUS
Status: DISCONTINUED | OUTPATIENT
Start: 2025-02-24 | End: 2025-02-26 | Stop reason: HOSPADM

## 2025-02-24 RX ORDER — SODIUM CHLORIDE 9 MG/ML
INJECTION, SOLUTION INTRAVENOUS CONTINUOUS
Status: DISCONTINUED | OUTPATIENT
Start: 2025-02-24 | End: 2025-02-25

## 2025-02-24 RX ORDER — EPINEPHRINE 0.3 MG/.3ML
0.3 INJECTION SUBCUTANEOUS ONCE AS NEEDED
Status: DISCONTINUED | OUTPATIENT
Start: 2025-02-24 | End: 2025-02-26 | Stop reason: HOSPADM

## 2025-02-24 RX ORDER — EPINEPHRINE 0.3 MG/.3ML
0.3 INJECTION SUBCUTANEOUS ONCE AS NEEDED
Status: DISCONTINUED | OUTPATIENT
Start: 2025-02-24 | End: 2025-02-24

## 2025-02-24 RX ORDER — SODIUM CHLORIDE 0.9 % (FLUSH) 0.9 %
10 SYRINGE (ML) INJECTION
Status: DISCONTINUED | OUTPATIENT
Start: 2025-02-24 | End: 2025-02-26 | Stop reason: HOSPADM

## 2025-02-24 RX ORDER — MUPIROCIN 20 MG/G
OINTMENT TOPICAL 2 TIMES DAILY
Status: DISCONTINUED | OUTPATIENT
Start: 2025-02-24 | End: 2025-02-26 | Stop reason: HOSPADM

## 2025-02-24 RX ORDER — TRAMADOL HYDROCHLORIDE 50 MG/1
50 TABLET ORAL EVERY 6 HOURS PRN
Status: DISCONTINUED | OUTPATIENT
Start: 2025-02-24 | End: 2025-02-25

## 2025-02-24 RX ORDER — ONDANSETRON 4 MG/1
8 TABLET, FILM COATED ORAL DAILY
Status: DISCONTINUED | OUTPATIENT
Start: 2025-02-25 | End: 2025-02-26 | Stop reason: HOSPADM

## 2025-02-24 RX ORDER — ONDANSETRON HYDROCHLORIDE 2 MG/ML
4 INJECTION, SOLUTION INTRAVENOUS EVERY 8 HOURS PRN
Status: DISCONTINUED | OUTPATIENT
Start: 2025-02-24 | End: 2025-02-26 | Stop reason: HOSPADM

## 2025-02-24 RX ORDER — DIPHENHYDRAMINE HYDROCHLORIDE 50 MG/ML
50 INJECTION INTRAMUSCULAR; INTRAVENOUS ONCE AS NEEDED
Status: DISCONTINUED | OUTPATIENT
Start: 2025-02-24 | End: 2025-02-26 | Stop reason: HOSPADM

## 2025-02-24 RX ORDER — ENOXAPARIN SODIUM 100 MG/ML
40 INJECTION SUBCUTANEOUS EVERY 24 HOURS
Status: DISCONTINUED | OUTPATIENT
Start: 2025-02-24 | End: 2025-02-24

## 2025-02-24 RX ORDER — DIAZEPAM 5 MG/1
5 TABLET ORAL EVERY 12 HOURS PRN
Status: DISCONTINUED | OUTPATIENT
Start: 2025-02-24 | End: 2025-02-26 | Stop reason: HOSPADM

## 2025-02-24 RX ORDER — LISINOPRIL 10 MG/1
10 TABLET ORAL DAILY
Status: DISCONTINUED | OUTPATIENT
Start: 2025-02-25 | End: 2025-02-26 | Stop reason: HOSPADM

## 2025-02-24 RX ORDER — FAMOTIDINE 10 MG/ML
20 INJECTION INTRAVENOUS ONCE
Status: COMPLETED | OUTPATIENT
Start: 2025-02-24 | End: 2025-02-24

## 2025-02-24 RX ORDER — DIPHENHYDRAMINE HYDROCHLORIDE 50 MG/ML
25 INJECTION INTRAMUSCULAR; INTRAVENOUS ONCE
Status: COMPLETED | OUTPATIENT
Start: 2025-02-24 | End: 2025-02-24

## 2025-02-24 RX ORDER — OLANZAPINE 5 MG/1
5 TABLET ORAL NIGHTLY
Status: DISCONTINUED | OUTPATIENT
Start: 2025-02-24 | End: 2025-02-26 | Stop reason: HOSPADM

## 2025-02-24 RX ADMIN — OLANZAPINE 5 MG: 5 TABLET, FILM COATED ORAL at 09:02

## 2025-02-24 RX ADMIN — DIPHENHYDRAMINE HYDROCHLORIDE 25 MG: 50 INJECTION, SOLUTION INTRAMUSCULAR; INTRAVENOUS at 06:02

## 2025-02-24 RX ADMIN — ONDANSETRON 16 MG: 2 INJECTION INTRAMUSCULAR; INTRAVENOUS at 06:02

## 2025-02-24 RX ADMIN — MUPIROCIN: 20 OINTMENT TOPICAL at 09:02

## 2025-02-24 RX ADMIN — CARBOPLATIN 240 MG: 10 INJECTION, SOLUTION INTRAVENOUS at 08:02

## 2025-02-24 RX ADMIN — DOCETAXEL 60 MG: 20 INJECTION, SOLUTION, CONCENTRATE INTRAVENOUS at 07:02

## 2025-02-24 RX ADMIN — DIAZEPAM 5 MG: 5 TABLET ORAL at 06:02

## 2025-02-24 RX ADMIN — SODIUM CHLORIDE: 9 INJECTION, SOLUTION INTRAVENOUS at 12:02

## 2025-02-24 RX ADMIN — TRAMADOL HYDROCHLORIDE 50 MG: 50 TABLET, COATED ORAL at 06:02

## 2025-02-24 RX ADMIN — FAMOTIDINE 20 MG: 10 INJECTION, SOLUTION INTRAVENOUS at 06:02

## 2025-02-24 NOTE — H&P
[facility] - Emergency Dept.  [facility]  History & Physical    Patient Name: Serina Barriga  MRN: [id number]  Patient Class: IP- Inpatient  Admission Date: 2/24/2025  Attending Physician: Bob Nagel DO   Primary Care Provider: Obed Alford MD         Patient information was obtained from patient, spouse/SO, and ER records.     Subjective:     Principal Problem:Transaminitis    Chief Complaint:   Chief Complaint   Patient presents with    Abdominal Pain     Recent dx with breast cancer and found mets to liver 2 days ago. Pt c/o abdominal pain, and was told to come to ER by PCP for admit.        HPI: Serina Barriga is a 49 year old female who  has a past medical history of Depression, Fibroid, and Hypertension who presented with her  for evaluation of worsening jaundice. She saw Dr. Lopez on 2/19 to discuss tx as she was recently diagnosed with breast cancer. Today she contacted her PCP as she was more jaundiced today and urine is darker. PCP advised them to go to the ER. ED workup showed WBC count 11.61, Hgb/Hct 14.6/43.6, T bili 22.4, Alk phos 697, AST//100, INR 1.3, Direct bilirubin >14.0. PET scan on 2/21/25 showed extensive hepatic metastatic disease with innumerable metastatic masses throughout the liver all of which are markedly FDG avid. FDG avid metastatic left para-aortic retroperitoneal lymph node in the upper abdomen. Markedly FDG avid biopsy-proven multicentric left breast cancer with metastatic level 1 and level 3 left axillary lymphadenopathy. ED dicussed case with Hematology who recommends initiating chemotherapy for visceral crisis. Pt admitted for Transaminitis.     Past Medical History:   Diagnosis Date    Depression     controlled with meds    Fibroid December 2, 2022    Hypertension     It's controlled with meds.       Past Surgical History:   Procedure Laterality Date    FUNCTIONAL ENDOSCOPIC SINUS SURGERY (FESS) USING COMPUTER-ASSISTED NAVIGATION   02/2022       Review of patient's allergies indicates:   Allergen Reactions    Codeine Rash       No current facility-administered medications on file prior to encounter.     Current Outpatient Medications on File Prior to Encounter   Medication Sig    diazePAM (VALIUM) 5 MG tablet Take 5 mg by mouth every 12 (twelve) hours as needed.    lisinopril 10 MG tablet Take 10 mg by mouth once daily.    sertraline (ZOLOFT) 50 MG tablet Take 50 mg by mouth once daily.    traMADoL (ULTRAM) 50 mg tablet Take 1 tablet (50 mg total) by mouth every 12 (twelve) hours as needed for Pain.    [DISCONTINUED] sertraline (ZOLOFT) 25 MG tablet Take 25 mg by mouth once daily.    [DISCONTINUED] rosuvastatin (CRESTOR) 5 MG tablet Take 5 mg by mouth every evening.     Family History       Problem Relation (Age of Onset)    Breast cancer Mother (63), Other (60 - 69)    HPV Daughter (23)    Hypertension Mother    Lung cancer Maternal Grandmother    Prostate cancer Father (70)    Skin cancer Paternal Grandfather    Thyroid disease Maternal Aunt          Tobacco Use    Smoking status: Never    Smokeless tobacco: Never   Substance and Sexual Activity    Alcohol use: No    Drug use: No    Sexual activity: Yes     Partners: Male     Birth control/protection: OCP     Review of Systems   Constitutional:  Positive for activity change. Negative for chills and fever.   HENT:  Negative for trouble swallowing.    Respiratory:  Negative for cough, choking, chest tightness, shortness of breath and stridor.    Cardiovascular:  Negative for chest pain and leg swelling.   Gastrointestinal:  Negative for abdominal pain, constipation, diarrhea, nausea and vomiting.   Genitourinary:  Negative for difficulty urinating.        Reports dark urine   Musculoskeletal:  Negative for arthralgias.   Skin:  Positive for color change.   Psychiatric/Behavioral:  Negative for agitation, behavioral problems and confusion.      Objective:     Vital Signs (Most Recent):  Temp:  98.7 °F (37.1 °C) (02/24/25 0952)  Pulse: 96 (02/24/25 1030)  Resp: 15 (02/24/25 1030)  BP: 117/64 (02/24/25 1030)  SpO2: 96 % (02/24/25 1030) Vital Signs (24h Range):  Temp:  [98.7 °F (37.1 °C)] 98.7 °F (37.1 °C)  Pulse:  [] 96  Resp:  [15-20] 15  SpO2:  [95 %-96 %] 96 %  BP: (117-142)/(64-78) 117/64     Weight: 70.4 kg (155 lb 3.3 oz)  Body mass index is 28.39 kg/m².     Physical Exam  Vitals reviewed.   Constitutional:       General: She is not in acute distress.     Appearance: She is ill-appearing.   HENT:      Head: Normocephalic.   Eyes:      General: Scleral icterus present.   Cardiovascular:      Rate and Rhythm: Normal rate.      Pulses: Normal pulses.   Pulmonary:      Effort: Pulmonary effort is normal. No respiratory distress.   Abdominal:      General: Bowel sounds are normal. There is no distension.      Palpations: Abdomen is soft.   Genitourinary:     Comments: deferred  Musculoskeletal:      Cervical back: Neck supple.      Right lower leg: No edema.      Left lower leg: No edema.   Skin:     Coloration: Skin is jaundiced.   Neurological:      Mental Status: She is alert and oriented to person, place, and time.   Psychiatric:         Mood and Affect: Mood normal.         Behavior: Behavior normal.         Thought Content: Thought content normal.                Significant Labs: All pertinent labs within the past 24 hours have been reviewed.  CBC:   Recent Labs   Lab 02/24/25  0938   WBC 11.61   HGB 14.6   HCT 43.6        CMP:   Recent Labs   Lab 02/24/25  0938      K 3.5      CO2 20*   GLU 92   BUN 16   CREATININE 0.8   CALCIUM 8.7   PROT 6.5   ALBUMIN 2.4*   BILITOT 22.4*   ALKPHOS 697*   *   *   ANIONGAP 12     Lipase:   Recent Labs   Lab 02/24/25  0938   LIPASE 39     Magnesium:   Recent Labs   Lab 02/24/25  0938   MG 1.9     Urine Studies:   Recent Labs   Lab 02/24/25  1126   COLORU Yellow   APPEARANCEUA Hazy*   PHUR 6.0   SPECGRAV 1.025   PROTEINUA 1+*    GLUCUA Negative   KETONESU Negative   BILIRUBINUA 3+*   OCCULTUA Negative   NITRITE Negative   UROBILINOGEN 2.0-3.0*   LEUKOCYTESUR Trace*   RBCUA 2   WBCUA 21*   BACTERIA Few*   SQUAMEPITHEL 5   HYALINECASTS 3*       Significant Imaging: I have reviewed all pertinent imaging results/findings within the past 24 hours.  Assessment/Plan:     * Transaminitis  49 year old female with a PMHx of metastatic breast cancer admitted for Transaminitis (T bili 22.4, AST//100). PET CT on 2/21 extensive hepatic metastatic disease with innumerable metastatic masses throughout the liver all of which are markedly FDG avid. FDG avid metastatic left para-aortic retroperitoneal lymph node in the upper abdomen. Markedly FDG avid biopsy-proven multicentric left breast cancer with metastatic level 1 and level 3 left axillary lymphadenopathy. ED discussed case with Hematology/Oncology concerned for visceral crisis, would like to admit to initiate chemotherapy.    PLAN:    -Hematology/Oncology consulted to initiate chemotherapy. Patient has been approved to start chemo  -MRCP pending to rule out obstruction  -Orders for PICC line placed  -Pain management  -IVF  -Antiemetics PRN      Primary hypertension  Patient's blood pressure range in the last 24 hours was: BP  Min: 117/64  Max: 142/78.The patient's inpatient anti-hypertensive regimen is listed below:  Current Antihypertensives  lisinopriL tablet 10 mg, Daily, Oral    Plan  - BP is controlled, no changes needed to their regimen    Moderate episode of recurrent major depressive disorder  -Mood stable  -Continue Zoloft        VTE Risk Mitigation (From admission, onward)           Ordered     enoxaparin injection 40 mg  Every 24 hours         02/24/25 1335     IP VTE HIGH RISK PATIENT  Once         02/24/25 1201     Place sequential compression device  Until discontinued         02/24/25 1201                                  JAKOB Lloyd  Department of Hospital  Medicine  [facility] - Emergency Dept.

## 2025-02-24 NOTE — Clinical Note
Diagnosis: Metastasis to liver [617877]   Reason for IP Medical Treatment  (Clinical interventions that can only be accomplished in the IP setting? ) :: Visceral crisis   Plans for Post-Acute care--if anticipated (pick the single best option):: A. No post acute care anticipated at this time   Special Needs:: No Special Needs [1]

## 2025-02-24 NOTE — ASSESSMENT & PLAN NOTE
Patient's blood pressure range in the last 24 hours was: BP  Min: 117/64  Max: 142/78.The patient's inpatient anti-hypertensive regimen is listed below:  Current Antihypertensives  lisinopriL tablet 10 mg, Daily, Oral    Plan  - BP is controlled, no changes needed to their regimen

## 2025-02-24 NOTE — HPI
Serina Barriga is a 49 year old female who  has a past medical history of Depression, Fibroid, and Hypertension who presented with her  for evaluation of worsening jaundice. She saw Dr. Lopez on 2/19 to discuss tx as she was recently diagnosed with breast cancer. Today she contacted her PCP as she was more jaundiced today and urine is darker. PCP advised them to go to the ER. ED workup showed WBC count 11.61, Hgb/Hct 14.6/43.6, T bili 22.4, Alk phos 697, AST//100, INR 1.3, Direct bilirubin >14.0. PET scan on 2/21/25 showed extensive hepatic metastatic disease with innumerable metastatic masses throughout the liver all of which are markedly FDG avid. FDG avid metastatic left para-aortic retroperitoneal lymph node in the upper abdomen. Markedly FDG avid biopsy-proven multicentric left breast cancer with metastatic level 1 and level 3 left axillary lymphadenopathy. ED dicussed case with Hematology who recommends initiating chemotherapy for visceral crisis. Pt admitted for Transaminitis.

## 2025-02-24 NOTE — ED PROVIDER NOTES
SCRIBE #1 NOTE: I, Immanuel Adrian, am scribing for, and in the presence of, Bob Nagel DO. I have scribed the entire note.       History     Chief Complaint   Patient presents with    Abdominal Pain     Recent dx with breast cancer and found mets to liver 2 days ago. Pt c/o abdominal pain, and was told to come to ER by PCP for admit.     Review of patient's allergies indicates:   Allergen Reactions    Codeine Rash         History of Present Illness     HPI    2/24/2025, 9:21 AM  History obtained from the patient      History of Present Illness: Serina Barriga is a 50 y.o. female patient with a PMHx of HTN, fibroid, depression, and breast cancer who presents to the Emergency Department for evaluation of right sided abdominal pain. Pt was told by her PCP to come to the ED for possible admission after worsening jaundice. Pt states she had a discussion with Dr Esquivel regarding possible admission to start a medication. Pt states her urine has recently become brown recently. Symptoms are constant and moderate in severity. No mitigating or exacerbating factors reported. Associated sxs include nausea. Patient denies any vomiting. No prior Tx specified.  No further complaints or concerns at this time.       Arrival mode: Personal Transportation    PCP: Obed Alford MD        Past Medical History:  Past Medical History:   Diagnosis Date    Depression     controlled with meds    Fibroid December 2, 2022    Hypertension     It's controlled with meds.       Past Surgical History:  Past Surgical History:   Procedure Laterality Date    FUNCTIONAL ENDOSCOPIC SINUS SURGERY (FESS) USING COMPUTER-ASSISTED NAVIGATION  02/2022         Family History:  Family History   Problem Relation Name Age of Onset    Breast cancer Mother Maria Angelo 63        triple negative breast cancer  masectomy    Hypertension Mother Maria Angelo     Prostate cancer Father Rikki Angelo 70        prostectomy    Thyroid disease  Maternal Aunt Sherry Lazarus     Lung cancer Maternal Grandmother          smoker    Skin cancer Paternal Grandfather          face    Other (HPV) Daughter  23    Breast cancer Other Fifi 60 - 69    Ovarian cancer Neg Hx      Colon cancer Neg Hx         Social History:  Social History     Tobacco Use    Smoking status: Never    Smokeless tobacco: Never   Substance and Sexual Activity    Alcohol use: No    Drug use: No    Sexual activity: Yes     Partners: Male     Birth control/protection: OCP        Review of Systems     Review of Systems   Constitutional:  Negative for fever.   HENT:  Negative for sore throat.    Respiratory:  Negative for shortness of breath.    Cardiovascular:  Negative for chest pain.   Gastrointestinal:  Positive for abdominal pain (right sided) and nausea. Negative for vomiting.   Genitourinary:  Negative for dysuria.   Musculoskeletal:  Negative for back pain.   Skin:  Positive for color change (jaundice). Negative for rash.   Neurological:  Negative for weakness.   Hematological:  Does not bruise/bleed easily.   All other systems reviewed and are negative.     Physical Exam     Initial Vitals [02/24/25 0909]   BP Pulse Resp Temp SpO2   129/72 (!) 120 20 98.7 °F (37.1 °C) 95 %      MAP       --          Physical Exam  Vitals reviewed.   Constitutional:       General: She is in acute distress.   Cardiovascular:      Rate and Rhythm: Normal rate and regular rhythm.      Heart sounds: No murmur heard.  Pulmonary:      Effort: Pulmonary effort is normal.      Breath sounds: No wheezing.   Abdominal:      Comments: Right upper quadrant tenderness to palpation  non rigidity   Musculoskeletal:         General: No tenderness. Normal range of motion.   Skin:     Coloration: Skin is jaundiced.   Neurological:      General: No focal deficit present.      Mental Status: She is alert and oriented to person, place, and time.      Sensory: No sensory deficit.      Motor: No weakness.       Nursing Notes  and Vital Signs Reviewed.     ED Course   Critical Care    Date/Time: 2/24/2025 12:00 PM    Performed by: Bob Nagel DO  Authorized by: Bob Nagel DO  Direct patient critical care time: 20 minutes  Ordering / reviewing critical care time: 10 minutes  Documentation critical care time: 5 minutes  Consulting other physicians critical care time: 10 minutes  Total critical care time (exclusive of procedural time) : 45 minutes  Critical care time was exclusive of separately billable procedures and treating other patients.  Critical care was necessary to treat or prevent imminent or life-threatening deterioration of the following conditions: Visceral crisis.  Critical care was time spent personally by me on the following activities: discussions with consultants, interpretation of cardiac output measurements, evaluation of patient's response to treatment, examination of patient, obtaining history from patient or surrogate, ordering and performing treatments and interventions, ordering and review of laboratory studies, ordering and review of radiographic studies, re-evaluation of patient's condition and review of old charts.        ED Vital Signs:  Vitals:    02/25/25 1949 02/25/25 2000 02/25/25 2036 02/25/25 2145   BP: (!) 106/50      Pulse: 76 68  68   Resp: 15  16    Temp: 98 °F (36.7 °C)      TempSrc: Oral      SpO2: (!) 93%      Weight:       Height:        02/25/25 2333 02/26/25 0015 02/26/25 0105 02/26/25 0351   BP:  (!) 99/58     Pulse: 66 78 70 84   Resp:  16     Temp:  97.7 °F (36.5 °C)     TempSrc:  Oral     SpO2:  (!) 92%     Weight:       Height:        02/26/25 0400 02/26/25 0406 02/26/25 0635 02/26/25 0730   BP:  130/64     Pulse: 84 88  89   Resp:  16 16    Temp:  97.8 °F (36.6 °C)     TempSrc:  Oral     SpO2:  (!) 93%     Weight:       Height:        02/26/25 0751 02/26/25 0851 02/26/25 1158   BP: 116/65  (!) 101/56   Pulse: 88 91 91   Resp: 19  18   Temp: 98.2 °F (36.8 °C)  98.1 °F  (36.7 °C)   TempSrc: Oral  Oral   SpO2: (!) 92%  (!) 92%   Weight:      Height:          Abnormal Lab Results:  Labs Reviewed   CBC W/ AUTO DIFFERENTIAL - Abnormal       Result Value    WBC 11.61      RBC 5.42 (*)     Hemoglobin 14.6      Hematocrit 43.6      MCV 80 (*)     MCH 26.9 (*)     MCHC 33.5      RDW 21.1 (*)     Platelets 409      MPV 9.9      Immature Granulocytes 0.6 (*)     Gran # (ANC) 7.9 (*)     Immature Grans (Abs) 0.07 (*)     Lymph # 2.0      Mono # 1.4 (*)     Eos # 0.1      Baso # 0.11      nRBC 0      Gran % 68.3      Lymph % 17.6 (*)     Mono % 11.7      Eosinophil % 0.9      Basophil % 0.9      Differential Method Automated      Narrative:     Release to patient->Immediate   COMPREHENSIVE METABOLIC PANEL - Abnormal    Sodium 137      Potassium 3.5      Chloride 105      CO2 20 (*)     Glucose 92      BUN 16      Creatinine 0.8      Calcium 8.7      Total Protein 6.5      Albumin 2.4 (*)     Total Bilirubin 22.4 (*)     Alkaline Phosphatase 697 (*)      (*)      (*)     eGFR >60      Anion Gap 12      Narrative:     Release to patient->Immediate   PROTIME-INR - Abnormal    Prothrombin Time 14.7 (*)     INR 1.3 (*)     Narrative:     Release to patient->Immediate   BILIRUBIN, DIRECT - Abnormal    Bilirubin, Direct >14.0 (*)     Narrative:     Release to patient->Immediate   URINALYSIS, REFLEX TO URINE CULTURE - Abnormal    Specimen UA Urine, Clean Catch      Color, UA Yellow      Appearance, UA Hazy (*)     pH, UA 6.0      Specific Gravity, UA 1.025      Protein, UA 1+ (*)     Glucose, UA Negative      Ketones, UA Negative      Bilirubin (UA) 3+ (*)     Occult Blood UA Negative      Nitrite, UA Negative      Urobilinogen, UA 2.0-3.0 (*)     Leukocytes, UA Trace (*)     Narrative:     Specimen Source->Urine   URINALYSIS MICROSCOPIC - Abnormal    RBC, UA 2      WBC, UA 21 (*)     Bacteria Few (*)     Squam Epithel, UA 5      Hyaline Casts, UA 3 (*)     Microscopic Comment SEE  COMMENT      Narrative:     Specimen Source->Urine   HEPATITIS C ANTIBODY    Hepatitis C Ab Negative      Narrative:     Release to patient->Immediate   HEP C VIRUS HOLD SPECIMEN    HEP C Virus Hold Specimen Hold for HCV sendout      Narrative:     Release to patient->Immediate   HIV 1 / 2 ANTIBODY    HIV 1/2 Ag/Ab Negative      Narrative:     Release to patient->Immediate   LIPASE    Lipase 39      Narrative:     Release to patient->Immediate   MAGNESIUM    Magnesium 1.9      Narrative:     Release to patient->Immediate        All Lab Results:  Results for orders placed or performed during the hospital encounter of 02/24/25   Hepatitis C Antibody    Collection Time: 02/24/25  9:38 AM   Result Value Ref Range    Hepatitis C Ab Negative Negative   HCV Virus Hold Specimen    Collection Time: 02/24/25  9:38 AM   Result Value Ref Range    HEP C Virus Hold Specimen Hold for HCV sendout    HIV 1/2 Ag/Ab (4th Gen)    Collection Time: 02/24/25  9:38 AM   Result Value Ref Range    HIV 1/2 Ag/Ab Negative Negative   CBC auto differential    Collection Time: 02/24/25  9:38 AM   Result Value Ref Range    WBC 11.61 3.90 - 12.70 K/uL    RBC 5.42 (H) 4.00 - 5.40 M/uL    Hemoglobin 14.6 12.0 - 16.0 g/dL    Hematocrit 43.6 37.0 - 48.5 %    MCV 80 (L) 82 - 98 fL    MCH 26.9 (L) 27.0 - 31.0 pg    MCHC 33.5 32.0 - 36.0 g/dL    RDW 21.1 (H) 11.5 - 14.5 %    Platelets 409 150 - 450 K/uL    MPV 9.9 9.2 - 12.9 fL    Immature Granulocytes 0.6 (H) 0.0 - 0.5 %    Gran # (ANC) 7.9 (H) 1.8 - 7.7 K/uL    Immature Grans (Abs) 0.07 (H) 0.00 - 0.04 K/uL    Lymph # 2.0 1.0 - 4.8 K/uL    Mono # 1.4 (H) 0.3 - 1.0 K/uL    Eos # 0.1 0.0 - 0.5 K/uL    Baso # 0.11 0.00 - 0.20 K/uL    nRBC 0 0 /100 WBC    Gran % 68.3 38.0 - 73.0 %    Lymph % 17.6 (L) 18.0 - 48.0 %    Mono % 11.7 4.0 - 15.0 %    Eosinophil % 0.9 0.0 - 8.0 %    Basophil % 0.9 0.0 - 1.9 %    Differential Method Automated    Comprehensive metabolic panel    Collection Time: 02/24/25  9:38 AM    Result Value Ref Range    Sodium 137 136 - 145 mmol/L    Potassium 3.5 3.5 - 5.1 mmol/L    Chloride 105 95 - 110 mmol/L    CO2 20 (L) 23 - 29 mmol/L    Glucose 92 70 - 110 mg/dL    BUN 16 6 - 20 mg/dL    Creatinine 0.8 0.5 - 1.4 mg/dL    Calcium 8.7 8.7 - 10.5 mg/dL    Total Protein 6.5 6.0 - 8.4 g/dL    Albumin 2.4 (L) 3.5 - 5.2 g/dL    Total Bilirubin 22.4 (H) 0.1 - 1.0 mg/dL    Alkaline Phosphatase 697 (H) 40 - 150 U/L     (H) 10 - 40 U/L     (H) 10 - 44 U/L    eGFR >60 >60 mL/min/1.73 m^2    Anion Gap 12 8 - 16 mmol/L   Lipase    Collection Time: 02/24/25  9:38 AM   Result Value Ref Range    Lipase 39 4 - 60 U/L   Magnesium    Collection Time: 02/24/25  9:38 AM   Result Value Ref Range    Magnesium 1.9 1.6 - 2.6 mg/dL   Protime-INR    Collection Time: 02/24/25  9:38 AM   Result Value Ref Range    Prothrombin Time 14.7 (H) 9.0 - 12.5 sec    INR 1.3 (H) 0.8 - 1.2   Bilirubin, direct    Collection Time: 02/24/25  9:38 AM   Result Value Ref Range    Bilirubin, Direct >14.0 (H) 0.1 - 0.3 mg/dL   Urine culture    Collection Time: 02/24/25 11:26 AM    Specimen: Urine   Result Value Ref Range    Urine Culture, Routine No growth    Urinalysis, Reflex to Urine Culture Urine, Clean Catch    Collection Time: 02/24/25 11:26 AM    Specimen: Urine, Clean Catch   Result Value Ref Range    Specimen UA Urine, Clean Catch     Color, UA Yellow Yellow, Straw, Bianca    Appearance, UA Hazy (A) Clear    pH, UA 6.0 5.0 - 8.0    Specific Gravity, UA 1.025 1.005 - 1.030    Protein, UA 1+ (A) Negative    Glucose, UA Negative Negative    Ketones, UA Negative Negative    Bilirubin (UA) 3+ (A) Negative    Occult Blood UA Negative Negative    Nitrite, UA Negative Negative    Urobilinogen, UA 2.0-3.0 (A) <2.0 EU/dL    Leukocytes, UA Trace (A) Negative   Urinalysis Microscopic    Collection Time: 02/24/25 11:26 AM   Result Value Ref Range    RBC, UA 2 0 - 4 /hpf    WBC, UA 21 (H) 0 - 5 /hpf    Bacteria Few (A) None-Occ /hpf     Squam Epithel, UA 5 /hpf    Hyaline Casts, UA 3 (A) 0-1/lpf /lpf    Microscopic Comment SEE COMMENT    Acetaminophen Level    Collection Time: 02/24/25  6:17 PM   Result Value Ref Range    Acetaminophen (Tylenol), Serum 3.0 (L) 10.0 - 20.0 ug/mL   Comprehensive metabolic panel    Collection Time: 02/25/25  4:19 AM   Result Value Ref Range    Sodium 139 136 - 145 mmol/L    Potassium 3.8 3.5 - 5.1 mmol/L    Chloride 108 95 - 110 mmol/L    CO2 20 (L) 23 - 29 mmol/L    Glucose 118 (H) 70 - 110 mg/dL    BUN 15 6 - 20 mg/dL    Creatinine 0.8 0.5 - 1.4 mg/dL    Calcium 8.0 (L) 8.7 - 10.5 mg/dL    Total Protein 5.7 (L) 6.0 - 8.4 g/dL    Albumin 2.1 (L) 3.5 - 5.2 g/dL    Total Bilirubin 19.1 (H) 0.1 - 1.0 mg/dL    Alkaline Phosphatase 566 (H) 40 - 150 U/L     (H) 10 - 40 U/L    ALT 85 (H) 10 - 44 U/L    eGFR >60 >60 mL/min/1.73 m^2    Anion Gap 11 8 - 16 mmol/L   CBC auto differential    Collection Time: 02/25/25  4:19 AM   Result Value Ref Range    WBC 6.46 3.90 - 12.70 K/uL    RBC 4.78 4.00 - 5.40 M/uL    Hemoglobin 13.3 12.0 - 16.0 g/dL    Hematocrit 38.1 37.0 - 48.5 %    MCV 80 (L) 82 - 98 fL    MCH 27.8 27.0 - 31.0 pg    MCHC 34.9 32.0 - 36.0 g/dL    RDW 20.7 (H) 11.5 - 14.5 %    Platelets 367 150 - 450 K/uL    MPV 9.8 9.2 - 12.9 fL    Immature Granulocytes 0.8 (H) 0.0 - 0.5 %    Gran # (ANC) 5.0 1.8 - 7.7 K/uL    Immature Grans (Abs) 0.05 (H) 0.00 - 0.04 K/uL    Lymph # 1.2 1.0 - 4.8 K/uL    Mono # 0.1 (L) 0.3 - 1.0 K/uL    Eos # 0.0 0.0 - 0.5 K/uL    Baso # 0.04 0.00 - 0.20 K/uL    nRBC 0 0 /100 WBC    Gran % 77.8 (H) 38.0 - 73.0 %    Lymph % 18.9 18.0 - 48.0 %    Mono % 1.7 (L) 4.0 - 15.0 %    Eosinophil % 0.2 0.0 - 8.0 %    Basophil % 0.6 0.0 - 1.9 %    Differential Method Automated    Hepatitis panel, acute    Collection Time: 02/25/25  4:19 AM   Result Value Ref Range    Hepatitis B Surface Ag Non-reactive Non-reactive    Hep B C IgM Non-reactive Non-reactive    Hep A IgM Non-reactive Non-reactive     Hepatitis C Ab Non-reactive Non-reactive   Protime-INR    Collection Time: 02/25/25  4:19 AM   Result Value Ref Range    Prothrombin Time 15.5 (H) 9.0 - 12.5 sec    INR 1.4 (H) 0.8 - 1.2   Protime-INR    Collection Time: 02/26/25  4:24 AM   Result Value Ref Range    Prothrombin Time 18.1 (H) 9.0 - 12.5 sec    INR 1.7 (H) 0.8 - 1.2   CBC Auto Differential    Collection Time: 02/26/25  4:24 AM   Result Value Ref Range    WBC 11.32 3.90 - 12.70 K/uL    RBC 4.90 4.00 - 5.40 M/uL    Hemoglobin 13.3 12.0 - 16.0 g/dL    Hematocrit 39.1 37.0 - 48.5 %    MCV 80 (L) 82 - 98 fL    MCH 27.1 27.0 - 31.0 pg    MCHC 34.0 32.0 - 36.0 g/dL    RDW 20.9 (H) 11.5 - 14.5 %    Platelets 344 150 - 450 K/uL    MPV 10.4 9.2 - 12.9 fL    Immature Granulocytes 0.6 (H) 0.0 - 0.5 %    Gran # (ANC) 10.2 (H) 1.8 - 7.7 K/uL    Immature Grans (Abs) 0.07 (H) 0.00 - 0.04 K/uL    Lymph # 0.7 (L) 1.0 - 4.8 K/uL    Mono # 0.4 0.3 - 1.0 K/uL    Eos # 0.0 0.0 - 0.5 K/uL    Baso # 0.01 0.00 - 0.20 K/uL    nRBC 0 0 /100 WBC    Gran % 89.9 (H) 38.0 - 73.0 %    Lymph % 6.0 (L) 18.0 - 48.0 %    Mono % 3.4 (L) 4.0 - 15.0 %    Eosinophil % 0.0 0.0 - 8.0 %    Basophil % 0.1 0.0 - 1.9 %    Differential Method Automated    Comprehensive metabolic panel    Collection Time: 02/26/25  4:24 AM   Result Value Ref Range    Sodium 139 136 - 145 mmol/L    Potassium 3.8 3.5 - 5.1 mmol/L    Chloride 108 95 - 110 mmol/L    CO2 23 23 - 29 mmol/L    Glucose 135 (H) 70 - 110 mg/dL    BUN 24 (H) 6 - 20 mg/dL    Creatinine 0.9 0.5 - 1.4 mg/dL    Calcium 7.9 (L) 8.7 - 10.5 mg/dL    Total Protein 5.9 (L) 6.0 - 8.4 g/dL    Albumin 2.2 (L) 3.5 - 5.2 g/dL    Total Bilirubin 21.3 (H) 0.1 - 1.0 mg/dL    Alkaline Phosphatase 570 (H) 40 - 150 U/L     (H) 10 - 40 U/L    ALT 95 (H) 10 - 44 U/L    eGFR >60 >60 mL/min/1.73 m^2    Anion Gap 8 8 - 16 mmol/L   POCT urine pregnancy    Collection Time: 02/26/25 10:15 AM   Result Value Ref Range    POC Preg Test, Ur Negative Negative      Acceptable Yes        Imaging Results:  Imaging Results              MRI MRCP Abdomen WO Contrast 3D WO Independent WS XPD (Final result)  Result time 02/24/25 18:32:55      Final result by Joaquina Carlos MD (02/24/25 18:32:55)                   Impression:      Imaging significantly degraded.  No keeley biliary or pancreatic ductal dilatation.  Apparent partial absence or defe of proximal CBD on MIPS imaging likely artifactual.  Innumerable hepatic metastases and small volume ascites redemonstrated    Finalized on: 2/24/2025 6:32 PM By:  Joaquina Carlos MD  [facility]# [id number]      2025-02-24 18:34:[id number]     [facility]               Narrative:    EXAM: MRI MRCP ABDOMEN WO CONTRAST 3D WO INDEPENDENT WS XPD    CLINICAL HISTORY: Hyperbilirubinemia liver metastases breast cancer  Comparison PET/CT    Technique is standardt.'  Cholangiopancreatography MRCP noncontrast                                           X-Ray Chest AP Portable (Final result)  Result time 02/24/25 14:29:15      Final result by Олег Santo MD (02/24/25 14:29:15)                   Impression:      1.  Left lower lobe scarring or atelectasis.  Lungs are otherwise clear.  Early infectious process difficult to exclude in the right clinical setting.    2.  Interval placement of a right arm PICC line with tip in the proximal SVC.    3.  Stable findings as noted above.      Electronically signed by: Олег Santo MD  Date:    02/24/2025  Time:    14:29               Narrative:    EXAMINATION:  XR CHEST AP PORTABLE    CLINICAL HISTORY:  Presence of other vascular implants and grafts    COMPARISON:  PET-CT scan from February 21, 2025.    FINDINGS:  EKG leads overlie the chest.  Clothing artifact is present.  Left lower lobe scarring or atelectasis.  The lungs are otherwise clear.  The cardiac silhouette size is normal. The trachea is midline and the mediastinal width is normal. Negative for focal infiltrate, effusion or pneumothorax. Pulmonary  vasculature is normal. Negative for osseous abnormalities. Tortuous aorta.  Marginal spondylosis.  Left arm PICC line with tip in the proximal SVC.                                                  The Emergency Provider reviewed the vital signs and test results, which are outlined above.     ED Discussion     11:52 AM: Discussed pt's case with Vahe Ivey MD (Hematology and Oncology) who recommends pt get a PICC line for the chemo if she does not have a port. Discussed with Dr. Lopez, (Hem/Onc).  There is a concern for visceral crisis, will admit for chemotherapy.    11:53 AM: Discussed case with JAKOB Lloyd,  ([facility]). JAKOB Lloyd agrees with current care and management of pt and accepts admission.   Admitting Service: [facility]  Admitting Physician: Dr. Segovia   Admit to: inpt Med/Tele    11:53 AM: Re-evaluated pt.  I have discussed test results, shared treatment plan, and the need for admission with patient/family/caretaker at bedside. Pt and/or family/caretaker express understanding at this time and agree with all information. All questions answered. Pt/caretaker/family member(s) have no further questions or concerns at this time. Pt is ready for admit.        ED Course as of 02/27/25 0606   Mon Feb 24, 2025   1144 HIV 1/2 Ag/Ab (4th Gen)  Negative [CD]   1144 Bilirubin, direct(!)  Elevated [CD]   1144 Lipase  Within normal limit [CD]   1144 Protime-INR(!)  Elevated [CD]   1144 CBC auto differential(!)  Nonspecific finding [CD]   1144 Hepatitis C Antibody  Negative [CD]   1144 Magnesium  Within normal limits [CD]   1145 Comprehensive metabolic panel(!)  Hyperbilirubinemia with elevated transaminases and elevated alkaline phosphatase [CD]      ED Course User Index  [CD] Bob Nagel, DO     Medical Decision Making  Amount and/or Complexity of Data Reviewed  Labs: ordered. Decision-making details documented in ED Course.     Details: Urinalysis shows  UTI  Radiology: ordered.     Details: Chest x-ray shows left lower lobe scarring or atelectasis, MCP shows no evidence of ductal dilatation    Risk  Decision regarding hospitalization.  Risk Details: Differential diagnosis includes but is limited to: Visceral crisis, pancreatitis, choledocholithiasis, cholecystitis, hepatitis, electrolyte abnormality                ED Medication(s):  Medications   ondansetron (ZOFRAN) 16 mg, dexAMETHasone 20 mg in 0.9% NaCl 50 mL IVPB (0 mg Intravenous Stopped 2/24/25 1829)   famotidine (PF) injection 20 mg (20 mg Intravenous Given 2/24/25 1816)   diphenhydrAMINE injection 25 mg (25 mg Intravenous Given 2/24/25 1816)   DOCEtaxel 35 mg/m2 = 60 mg in 0.9% NaCl 118 mL chemo infusion (0 mg Intravenous Stopped 2/24/25 2027)   CARBOplatin (PARAPLATIN) 240 mg in 0.9% NaCl 309 mL chemo infusion (0 mg Intravenous Stopped 2/24/25 2110)   ceFAZolin injection (2 g Intravenous Given 2/26/25 1040)   midazolam injection (1 mg Intravenous Given 2/26/25 1045)   fentaNYL 50 mcg/mL injection (50 mcg Intravenous Given 2/26/25 1045)   LIDOcaine (PF) 20 mg/ml (2%) injection (5 mLs Subcutaneous Given 2/26/25 1041)   heparin, porcine (PF) 100 unit/mL injection flush (400 Units Intravenous Given 2/26/25 1055)       Discharge Medication List as of 2/26/2025  1:16 PM        START taking these medications    Details   ondansetron (ZOFRAN-ODT) 4 MG TbDL Take 1 tablet (4 mg total) by mouth every 8 (eight) hours as needed (nausea)., Starting Wed 2/26/2025, Until Wed 3/5/2025 at 2359, Normal              Follow-up Information       Obed Alford MD. Schedule an appointment as soon as possible for a visit in 3 day(s).    Specialty: Pediatrics  Contact information:  [address] [facility] [address] [phone]               Niyah Lopez MD. Schedule an appointment as soon as possible for a visit in 3 day(s).    Specialty: Hematology and Oncology  Contact information:  [address]  [place] [address]  [phone]                                 Scribe Attestation:   Scribe #1: I performed the above scribed service and the documentation accurately describes the services I performed. I attest to the accuracy of the note.     Attending:   Physician Attestation Statement for Scribe #1: I, Bob Nagel DO., personally performed the services described in this documentation, as scribed by Immanuel Adrian, in my presence, and it is both accurate and complete.           Clinical Impression       ICD-10-CM ICD-9-CM   1. Metastasis to liver  C78.7 197.7   2. Neoplasm of left breast  D49.3 239.3   3. Hyperbilirubinemia  E80.6 782.4   4. S/P PICC central line placement  Z95.828 V45.89   5. Malignant neoplasm of overlapping sites of left breast in female, estrogen receptor negative  C50.812 174.8    Z17.1 V86.1   6. Transaminitis  R74.01 790.4       Disposition:   Disposition: Admitted  Condition: Serious       Bob Nagel DO  02/27/25 0614

## 2025-02-24 NOTE — PROGRESS NOTES
Serina Barriga is a 49 y.o. female with depreesion, HTN, and recently diagnosed stage IV Breast Cancer, -/-/+.    She presented to [facility] ED for worsening abdominal pain and found to be in visceral crisis.  Plan for inpatient chemotherapy for visceral crisis with docetaxel and carboplatin.     I discussed the goal of therapy an potential side effects with the patient and her  as well as outpatient treatment.      Niyah Esquivel MD

## 2025-02-24 NOTE — PHARMACY MED REC
"Admission Medication History     The home medication history was taken by Siobhan Murphy.    You may go to "Admission" then "Reconcile Home Medications" tabs to review and/or act upon these items.     The home medication list has been updated by the Pharmacy department.   Please read ALL comments highlighted in yellow.   Please address this information as you see fit.    Feel free to contact us if you have any questions or require assistance.        Medications listed below were obtained from: Patient/family and Analytic software- [email or web address]      [facility] REC COMPLETED:       Siobhan Murphy  [id number]    Current Outpatient Medications on File Prior to Encounter   Medication Sig Dispense Refill Last Dose/Taking    diazePAM (VALIUM) 5 MG tablet Take 5 mg by mouth every 12 (twelve) hours as needed.   2/24/2025 Morning    lisinopril 10 MG tablet Take 10 mg by mouth once daily.   2/24/2025 Morning    sertraline (ZOLOFT) 50 MG tablet Take 50 mg by mouth once daily.   2/24/2025 Morning    traMADoL (ULTRAM) 50 mg tablet Take 1 tablet (50 mg total) by mouth every 12 (twelve) hours as needed for Pain. 30 tablet 0 2/24/2025 Morning                           .          "

## 2025-02-24 NOTE — SUBJECTIVE & OBJECTIVE
Past Medical History:   Diagnosis Date    Depression     controlled with meds    Fibroid December 2, 2022    Hypertension     It's controlled with meds.       Past Surgical History:   Procedure Laterality Date    FUNCTIONAL ENDOSCOPIC SINUS SURGERY (FESS) USING COMPUTER-ASSISTED NAVIGATION  02/2022       Review of patient's allergies indicates:   Allergen Reactions    Codeine Rash       No current facility-administered medications on file prior to encounter.     Current Outpatient Medications on File Prior to Encounter   Medication Sig    diazePAM (VALIUM) 5 MG tablet Take 5 mg by mouth every 12 (twelve) hours as needed.    lisinopril 10 MG tablet Take 10 mg by mouth once daily.    sertraline (ZOLOFT) 50 MG tablet Take 50 mg by mouth once daily.    traMADoL (ULTRAM) 50 mg tablet Take 1 tablet (50 mg total) by mouth every 12 (twelve) hours as needed for Pain.    [DISCONTINUED] sertraline (ZOLOFT) 25 MG tablet Take 25 mg by mouth once daily.    [DISCONTINUED] rosuvastatin (CRESTOR) 5 MG tablet Take 5 mg by mouth every evening.     Family History       Problem Relation (Age of Onset)    Breast cancer Mother (63), Other (60 - 69)    HPV Daughter (23)    Hypertension Mother    Lung cancer Maternal Grandmother    Prostate cancer Father (70)    Skin cancer Paternal Grandfather    Thyroid disease Maternal Aunt          Tobacco Use    Smoking status: Never    Smokeless tobacco: Never   Substance and Sexual Activity    Alcohol use: No    Drug use: No    Sexual activity: Yes     Partners: Male     Birth control/protection: OCP     Review of Systems   Constitutional:  Positive for activity change. Negative for chills and fever.   HENT:  Negative for trouble swallowing.    Respiratory:  Negative for cough, choking, chest tightness, shortness of breath and stridor.    Cardiovascular:  Negative for chest pain and leg swelling.   Gastrointestinal:  Negative for abdominal pain, constipation, diarrhea, nausea and vomiting.    Genitourinary:  Negative for difficulty urinating.        Reports dark urine   Musculoskeletal:  Negative for arthralgias.   Skin:  Positive for color change.   Psychiatric/Behavioral:  Negative for agitation, behavioral problems and confusion.      Objective:     Vital Signs (Most Recent):  Temp: 98.7 °F (37.1 °C) (02/24/25 0952)  Pulse: 96 (02/24/25 1030)  Resp: 15 (02/24/25 1030)  BP: 117/64 (02/24/25 1030)  SpO2: 96 % (02/24/25 1030) Vital Signs (24h Range):  Temp:  [98.7 °F (37.1 °C)] 98.7 °F (37.1 °C)  Pulse:  [] 96  Resp:  [15-20] 15  SpO2:  [95 %-96 %] 96 %  BP: (117-142)/(64-78) 117/64     Weight: 70.4 kg (155 lb 3.3 oz)  Body mass index is 28.39 kg/m².     Physical Exam  Vitals reviewed.   Constitutional:       General: She is not in acute distress.     Appearance: She is ill-appearing.   HENT:      Head: Normocephalic.   Eyes:      General: Scleral icterus present.   Cardiovascular:      Rate and Rhythm: Normal rate.      Pulses: Normal pulses.   Pulmonary:      Effort: Pulmonary effort is normal. No respiratory distress.   Abdominal:      General: Bowel sounds are normal. There is no distension.      Palpations: Abdomen is soft.   Genitourinary:     Comments: deferred  Musculoskeletal:      Cervical back: Neck supple.      Right lower leg: No edema.      Left lower leg: No edema.   Skin:     Coloration: Skin is jaundiced.   Neurological:      Mental Status: She is alert and oriented to person, place, and time.   Psychiatric:         Mood and Affect: Mood normal.         Behavior: Behavior normal.         Thought Content: Thought content normal.                Significant Labs: All pertinent labs within the past 24 hours have been reviewed.  CBC:   Recent Labs   Lab 02/24/25  0938   WBC 11.61   HGB 14.6   HCT 43.6        CMP:   Recent Labs   Lab 02/24/25  0938      K 3.5      CO2 20*   GLU 92   BUN 16   CREATININE 0.8   CALCIUM 8.7   PROT 6.5   ALBUMIN 2.4*   BILITOT 22.4*    ALKPHOS 697*   *   *   ANIONGAP 12     Lipase:   Recent Labs   Lab 02/24/25  0938   LIPASE 39     Magnesium:   Recent Labs   Lab 02/24/25  0938   MG 1.9     Urine Studies:   Recent Labs   Lab 02/24/25  1126   COLORU Yellow   APPEARANCEUA Hazy*   PHUR 6.0   SPECGRAV 1.025   PROTEINUA 1+*   GLUCUA Negative   KETONESU Negative   BILIRUBINUA 3+*   OCCULTUA Negative   NITRITE Negative   UROBILINOGEN 2.0-3.0*   LEUKOCYTESUR Trace*   RBCUA 2   WBCUA 21*   BACTERIA Few*   SQUAMEPITHEL 5   HYALINECASTS 3*       Significant Imaging: I have reviewed all pertinent imaging results/findings within the past 24 hours.

## 2025-02-24 NOTE — PROCEDURES
"Serina Barriga is a 49 y.o. female patient.    Temp: 98.7 °F (37.1 °C) (02/24/25 0952)  Pulse: 93 (02/24/25 1300)  Resp: 16 (02/24/25 1300)  BP: 128/68 (02/24/25 1300)  SpO2: 95 % (02/24/25 1300)  Weight: 70.4 kg (155 lb 3.3 oz) (02/24/25 0952)  Height: 5' 2" (157.5 cm) (02/24/25 0952)    PICC  Date/Time: 2/24/2025 2:15 PM  Performed by: Kedar Figueroa RN  Consent Done: Yes  Time out: Immediately prior to procedure a time out was called to verify the correct patient, procedure, equipment, support staff and site/side marked as required  Indications: med administration  Anesthesia: local infiltration  Local anesthetic: lidocaine 1% without epinephrine  Anesthetic Total (mL): 2  Preparation: skin prepped with chlorhexidine (without alcohol)  Skin prep agent dried: skin prep agent completely dried prior to procedure  Sterile barriers: all five maximum sterile barriers used - cap, mask, sterile gown, sterile gloves, and large sterile sheet  Hand hygiene: hand hygiene performed prior to central venous catheter insertion  Location details: right basilic  Catheter type: double lumen  Catheter size: 4 Fr  Catheter Length: 30cm    Ultrasound guidance: yes  Vessel Caliber: medium and patent, compressibility normal  Needle advanced into vessel with real time Ultrasound guidance.  Guidewire confirmed in vessel.  Sterile sheath used.  Number of attempts: 1  Post-procedure: blood return through all ports, chlorhexidine patch and sterile dressing applied  Specimens: No  Implants: No  Assessment: placement verified by x-ray, free fluid flow, no pneumothorax on x-ray and successful placement          Kedar Figueroa RN  2/24/2025    "

## 2025-02-24 NOTE — ASSESSMENT & PLAN NOTE
49 year old female with a PMHx of metastatic breast cancer admitted for Transaminitis (T bili 22.4, AST//100). PET CT on 2/21 extensive hepatic metastatic disease with innumerable metastatic masses throughout the liver all of which are markedly FDG avid. FDG avid metastatic left para-aortic retroperitoneal lymph node in the upper abdomen. Markedly FDG avid biopsy-proven multicentric left breast cancer with metastatic level 1 and level 3 left axillary lymphadenopathy. ED discussed case with Hematology/Oncology concerned for visceral crisis, would like to admit to initiate chemotherapy.    PLAN:    -Hematology/Oncology consulted to initiate chemotherapy. Patient has been approved to start chemo  -MRCP pending to rule out obstruction  -Orders for PICC line placed  -Pain management  -IVF  -Antiemetics PRN

## 2025-02-25 LAB
ALBUMIN SERPL BCP-MCNC: 2.1 G/DL (ref 3.5–5.2)
ALP SERPL-CCNC: 566 U/L (ref 40–150)
ALT SERPL W/O P-5'-P-CCNC: 85 U/L (ref 10–44)
ANION GAP SERPL CALC-SCNC: 11 MMOL/L (ref 8–16)
AST SERPL-CCNC: 395 U/L (ref 10–40)
BACTERIA UR CULT: NO GROWTH
BASOPHILS # BLD AUTO: 0.04 K/UL (ref 0–0.2)
BASOPHILS NFR BLD: 0.6 % (ref 0–1.9)
BILIRUB SERPL-MCNC: 19.1 MG/DL (ref 0.1–1)
BUN SERPL-MCNC: 15 MG/DL (ref 6–20)
CALCIUM SERPL-MCNC: 8 MG/DL (ref 8.7–10.5)
CHLORIDE SERPL-SCNC: 108 MMOL/L (ref 95–110)
CO2 SERPL-SCNC: 20 MMOL/L (ref 23–29)
CREAT SERPL-MCNC: 0.8 MG/DL (ref 0.5–1.4)
DIFFERENTIAL METHOD BLD: ABNORMAL
EOSINOPHIL # BLD AUTO: 0 K/UL (ref 0–0.5)
EOSINOPHIL NFR BLD: 0.2 % (ref 0–8)
ERYTHROCYTE [DISTWIDTH] IN BLOOD BY AUTOMATED COUNT: 20.7 % (ref 11.5–14.5)
EST. GFR  (NO RACE VARIABLE): >60 ML/MIN/1.73 M^2
GLUCOSE SERPL-MCNC: 118 MG/DL (ref 70–110)
HAV IGM SERPL QL IA: NORMAL
HBV CORE IGM SERPL QL IA: NORMAL
HBV SURFACE AG SERPL QL IA: NORMAL
HCT VFR BLD AUTO: 38.1 % (ref 37–48.5)
HCV AB SERPL QL IA: NORMAL
HGB BLD-MCNC: 13.3 G/DL (ref 12–16)
IMM GRANULOCYTES # BLD AUTO: 0.05 K/UL (ref 0–0.04)
IMM GRANULOCYTES NFR BLD AUTO: 0.8 % (ref 0–0.5)
INR PPP: 1.4 (ref 0.8–1.2)
LYMPHOCYTES # BLD AUTO: 1.2 K/UL (ref 1–4.8)
LYMPHOCYTES NFR BLD: 18.9 % (ref 18–48)
MCH RBC QN AUTO: 27.8 PG (ref 27–31)
MCHC RBC AUTO-ENTMCNC: 34.9 G/DL (ref 32–36)
MCV RBC AUTO: 80 FL (ref 82–98)
MONOCYTES # BLD AUTO: 0.1 K/UL (ref 0.3–1)
MONOCYTES NFR BLD: 1.7 % (ref 4–15)
NEUTROPHILS # BLD AUTO: 5 K/UL (ref 1.8–7.7)
NEUTROPHILS NFR BLD: 77.8 % (ref 38–73)
NRBC BLD-RTO: 0 /100 WBC
PLATELET # BLD AUTO: 367 K/UL (ref 150–450)
PMV BLD AUTO: 9.8 FL (ref 9.2–12.9)
POTASSIUM SERPL-SCNC: 3.8 MMOL/L (ref 3.5–5.1)
PROT SERPL-MCNC: 5.7 G/DL (ref 6–8.4)
PROTHROMBIN TIME: 15.5 SEC (ref 9–12.5)
RBC # BLD AUTO: 4.78 M/UL (ref 4–5.4)
SODIUM SERPL-SCNC: 139 MMOL/L (ref 136–145)
WBC # BLD AUTO: 6.46 K/UL (ref 3.9–12.7)

## 2025-02-25 PROCEDURE — 85610 PROTHROMBIN TIME: CPT | Performed by: INTERNAL MEDICINE

## 2025-02-25 PROCEDURE — 21400001 HC TELEMETRY ROOM

## 2025-02-25 PROCEDURE — 80053 COMPREHEN METABOLIC PANEL: CPT | Performed by: NURSE PRACTITIONER

## 2025-02-25 PROCEDURE — 25000003 PHARM REV CODE 250: Performed by: NURSE PRACTITIONER

## 2025-02-25 PROCEDURE — 85025 COMPLETE CBC W/AUTO DIFF WBC: CPT | Performed by: NURSE PRACTITIONER

## 2025-02-25 PROCEDURE — 11000001 HC ACUTE MED/SURG PRIVATE ROOM

## 2025-02-25 PROCEDURE — 63600175 PHARM REV CODE 636 W HCPCS: Performed by: INTERNAL MEDICINE

## 2025-02-25 PROCEDURE — 25000003 PHARM REV CODE 250: Performed by: INTERNAL MEDICINE

## 2025-02-25 PROCEDURE — 80074 ACUTE HEPATITIS PANEL: CPT | Performed by: INTERNAL MEDICINE

## 2025-02-25 RX ORDER — TRAMADOL HYDROCHLORIDE 50 MG/1
50 TABLET ORAL EVERY 8 HOURS
Status: DISCONTINUED | OUTPATIENT
Start: 2025-02-25 | End: 2025-02-26 | Stop reason: HOSPADM

## 2025-02-25 RX ORDER — HEPARIN SODIUM 5000 [USP'U]/ML
5000 INJECTION, SOLUTION INTRAVENOUS; SUBCUTANEOUS EVERY 8 HOURS
Status: DISCONTINUED | OUTPATIENT
Start: 2025-02-25 | End: 2025-02-25

## 2025-02-25 RX ADMIN — TRAMADOL HYDROCHLORIDE 50 MG: 50 TABLET, COATED ORAL at 08:02

## 2025-02-25 RX ADMIN — MUPIROCIN: 20 OINTMENT TOPICAL at 08:02

## 2025-02-25 RX ADMIN — OLANZAPINE 5 MG: 5 TABLET, FILM COATED ORAL at 08:02

## 2025-02-25 RX ADMIN — TRAMADOL HYDROCHLORIDE 50 MG: 50 TABLET, COATED ORAL at 02:02

## 2025-02-25 RX ADMIN — MUPIROCIN: 20 OINTMENT TOPICAL at 09:02

## 2025-02-25 RX ADMIN — HEPARIN SODIUM 5000 UNITS: 5000 INJECTION INTRAVENOUS; SUBCUTANEOUS at 02:02

## 2025-02-25 RX ADMIN — TRAMADOL HYDROCHLORIDE 50 MG: 50 TABLET, COATED ORAL at 09:02

## 2025-02-25 RX ADMIN — DIAZEPAM 5 MG: 5 TABLET ORAL at 09:02

## 2025-02-25 RX ADMIN — LISINOPRIL 10 MG: 10 TABLET ORAL at 09:02

## 2025-02-25 RX ADMIN — ONDANSETRON HYDROCHLORIDE 8 MG: 4 TABLET, FILM COATED ORAL at 09:02

## 2025-02-25 RX ADMIN — SERTRALINE HYDROCHLORIDE 50 MG: 50 TABLET ORAL at 09:02

## 2025-02-25 NOTE — SUBJECTIVE & OBJECTIVE
Interval History: NAEON. Pt reports some nausea with chemo. Has persistent R sided abd pain, somewhat controlled with PO tramadol. Urine dark, light stool. Denies CP, SOB.     Review of Systems  Objective:     Vital Signs (Most Recent):  Temp: 97.6 °F (36.4 °C) (02/25/25 0434)  Pulse: 87 (02/25/25 0912)  Resp: 16 (02/25/25 0912)  BP: 110/70 (02/25/25 0434)  SpO2: (!) 94 % (02/25/25 0434) Vital Signs (24h Range):  Temp:  [97.6 °F (36.4 °C)-98.8 °F (37.1 °C)] 97.6 °F (36.4 °C)  Pulse:  [] 87  Resp:  [16-19] 16  SpO2:  [92 %-95 %] 94 %  BP: (109-134)/(64-75) 110/70     Weight: 70.4 kg (155 lb 3.3 oz)  Body mass index is 28.39 kg/m².  No intake or output data in the 24 hours ending 02/25/25 1105      Physical Exam  Vitals and nursing note reviewed.   Constitutional:       General: She is not in acute distress.     Appearance: She is ill-appearing.   Eyes:      General: Scleral icterus present.   Cardiovascular:      Rate and Rhythm: Normal rate and regular rhythm.      Heart sounds: No murmur heard.  Pulmonary:      Effort: Pulmonary effort is normal.      Breath sounds: Normal breath sounds. No wheezing or rales.   Abdominal:      General: There is no distension.      Palpations: Abdomen is soft.      Tenderness: There is abdominal tenderness (RUQ, RLQ). There is no guarding or rebound.   Musculoskeletal:      Right lower leg: No edema.      Left lower leg: No edema.   Skin:     Coloration: Skin is jaundiced.   Neurological:      Mental Status: She is alert. Mental status is at baseline.             Significant Labs: All pertinent labs within the past 24 hours have been reviewed.    Significant Imaging: I have reviewed all pertinent imaging results/findings within the past 24 hours.

## 2025-02-25 NOTE — PLAN OF CARE
Discussed poc with pt, pt verbalized understanding    Purposeful rounding every 2hours    VS monitored as ordered    Cardiac monitoring in use, pt is NSR, tele monitor # [id number]    Fall precautions in place, remains injury free  Pain and nausea under control with PRN meds    PICC infusing NS @ 100 mL/hr  IV saline locked    Accurate I&Os  Abx given as prescribed  Bed locked at lowest position  Call light within reach   at bedside    Chart check complete  Will cont with POC

## 2025-02-25 NOTE — HOSPITAL COURSE
Heme Onc consulted and following   MRCP limited but showed no over obstructive process. Received Docetaxel/Carboplatin on 02/24 per Heme Onc  LFTs remain elevated but stable post chemo. Discussed with Oncology Dr. Ivey who discussed with Dr. Washburn and per Dr. Ivey- pt was deemed ok for discharge even if LFTs were elevated to continue chemotherapy as outpatient if functional status ok.   PT/OT evaluated and pt was independent with ambulation, no further therapy recommended.   IR consulted for port placement, completed 02/26/2025. Pt tolerated without immediate issues.     Pt seen and examined on day of discharge with  at bedside. Reports abd pain is controlled with PO tramadol, no further nausea/vomiting. Appears stable for discharge home today per my exam with close followup with Heme Onc as outpatient for continued care. Followup with PCP within 3-5 days as well.

## 2025-02-25 NOTE — PLAN OF CARE
Pt AAOx4. PICC to RUE. Independent with ADL's. First chemo adm 2/24/25, voices no side effects at this time. Heart monitor 8638. PRN pain meds adm as needed. Remains free from incident/injury. Call light in reach. All active orders reviewed. Chart check complete.

## 2025-02-25 NOTE — PROGRESS NOTES
[facility] Medicine  Progress Note    Patient Name: Serina Barriga  MRN: [id number]  Patient Class: IP- Inpatient   Admission Date: 2/24/2025  Length of Stay: 1 days  Attending Physician: Kimberly Segovia MD  Primary Care Provider: Obed Alford MD        Subjective     Principal Problem:Transaminitis        HPI:  Serina Barriga is a 49 year old female who  has a past medical history of Depression, Fibroid, and Hypertension who presented with her  for evaluation of worsening jaundice. She saw Dr. Lopez on 2/19 to discuss tx as she was recently diagnosed with breast cancer. Today she contacted her PCP as she was more jaundiced today and urine is darker. PCP advised them to go to the ER. ED workup showed WBC count 11.61, Hgb/Hct 14.6/43.6, T bili 22.4, Alk phos 697, AST//100, INR 1.3, Direct bilirubin >14.0. PET scan on 2/21/25 showed extensive hepatic metastatic disease with innumerable metastatic masses throughout the liver all of which are markedly FDG avid. FDG avid metastatic left para-aortic retroperitoneal lymph node in the upper abdomen. Markedly FDG avid biopsy-proven multicentric left breast cancer with metastatic level 1 and level 3 left axillary lymphadenopathy. ED dicussed case with Hematology who recommends initiating chemotherapy for visceral crisis. Pt admitted for Transaminitis.     Overview/Hospital Course:  Heme Onc consulted and following   MRCP limited but showed no over obstructive process. Received Docetaxel/Carboplatin on 02/24 per Heme Onc  IR consulted for port placement.     Interval History: NAEON. Pt reports some nausea with chemo. Has persistent R sided abd pain, somewhat controlled with PO tramadol. Urine dark, light stool. Denies CP, SOB.     Review of Systems  Objective:     Vital Signs (Most Recent):  Temp: 97.6 °F (36.4 °C) (02/25/25 0434)  Pulse: 87 (02/25/25 0912)  Resp: 16 (02/25/25 0912)  BP: 110/70 (02/25/25 0434)  SpO2: (!) 94 %  (02/25/25 0514) Vital Signs (24h Range):  Temp:  [97.6 °F (36.4 °C)-98.8 °F (37.1 °C)] 97.6 °F (36.4 °C)  Pulse:  [] 87  Resp:  [16-19] 16  SpO2:  [92 %-95 %] 94 %  BP: (109-134)/(64-75) 110/70     Weight: 70.4 kg (155 lb 3.3 oz)  Body mass index is 28.39 kg/m².  No intake or output data in the 24 hours ending 02/25/25 1105      Physical Exam  Vitals and nursing note reviewed.   Constitutional:       General: She is not in acute distress.     Appearance: She is ill-appearing.   Eyes:      General: Scleral icterus present.   Cardiovascular:      Rate and Rhythm: Normal rate and regular rhythm.      Heart sounds: No murmur heard.  Pulmonary:      Effort: Pulmonary effort is normal.      Breath sounds: Normal breath sounds. No wheezing or rales.   Abdominal:      General: There is no distension.      Palpations: Abdomen is soft.      Tenderness: There is abdominal tenderness (RUQ, RLQ). There is no guarding or rebound.   Musculoskeletal:      Right lower leg: No edema.      Left lower leg: No edema.   Skin:     Coloration: Skin is jaundiced.   Neurological:      Mental Status: She is alert. Mental status is at baseline.             Significant Labs: All pertinent labs within the past 24 hours have been reviewed.    Significant Imaging: I have reviewed all pertinent imaging results/findings within the past 24 hours.    Assessment and Plan     * Transaminitis  49 year old female with a PMHx of metastatic breast cancer admitted for Transaminitis (T bili 22.4, AST//100). PET CT on 2/21 extensive hepatic metastatic disease with innumerable metastatic masses throughout the liver all of which are markedly FDG avid. FDG avid metastatic left para-aortic retroperitoneal lymph node in the upper abdomen. Markedly FDG avid biopsy-proven multicentric left breast cancer with metastatic level 1 and level 3 left axillary lymphadenopathy. ED discussed case with Hematology/Oncology concerned for visceral crisis, would like  to admit to initiate chemotherapy.    PLAN:   -acute hepatitis panel and apap levels wnl   -Hematology/Oncology consulted to initiate chemotherapy, s/p Docetaxel/Carboplatin 02/24/25  -MRCP limited but showed no evidence of obstruction   - stop IV fluids   - monitor CmP       Malignant neoplasm of overlapping sites of left breast in female, estrogen receptor negative  Heme Onc following   S/p  Docetaxel/Carboplatin 02/24/25  as above  PICC placed on admission, IR consult for port placement   Discussed with Dr. Lopez- she would like to monitor LFTs one additional day, has scheduled PO tramadol for pain control     Primary hypertension  Patient's blood pressure range in the last 24 hours was: BP  Min: 117/64  Max: 142/78.The patient's inpatient anti-hypertensive regimen is listed below:  Current Antihypertensives  lisinopriL tablet 10 mg, Daily, Oral    Plan  - BP is controlled, no changes needed to their regimen    Moderate episode of recurrent major depressive disorder  -Mood stable  -Continue Zoloft        VTE Risk Mitigation (From admission, onward)           Ordered     heparin (porcine) injection 5,000 Units  Every 8 hours         02/25/25 1117     heparin, porcine (PF) 100 unit/mL injection flush 500 Units  As needed (PRN)         02/24/25 1432     IP VTE HIGH RISK PATIENT  Once         02/24/25 1201     Place sequential compression device  Until discontinued         02/24/25 1201                    Discharge Planning   ESTHELA:      Code Status: Full Code   Medical Readiness for Discharge Date:   Discharge Plan A: Home with family                        Kimberly Segovia MD  Department of Hospital Medicine   [facility]

## 2025-02-25 NOTE — PLAN OF CARE
Pt received from ED    PICC line in place. Starting chemo this evening    Cardiac monitoring in use  Fall precautions in place, remains injury free  Pain and nausea under control with PRN meds    IVFs  Accurate I&Os  Bed locked at lowest position  Call light within reach    Chart check complete  Will cont with POC

## 2025-02-25 NOTE — ASSESSMENT & PLAN NOTE
49 year old female with a PMHx of metastatic breast cancer admitted for Transaminitis (T bili 22.4, AST//100). PET CT on 2/21 extensive hepatic metastatic disease with innumerable metastatic masses throughout the liver all of which are markedly FDG avid. FDG avid metastatic left para-aortic retroperitoneal lymph node in the upper abdomen. Markedly FDG avid biopsy-proven multicentric left breast cancer with metastatic level 1 and level 3 left axillary lymphadenopathy. ED discussed case with Hematology/Oncology concerned for visceral crisis, would like to admit to initiate chemotherapy.    PLAN:   -acute hepatitis panel and apap levels wnl   -Hematology/Oncology consulted to initiate chemotherapy, s/p Docetaxel/Carboplatin 02/24/25  -MRCP limited but showed no evidence of obstruction   - stop IV fluids   - monitor CmP

## 2025-02-25 NOTE — ASSESSMENT & PLAN NOTE
Heme Onc following   S/p  Docetaxel/Carboplatin 02/24/25  as above  PICC placed on admission, IR consult for port placement   Discussed with Dr. Lopez- she would like to monitor LFTs one additional day, has scheduled PO tramadol for pain control

## 2025-02-25 NOTE — PLAN OF CARE
[facility]  Initial Discharge Assessment       Primary Care Provider: Obed Alford MD    Admission Diagnosis: Hyperbilirubinemia [E80.6]  Metastasis to liver [C78.7]  Transaminitis [R74.01]  S/P PICC central line placement [Z95.828]  Neoplasm of left breast [D49.3]  Malignant neoplasm of overlapping sites of left breast in female, estrogen receptor negative [C50.812, Z17.1]    Admission Date: 2/24/2025  Expected Discharge Date: Per Attending     Transition of Care Barriers: None    Payor: BLUE CROSS BLUE [employer] / Plan[facility] [place] [facility] / Product Type: Commercial /     Extended Emergency Contact Information  Primary Emergency Contact: Bakari Barriga  Address: [address] [place] [address]           [place] [address] [facility]  Home Phone: [phone]  Mobile Phone: [phone]  Relation: Spouse    Discharge Plan A: Home with family         [id number] [place] - [address] [facility]  [address] [facility]  [place] [address]  Phone: [phone] Fax: [phone]      Initial Assessment (most recent)       Adult Discharge Assessment - 02/25/25 1056          Discharge Assessment    Assessment Type Discharge Planning Assessment     Confirmed/corrected address, phone number and insurance Yes     Confirmed Demographics Correct on Facesheet     Source of Information patient;family     Communicated ESTHELA with patient/caregiver Date not available/Unable to determine     Reason For Admission transaminitis     People in Home child(renato), adult;spouse     Do you expect to return to your current living situation? Yes     Do you have help at home or someone to help you manage your care at home? Yes     Who are your caregiver(s) and their phone number(s)? family     Prior to hospitilization cognitive status: Alert/Oriented     Current cognitive status: Alert/Oriented     Walking or Climbing Stairs Difficulty no     Dressing/Bathing Difficulty no     Home Layout Able to live on 1st  floor     Equipment Currently Used at Home none     Readmission within 30 days? No     Patient currently being followed by outpatient case management? No     Do you currently have service(s) that help you manage your care at home? No     Do you take prescription medications? Yes     Do you have prescription coverage? Yes     Coverage BCBS     Do you have any problems affording any of your prescribed medications? No     Is the patient taking medications as prescribed? yes     Who is going to help you get home at discharge? spouse     How do you get to doctors appointments? car, drives self;family or friend will provide     Are you on dialysis? No     Do you take coumadin? No     Discharge Plan A Home with family     DME Needed Upon Discharge  none     Discharge Plan discussed with: Patient;Spouse/sig other;Parent(s)     Transition of Care Barriers None                   Anticipated DC dispo: Home   Prior Level of Function: Independent   People in home:  Spouse and adult dtr     Comments:  CM met with patient at bedside to introduce role and discuss discharge planning. Spouse  will be help at home and can provide transport at time of discharge. CM discharge needs depends on hospital progress. CM will continue following to assist with other needs.

## 2025-02-25 NOTE — CONSULTS
Chart reviewed by Dr. Mitchell.       ASSESSMENT/PLAN:    Chemo    The order for a port placement has been placed and the procedure will be performed asap.          Thank you for the consult.

## 2025-02-26 VITALS
WEIGHT: 155.19 LBS | RESPIRATION RATE: 18 BRPM | TEMPERATURE: 98 F | OXYGEN SATURATION: 92 % | HEIGHT: 62 IN | BODY MASS INDEX: 28.56 KG/M2 | SYSTOLIC BLOOD PRESSURE: 101 MMHG | DIASTOLIC BLOOD PRESSURE: 56 MMHG | HEART RATE: 91 BPM

## 2025-02-26 DIAGNOSIS — C50.812 MALIGNANT NEOPLASM OF OVERLAPPING SITES OF LEFT BREAST IN FEMALE, ESTROGEN RECEPTOR NEGATIVE: Primary | ICD-10-CM

## 2025-02-26 DIAGNOSIS — Z17.1 MALIGNANT NEOPLASM OF OVERLAPPING SITES OF LEFT BREAST IN FEMALE, ESTROGEN RECEPTOR NEGATIVE: Primary | ICD-10-CM

## 2025-02-26 LAB
ALBUMIN SERPL BCP-MCNC: 2.2 G/DL (ref 3.5–5.2)
ALP SERPL-CCNC: 570 U/L (ref 40–150)
ALT SERPL W/O P-5'-P-CCNC: 95 U/L (ref 10–44)
ANION GAP SERPL CALC-SCNC: 8 MMOL/L (ref 8–16)
AST SERPL-CCNC: 425 U/L (ref 10–40)
B-HCG UR QL: NEGATIVE
BASOPHILS # BLD AUTO: 0.01 K/UL (ref 0–0.2)
BASOPHILS NFR BLD: 0.1 % (ref 0–1.9)
BILIRUB SERPL-MCNC: 21.3 MG/DL (ref 0.1–1)
BUN SERPL-MCNC: 24 MG/DL (ref 6–20)
CALCIUM SERPL-MCNC: 7.9 MG/DL (ref 8.7–10.5)
CHLORIDE SERPL-SCNC: 108 MMOL/L (ref 95–110)
CO2 SERPL-SCNC: 23 MMOL/L (ref 23–29)
CREAT SERPL-MCNC: 0.9 MG/DL (ref 0.5–1.4)
CTP QC/QA: YES
DIFFERENTIAL METHOD BLD: ABNORMAL
EOSINOPHIL # BLD AUTO: 0 K/UL (ref 0–0.5)
EOSINOPHIL NFR BLD: 0 % (ref 0–8)
ERYTHROCYTE [DISTWIDTH] IN BLOOD BY AUTOMATED COUNT: 20.9 % (ref 11.5–14.5)
EST. GFR  (NO RACE VARIABLE): >60 ML/MIN/1.73 M^2
GLUCOSE SERPL-MCNC: 135 MG/DL (ref 70–110)
HCT VFR BLD AUTO: 39.1 % (ref 37–48.5)
HGB BLD-MCNC: 13.3 G/DL (ref 12–16)
IMM GRANULOCYTES # BLD AUTO: 0.07 K/UL (ref 0–0.04)
IMM GRANULOCYTES NFR BLD AUTO: 0.6 % (ref 0–0.5)
INR PPP: 1.7 (ref 0.8–1.2)
LYMPHOCYTES # BLD AUTO: 0.7 K/UL (ref 1–4.8)
LYMPHOCYTES NFR BLD: 6 % (ref 18–48)
MCH RBC QN AUTO: 27.1 PG (ref 27–31)
MCHC RBC AUTO-ENTMCNC: 34 G/DL (ref 32–36)
MCV RBC AUTO: 80 FL (ref 82–98)
MONOCYTES # BLD AUTO: 0.4 K/UL (ref 0.3–1)
MONOCYTES NFR BLD: 3.4 % (ref 4–15)
NEUTROPHILS # BLD AUTO: 10.2 K/UL (ref 1.8–7.7)
NEUTROPHILS NFR BLD: 89.9 % (ref 38–73)
NRBC BLD-RTO: 0 /100 WBC
PLATELET # BLD AUTO: 344 K/UL (ref 150–450)
PMV BLD AUTO: 10.4 FL (ref 9.2–12.9)
POTASSIUM SERPL-SCNC: 3.8 MMOL/L (ref 3.5–5.1)
PROT SERPL-MCNC: 5.9 G/DL (ref 6–8.4)
PROTHROMBIN TIME: 18.1 SEC (ref 9–12.5)
RBC # BLD AUTO: 4.9 M/UL (ref 4–5.4)
SODIUM SERPL-SCNC: 139 MMOL/L (ref 136–145)
WBC # BLD AUTO: 11.32 K/UL (ref 3.9–12.7)

## 2025-02-26 PROCEDURE — 85025 COMPLETE CBC W/AUTO DIFF WBC: CPT | Performed by: INTERNAL MEDICINE

## 2025-02-26 PROCEDURE — 97161 PT EVAL LOW COMPLEX 20 MIN: CPT

## 2025-02-26 PROCEDURE — 0JH60WZ INSERTION OF TOTALLY IMPLANTABLE VASCULAR ACCESS DEVICE INTO CHEST SUBCUTANEOUS TISSUE AND FASCIA, OPEN APPROACH: ICD-10-PCS | Performed by: RADIOLOGY

## 2025-02-26 PROCEDURE — 63600175 PHARM REV CODE 636 W HCPCS: Performed by: RADIOLOGY

## 2025-02-26 PROCEDURE — 25000003 PHARM REV CODE 250: Performed by: NURSE PRACTITIONER

## 2025-02-26 PROCEDURE — 81025 URINE PREGNANCY TEST: CPT | Performed by: INTERNAL MEDICINE

## 2025-02-26 PROCEDURE — 85610 PROTHROMBIN TIME: CPT | Performed by: INTERNAL MEDICINE

## 2025-02-26 PROCEDURE — 02HV33Z INSERTION OF INFUSION DEVICE INTO SUPERIOR VENA CAVA, PERCUTANEOUS APPROACH: ICD-10-PCS | Performed by: RADIOLOGY

## 2025-02-26 PROCEDURE — 80053 COMPREHEN METABOLIC PANEL: CPT | Performed by: INTERNAL MEDICINE

## 2025-02-26 PROCEDURE — 25000003 PHARM REV CODE 250: Performed by: INTERNAL MEDICINE

## 2025-02-26 RX ORDER — MIDAZOLAM HYDROCHLORIDE 1 MG/ML
INJECTION, SOLUTION INTRAMUSCULAR; INTRAVENOUS CODE/TRAUMA/SEDATION MEDICATION
Status: COMPLETED | OUTPATIENT
Start: 2025-02-26 | End: 2025-02-26

## 2025-02-26 RX ORDER — ONDANSETRON 4 MG/1
4 TABLET, ORALLY DISINTEGRATING ORAL EVERY 8 HOURS PRN
Qty: 21 TABLET | Refills: 0 | Status: SHIPPED | OUTPATIENT
Start: 2025-02-26 | End: 2025-03-05

## 2025-02-26 RX ORDER — ONDANSETRON 4 MG/1
4 TABLET, ORALLY DISINTEGRATING ORAL EVERY 8 HOURS PRN
Qty: 21 TABLET | Refills: 0 | Status: SHIPPED | OUTPATIENT
Start: 2025-02-26 | End: 2025-02-26

## 2025-02-26 RX ORDER — HEPARIN 100 UNIT/ML
SYRINGE INTRAVENOUS CODE/TRAUMA/SEDATION MEDICATION
Status: COMPLETED | OUTPATIENT
Start: 2025-02-26 | End: 2025-02-26

## 2025-02-26 RX ORDER — OLANZAPINE 5 MG/1
5 TABLET ORAL NIGHTLY
Qty: 30 TABLET | Refills: 3 | Status: SHIPPED | OUTPATIENT
Start: 2025-02-26 | End: 2025-06-26

## 2025-02-26 RX ORDER — LIDOCAINE AND PRILOCAINE 25; 25 MG/G; MG/G
CREAM TOPICAL
Qty: 30 G | Refills: 1 | Status: SHIPPED | OUTPATIENT
Start: 2025-02-26

## 2025-02-26 RX ORDER — LIDOCAINE HYDROCHLORIDE 20 MG/ML
INJECTION, SOLUTION EPIDURAL; INFILTRATION; INTRACAUDAL; PERINEURAL CODE/TRAUMA/SEDATION MEDICATION
Status: COMPLETED | OUTPATIENT
Start: 2025-02-26 | End: 2025-02-26

## 2025-02-26 RX ORDER — CEFAZOLIN SODIUM 1 G/3ML
INJECTION, POWDER, FOR SOLUTION INTRAMUSCULAR; INTRAVENOUS CODE/TRAUMA/SEDATION MEDICATION
Status: COMPLETED | OUTPATIENT
Start: 2025-02-26 | End: 2025-02-26

## 2025-02-26 RX ORDER — FENTANYL CITRATE 50 UG/ML
INJECTION, SOLUTION INTRAMUSCULAR; INTRAVENOUS CODE/TRAUMA/SEDATION MEDICATION
Status: COMPLETED | OUTPATIENT
Start: 2025-02-26 | End: 2025-02-26

## 2025-02-26 RX ADMIN — FENTANYL CITRATE 50 MCG: 50 INJECTION, SOLUTION INTRAMUSCULAR; INTRAVENOUS at 10:02

## 2025-02-26 RX ADMIN — MUPIROCIN: 20 OINTMENT TOPICAL at 08:02

## 2025-02-26 RX ADMIN — MIDAZOLAM 1 MG: 1 INJECTION INTRAMUSCULAR; INTRAVENOUS at 10:02

## 2025-02-26 RX ADMIN — LIDOCAINE HYDROCHLORIDE 5 ML: 20 INJECTION, SOLUTION EPIDURAL; INFILTRATION; INTRACAUDAL; PERINEURAL at 10:02

## 2025-02-26 RX ADMIN — SERTRALINE HYDROCHLORIDE 50 MG: 50 TABLET ORAL at 08:02

## 2025-02-26 RX ADMIN — ONDANSETRON HYDROCHLORIDE 8 MG: 4 TABLET, FILM COATED ORAL at 08:02

## 2025-02-26 RX ADMIN — DIAZEPAM 5 MG: 5 TABLET ORAL at 08:02

## 2025-02-26 RX ADMIN — LISINOPRIL 10 MG: 10 TABLET ORAL at 08:02

## 2025-02-26 RX ADMIN — CEFAZOLIN 2 G: 330 INJECTION, POWDER, FOR SOLUTION INTRAMUSCULAR; INTRAVENOUS at 10:02

## 2025-02-26 RX ADMIN — HEPARIN 400 UNITS: 100 SYRINGE at 10:02

## 2025-02-26 RX ADMIN — TRAMADOL HYDROCHLORIDE 50 MG: 50 TABLET, COATED ORAL at 06:02

## 2025-02-26 NOTE — DISCHARGE INSTRUCTIONS
Please return to ER if any of these symptoms occur:  Fever over 101 degrees,  Bleeding from the puncture site not controlled, (Hold pressure for 5 minutes, if bleeding does not stop then go to the ER.)  Pain not controlled with Aleve or Tylenol,    No driving for 24 hours after procedure due to sedation given during procedure.     Do not lift anything heavy, nothing over the size of a gallon of milk, for at least 2 days.    Do not submerge in standing water until approved by medical staff.    Resume home medications and diet    Please follow up with Dr. Lopez for results and any other questions or concerns that you may have.

## 2025-02-26 NOTE — ASSESSMENT & PLAN NOTE
Heme Onc following   S/p  Docetaxel/Carboplatin 02/24/25  as above  Port placed 02/26/25 per IR   PRN tramadol for pain, PRN ODT Zofran for nausea   Outpatient Onc followup for further management

## 2025-02-26 NOTE — PT/OT/SLP EVAL
Physical Therapy Evaluation and Discharge Note    Patient Name:  Serina Barriga   MRN:  [id number]    Recommendations:     Discharge Recommendations: No Therapy Indicated  Discharge Equipment Recommendations: none   Barriers to discharge: None    Assessment:     Serina Barriga is a 50 y.o. female admitted with a medical diagnosis of Transaminitis. .  At this time, patient is functioning at their prior level of function and does not require further acute PT services.     Recent Surgery: * No surgery found *      Plan:     During this hospitalization, patient does not require further acute PT services.  Please re-consult if situation changes.      Subjective     Chief Complaint: Pt is motivated to participate  Patient/Family Comments/goals: none stated  Pain/Comfort:  Pain Rating 1: 0/10    Patients cultural, spiritual, Sabianist conflicts given the current situation: no    Living Environment:  Pt reports living with her  and adult child in a 1 story home, threshold to enter.  Prior to admission, patients level of function was INDEP, driving, working at [employer], household and community ambulation.  Equipment used at home: none.  DME owned (not currently used): none.  Upon discharge, patient will have assistance from FAMILY.    Objective:     Communicated with nurse and epic chart review prior to session.  Patient found HOB elevated with PICC line, telemetry upon PT entry to room.    General Precautions: Standard, fall    Orthopedic Precautions:N/A   Braces: N/A  Respiratory Status: Room air    Exams:  Cognitive Exam:  Patient is oriented to Person, Place, Time, and Situation  Postural Exam:  Patient presented with the following abnormalities:    -       No postural abnormalities identified  Sensation:    -       Intact  Skin Integrity/Edema:      -       Skin integrity: Visible skin intact  RLE ROM: WFL  RLE Strength: WFL  LLE ROM: WFL  LLE Strength: WFL    Functional Mobility:  Gait Belt Applied -  "Yes   Socks/Shoes Donned - Yes  Bed Mobility  Rolling Right: independence  Scooting: independence  Supine to Sit: independence  Sit to Supine: independence  Transfers  Sit to Stand: independence with no AD  Gait  Patient ambulated 440ft with no AD and independence. Patient demonstrates steady gait. No c/o dizziness or SOB. All lines remained intact throughout ambulation trial.  Balance  Sitting: independence  Standing: independence    AM-PAC 6 CLICK MOBILITY  Total Score:21       Treatment and Education:  Pt educated on role of PT in acute care. Educated on importance of OOB activities, activity pacing, and HEP (marching/hip flex, hip abd, heel slides/LAQ, quad sets, ankle pumps) in order to maintain/regain strength. Encouraged to sit up in chair for all meals. Educated on "call don't fall" policy and increased risk of falling due to weakness, instructed to utilize call bell for assistance with all transfers. Pt agreeable to all requests.    AM-PAC 6 CLICK MOBILITY  Total Score:21     Patient left HOB elevated with all lines intact, call button in reach, and family present.    GOALS:   Multidisciplinary Problems       Physical Therapy Goals       Not on file                    DME Justifications:  No DME recommended requiring DME justifications    History:     Past Medical History:   Diagnosis Date    Depression     controlled with meds    Fibroid December 2, 2022    Hypertension     It's controlled with meds.       Past Surgical History:   Procedure Laterality Date    FUNCTIONAL ENDOSCOPIC SINUS SURGERY (FESS) USING COMPUTER-ASSISTED NAVIGATION  02/2022       Time Tracking:     PT Received On: 02/26/25  PT Start Time: 0955     PT Stop Time: 1010  PT Total Time (min): 15 min     Billable Minutes: Evaluation 15min      02/26/2025  "

## 2025-02-26 NOTE — DISCHARGE SUMMARY
[facility] - Med Surg  Discharge Note  Short Stay  Pre Op Diagnosis: Breast Ca     Post Op Diagnosis: same     Procedure:  Mediport placement     Procedure performed by: Lillie FARIAS     Written Informed Consent Obtained: Yes       The patient tolerated the procedure well and there were no complications.      Disposition:  F/U in clinic or with ordering physician    Discharge instructions:  Light activity for 24 hours.  Remove band aid in 24 hours.  No baths (showers are appropriate).      Sterile technique was performed in the Anterior chest, lidocaine was used as a local anesthetic.  Successful Mediport placement .  Pt tolerated the procedure well without immediate complications.  Please see radiologist report for details. F/u with PCP and/or ordering physician.

## 2025-02-26 NOTE — INTERVAL H&P NOTE
The patient has been examined and the H&P has been reviewed:            Active Hospital Problems    Diagnosis  POA    *Transaminitis [R74.01]  Yes    Malignant neoplasm of overlapping sites of left breast in female, estrogen receptor negative [C50.812, Z17.1]  Not Applicable    Moderate episode of recurrent major depressive disorder [F33.1]  Yes    Primary hypertension [I10]  Yes      Resolved Hospital Problems   No resolved problems to display.

## 2025-02-26 NOTE — NURSING
Pt discharged home    PICC removed    Tele monitor removed    AVS reveiwed    New Rx reviewed    F/U appts discussed    NAD

## 2025-02-26 NOTE — PLAN OF CARE
[facility] - Med [facility]urg  Discharge Final Note    Primary Care Provider: Obed Alford MD    Expected Discharge Date: 2/26/2025    Final Discharge Note (most recent)       Final Note - 02/26/25 1100          Final Note    Assessment Type Final Discharge Note     Anticipated Discharge Disposition Home or Self Care        Post-Acute Status    Discharge Delays None known at this time                     Contact Info       Obed Alford MD   Specialty: Pediatrics   Relationship: PCP - General    [facility]  [address] [address]   Phone: [phone]       Next Steps: Schedule an appointment as soon as possible for a visit in 3 day(s)    Niyah Lopez MD   Specialty: Hematology and Oncology   Relationship: Consulting Physician    [address]  [place] [address]   Phone: [phone]       Next Steps: Schedule an appointment as soon as possible for a visit in 3 day(s)          Patient has no d/c needs at this time. Sw to follow up, as needed, for d/c planning purposes.

## 2025-02-26 NOTE — PLAN OF CARE
PT EVAL complete. Pt INDEP with all mobility, ambulated 440ft INDEP, no AD. During this hospitalization, patient does not require further acute PT services.  Please re-consult if situation changes. No therapy indicated upon d/c.

## 2025-02-26 NOTE — H&P (VIEW-ONLY)
"Medical Oncology Consult Note    HPI Attempted to use virtual consult system, but 5th floor is "offline". History obtained from chart check.    49 year old female with recent diagnosis of metastatic breast CA who presented for evaluation of worsening jaundice. She saw Dr. Lopez on 2/19 to discuss tx. She called PCP complaining of darker urine and jaundiced eyes and was told to go to ED. ED workup showed T bili 22.4, Alk phos 697, AST//100, INR 1.3, Direct bilirubin >14.0. PET scan on 2/21/25 showed extensive hepatic metastatic disease with innumerable metastatic masses throughout the liver all of which are markedly FDG avid. FDG avid metastatic left para-aortic retroperitoneal lymph node in the upper abdomen. Discussed case with Dr. Lopez and patient was admitted for inpatient chemotherapy.     Labs:  CMP  Sodium   Date Value Ref Range Status   02/26/2025 139 136 - 145 mmol/L Final     Potassium   Date Value Ref Range Status   02/26/2025 3.8 3.5 - 5.1 mmol/L Final     Chloride   Date Value Ref Range Status   02/26/2025 108 95 - 110 mmol/L Final     CO2   Date Value Ref Range Status   02/26/2025 23 23 - 29 mmol/L Final     Glucose   Date Value Ref Range Status   02/26/2025 135 (H) 70 - 110 mg/dL Final     BUN   Date Value Ref Range Status   02/26/2025 24 (H) 6 - 20 mg/dL Final     Creatinine   Date Value Ref Range Status   02/26/2025 0.9 0.5 - 1.4 mg/dL Final     Calcium   Date Value Ref Range Status   02/26/2025 7.9 (L) 8.7 - 10.5 mg/dL Final     Total Protein   Date Value Ref Range Status   02/26/2025 5.9 (L) 6.0 - 8.4 g/dL Final     Albumin   Date Value Ref Range Status   02/26/2025 2.2 (L) 3.5 - 5.2 g/dL Final     Total Bilirubin   Date Value Ref Range Status   02/26/2025 21.3 (H) 0.1 - 1.0 mg/dL Final     Comment:     For infants and newborns, interpretation of results should be based  on gestational age, weight and in agreement with clinical  observations.    Premature Infant recommended reference " ranges:  Up to 24 hours.............<8.0 mg/dL  Up to 48 hours............<12.0 mg/dL  3-5 days..................<15.0 mg/dL  6-29 days.................<15.0 mg/dL       Alkaline Phosphatase   Date Value Ref Range Status   02/26/2025 570 (H) 40 - 150 U/L Final     AST   Date Value Ref Range Status   02/26/2025 425 (H) 10 - 40 U/L Final     ALT   Date Value Ref Range Status   02/26/2025 95 (H) 10 - 44 U/L Final     Anion Gap   Date Value Ref Range Status   02/26/2025 8 8 - 16 mmol/L Final     eGFR   Date Value Ref Range Status   02/26/2025 >60 >60 mL/min/1.73 m^2 Final     Lab Results   Component Value Date    WBC 11.32 02/26/2025    RBC 4.90 02/26/2025    HGB 13.3 02/26/2025    HCT 39.1 02/26/2025    MCV 80 (L) 02/26/2025    MCH 27.1 02/26/2025    MCHC 34.0 02/26/2025    RDW 20.9 (H) 02/26/2025     02/26/2025    MPV 10.4 02/26/2025    GRAN 10.2 (H) 02/26/2025    GRAN 89.9 (H) 02/26/2025    LYMPH 0.7 (L) 02/26/2025    LYMPH 6.0 (L) 02/26/2025    MONO 0.4 02/26/2025    MONO 3.4 (L) 02/26/2025    EOS 0.0 02/26/2025    BASO 0.01 02/26/2025    EOSINOPHIL 0.0 02/26/2025    BASOPHIL 0.1 02/26/2025     Assessment and Plan    Metastatic HER-2 positive breast cancer    Patient presented with visceral crisis, so was admitted for C1 chemotherapy inpatient. Received carboplatin and docetaxel on 2/24 afternoon. Her LFTs have not improved, but stable.    - recommend PT consult to assess rehab needs. If patient is able to go home, she can get future cycles as an outpatient.    Vahe Ivey MD  Medical Oncology  Director of the [facility]  [facility]

## 2025-02-26 NOTE — DISCHARGE SUMMARY
[facility] Medicine  Discharge Summary      Patient Name: Serina Barriga  MRN: [id number]  FRAN: [id number]  Patient Class: IP- Inpatient  Admission Date: 2/24/2025  Hospital Length of Stay: 2 days  Discharge Date and Time:  02/26/2025 1:46 PM  Attending Physician: Kimberly Segovia MD   Discharging Provider: Kimberly Segovia MD  Primary Care Provider: Obed Alford MD    Primary Care Team: Networked reference to record PCT     HPI:   Serina Barriga is a 49 year old female who  has a past medical history of Depression, Fibroid, and Hypertension who presented with her  for evaluation of worsening jaundice. She saw Dr. Lopez on 2/19 to discuss tx as she was recently diagnosed with breast cancer. Today she contacted her PCP as she was more jaundiced today and urine is darker. PCP advised them to go to the ER. ED workup showed WBC count 11.61, Hgb/Hct 14.6/43.6, T bili 22.4, Alk phos 697, AST//100, INR 1.3, Direct bilirubin >14.0. PET scan on 2/21/25 showed extensive hepatic metastatic disease with innumerable metastatic masses throughout the liver all of which are markedly FDG avid. FDG avid metastatic left para-aortic retroperitoneal lymph node in the upper abdomen. Markedly FDG avid biopsy-proven multicentric left breast cancer with metastatic level 1 and level 3 left axillary lymphadenopathy. ED dicussed case with Hematology who recommends initiating chemotherapy for visceral crisis. Pt admitted for Transaminitis.     * No surgery found *      Hospital Course:   Heme Onc consulted and following   MRCP limited but showed no over obstructive process. Received Docetaxel/Carboplatin on 02/24 per Heme Onc  LFTs remain elevated but stable post chemo. Discussed with Oncology Dr. Ivey who discussed with Dr. Washburn and per Dr. Ivey- pt was deemed ok for discharge even if LFTs were elevated to continue chemotherapy as outpatient if functional status ok.   PT/OT evaluated and  pt was independent with ambulation, no further therapy recommended.   IR consulted for port placement, completed 02/26/2025. Pt tolerated without immediate issues.     Pt seen and examined on day of discharge with  at bedside. Reports abd pain is controlled with PO tramadol, no further nausea/vomiting. Appears stable for discharge home today per my exam with close followup with Heme Onc as outpatient for continued care. Followup with PCP within 3-5 days as well.      Goals of Care Treatment Preferences:  Code Status: Full Code      SDOH Screening:  The patient was screened for utility difficulties, food insecurity, transport difficulties, housing insecurity, and interpersonal safety and there were no concerns identified this admission.     Consults:   Consults (From admission, onward)          Status Ordering Provider     Inpatient consult to Interventional Radiology  Once        Provider:  Duke Mitchell MD    Completed FANNIE DOUGHERTY     Inpatient consult to Hematology/Oncology  Once        Provider:  Niyah Lopez MD    Completed RUBY LAURENT     Inpatient consult to PICC team ([facility])  Once        Provider:  (Not yet assigned)    Acknowledged RUBY LAURENT            * Transaminitis  49 year old female with a PMHx of metastatic breast cancer admitted for Transaminitis (T bili 22.4, AST//100). PET CT on 2/21 extensive hepatic metastatic disease with innumerable metastatic masses throughout the liver all of which are markedly FDG avid. FDG avid metastatic left para-aortic retroperitoneal lymph node in the upper abdomen. Markedly FDG avid biopsy-proven multicentric left breast cancer with metastatic level 1 and level 3 left axillary lymphadenopathy. ED discussed case with Hematology/Oncology concerned for visceral crisis, would like to admit to initiate chemotherapy.    PLAN:   -acute hepatitis panel and apap levels wnl   -Hematology/Oncology consulted to initiate chemotherapy, s/p  Docetaxel/Carboplatin 02/24/25  -MRCP limited but showed no evidence of obstruction   - LFTs remain elevated but stable, ok for DC per Heme Onc   - outpatient followup with oncology, PCP       Malignant neoplasm of overlapping sites of left breast in female, estrogen receptor negative  Heme Onc following   S/p  Docetaxel/Carboplatin 02/24/25  as above  Port placed 02/26/25 per IR   PRN tramadol for pain, PRN ODT Zofran for nausea   Outpatient Onc followup for further management     Primary hypertension  Patient's blood pressure range in the last 24 hours was: BP  Min: 117/64  Max: 142/78.The patient's inpatient anti-hypertensive regimen is listed below:  Current Antihypertensives  lisinopriL tablet 10 mg, Daily, Oral    Plan  - BP is controlled, no changes needed to their regimen    Moderate episode of recurrent major depressive disorder  -Mood stable  -Continue Zoloft        Final Active Diagnoses:    Diagnosis Date Noted POA    PRINCIPAL PROBLEM:  Transaminitis [R74.01] 02/21/2025 Yes    Malignant neoplasm of overlapping sites of left breast in female, estrogen receptor negative [C50.812, Z17.1] 02/21/2025 Not Applicable    Moderate episode of recurrent major depressive disorder [F33.1] 11/29/2022 Yes    Primary hypertension [I10] 11/29/2022 Yes      Problems Resolved During this Admission:       Discharged Condition: stable    Disposition: Home or Self Care    Follow Up:   Follow-up Information       Obed Alford MD. Schedule an appointment as soon as possible for a visit in 3 day(s).    Specialty: Pediatrics  Contact information:  [address] [address] [phone]               Niyah Lopez MD. Schedule an appointment as soon as possible for a visit in 3 day(s).    Specialty: Hematology and Oncology  Contact information:  [address]  [place] [address] [phone]                           Patient Instructions:      Activity as tolerated       Significant Diagnostic  Studies:  See Hospital Course     Pending Diagnostic Studies:       Procedure Component Value Units Date/Time    IR Tunneled Cath Placement With Port (XPD) [[id number]] Resulted: 02/26/25 1024    Order Status: Sent Lab Status: In process Updated: 02/26/25 1100           Medications:  Reconciled Home Medications:      Medication List        START taking these medications      ondansetron 4 MG Tbdl  Commonly known as: ZOFRAN-ODT  Take 1 tablet (4 mg total) by mouth every 8 (eight) hours as needed (nausea).            CONTINUE taking these medications      diazePAM 5 MG tablet  Commonly known as: VALIUM  Take 5 mg by mouth every 12 (twelve) hours as needed.     lisinopriL 10 MG tablet  Take 10 mg by mouth once daily.     sertraline 50 MG tablet  Commonly known as: ZOLOFT  Take 50 mg by mouth once daily.     traMADoL 50 mg tablet  Commonly known as: ULTRAM  Take 1 tablet (50 mg total) by mouth every 12 (twelve) hours as needed for Pain.              Indwelling Lines/Drains at time of discharge:   Lines/Drains/Airways       Central Venous Catheter Line  Duration                  PowerPort A Cath Single Lumen 02/26/25 1050 Internal Jugular Right <1 day                    Time spent on the discharge of patient: 32 minutes         Kimberly Segovia MD  Department of Hospital Medicine  [facility] - [facility]

## 2025-02-26 NOTE — CONSULTS
"Medical Oncology Consult Note    HPI Attempted to use virtual consult system, but 5th floor is "offline". History obtained from chart check.    49 year old female with recent diagnosis of metastatic breast CA who presented for evaluation of worsening jaundice. She saw Dr. Lopez on 2/19 to discuss tx. She called PCP complaining of darker urine and jaundiced eyes and was told to go to ED. ED workup showed T bili 22.4, Alk phos 697, AST//100, INR 1.3, Direct bilirubin >14.0. PET scan on 2/21/25 showed extensive hepatic metastatic disease with innumerable metastatic masses throughout the liver all of which are markedly FDG avid. FDG avid metastatic left para-aortic retroperitoneal lymph node in the upper abdomen. Discussed case with Dr. Lopez and patient was admitted for inpatient chemotherapy.     Labs:  CMP  Sodium   Date Value Ref Range Status   02/26/2025 139 136 - 145 mmol/L Final     Potassium   Date Value Ref Range Status   02/26/2025 3.8 3.5 - 5.1 mmol/L Final     Chloride   Date Value Ref Range Status   02/26/2025 108 95 - 110 mmol/L Final     CO2   Date Value Ref Range Status   02/26/2025 23 23 - 29 mmol/L Final     Glucose   Date Value Ref Range Status   02/26/2025 135 (H) 70 - 110 mg/dL Final     BUN   Date Value Ref Range Status   02/26/2025 24 (H) 6 - 20 mg/dL Final     Creatinine   Date Value Ref Range Status   02/26/2025 0.9 0.5 - 1.4 mg/dL Final     Calcium   Date Value Ref Range Status   02/26/2025 7.9 (L) 8.7 - 10.5 mg/dL Final     Total Protein   Date Value Ref Range Status   02/26/2025 5.9 (L) 6.0 - 8.4 g/dL Final     Albumin   Date Value Ref Range Status   02/26/2025 2.2 (L) 3.5 - 5.2 g/dL Final     Total Bilirubin   Date Value Ref Range Status   02/26/2025 21.3 (H) 0.1 - 1.0 mg/dL Final     Comment:     For infants and newborns, interpretation of results should be based  on gestational age, weight and in agreement with clinical  observations.    Premature Infant recommended reference " ranges:  Up to 24 hours.............<8.0 mg/dL  Up to 48 hours............<12.0 mg/dL  3-5 days..................<15.0 mg/dL  6-29 days.................<15.0 mg/dL       Alkaline Phosphatase   Date Value Ref Range Status   02/26/2025 570 (H) 40 - 150 U/L Final     AST   Date Value Ref Range Status   02/26/2025 425 (H) 10 - 40 U/L Final     ALT   Date Value Ref Range Status   02/26/2025 95 (H) 10 - 44 U/L Final     Anion Gap   Date Value Ref Range Status   02/26/2025 8 8 - 16 mmol/L Final     eGFR   Date Value Ref Range Status   02/26/2025 >60 >60 mL/min/1.73 m^2 Final     Lab Results   Component Value Date    WBC 11.32 02/26/2025    RBC 4.90 02/26/2025    HGB 13.3 02/26/2025    HCT 39.1 02/26/2025    MCV 80 (L) 02/26/2025    MCH 27.1 02/26/2025    MCHC 34.0 02/26/2025    RDW 20.9 (H) 02/26/2025     02/26/2025    MPV 10.4 02/26/2025    GRAN 10.2 (H) 02/26/2025    GRAN 89.9 (H) 02/26/2025    LYMPH 0.7 (L) 02/26/2025    LYMPH 6.0 (L) 02/26/2025    MONO 0.4 02/26/2025    MONO 3.4 (L) 02/26/2025    EOS 0.0 02/26/2025    BASO 0.01 02/26/2025    EOSINOPHIL 0.0 02/26/2025    BASOPHIL 0.1 02/26/2025     Assessment and Plan    Metastatic HER-2 positive breast cancer    Patient presented with visceral crisis, so was admitted for C1 chemotherapy inpatient. Received carboplatin and docetaxel on 2/24 afternoon. Her LFTs have not improved, but stable.    - recommend PT consult to assess rehab needs. If patient is able to go home, she can get future cycles as an outpatient.    Vahe Ivey MD  Medical Oncology  Director of the [facility]  [facility]

## 2025-02-26 NOTE — PLAN OF CARE
Discussed poc with pt, pt verbalized understanding    Purposeful rounding every 2hours    VS monitored as ordered    Cardiac monitoring in use, pt is NSR, tele monitor # [id number]    Fall precautions in place, remains injury free    PICC NS locked    Accurate I&Os    NPO since before midnight    CHG bath completed    Bed locked at lowest position     at bedside    Call light within reach    Chart check complete    Will cont with POC

## 2025-02-26 NOTE — ASSESSMENT & PLAN NOTE
49 year old female with a PMHx of metastatic breast cancer admitted for Transaminitis (T bili 22.4, AST//100). PET CT on 2/21 extensive hepatic metastatic disease with innumerable metastatic masses throughout the liver all of which are markedly FDG avid. FDG avid metastatic left para-aortic retroperitoneal lymph node in the upper abdomen. Markedly FDG avid biopsy-proven multicentric left breast cancer with metastatic level 1 and level 3 left axillary lymphadenopathy. ED discussed case with Hematology/Oncology concerned for visceral crisis, would like to admit to initiate chemotherapy.    PLAN:   -acute hepatitis panel and apap levels wnl   -Hematology/Oncology consulted to initiate chemotherapy, s/p Docetaxel/Carboplatin 02/24/25  -MRCP limited but showed no evidence of obstruction   - LFTs remain elevated but stable, ok for DC per Heme Onc   - outpatient followup with oncology, PCP

## 2025-02-27 ENCOUNTER — DOCUMENTATION ONLY (OUTPATIENT)
Dept: HEMATOLOGY/ONCOLOGY | Facility: CLINIC | Age: 50
End: 2025-02-27
Payer: COMMERCIAL

## 2025-02-27 NOTE — PROGRESS NOTES
"  Contacted pt and let her know I was contacting her to review scheduled appointments required to start outpt part of new treatment plan. Pt had port placed while in the hospital . Pt requested to have  Tx education on 3/4 during infusion as she is having to go to so many appts. ONN contacted educator and Educator agreed to chairside teaching on 3/4 during infusion. Pt will have pre tx labs drawn 3/3 and then see Dr Lopez at .  C1D8 will be at  on 3/4.  Patient verbalized understanding of all dates, times and places. All questions and concerns addressed at time of call. Provided pt with direct phone number for any further questions or concerns.  Oncology Navigation   Intake  Date of Diagnosis: 02/11/25  Cancer Type: Breast  Type of Referral: Internal  Date of Referral: 02/14/25  Initial Nurse Navigator Contact: 02/17/25  Referral to Initial Contact Timeline (days): 3  First Appointment Available: 02/17/25  Appointment Date: 02/17/25  First Available Date vs. Scheduled Date (days): 0  Multiple appointments: No  Start of Treatment: 02/25/25     Treatment  Current Status: Active    Surgical Oncologist: Dr.Kyle Sutton  Consult Date: 02/17/25    Medical Oncologist: Niyah Lopez MD  Consult Date: 02/20/25  Chemotherapy: Initiated  Chemotherapy Regimen: DOCETAXEL CARBOPLATIN (AUC) WEEKLY + PERTUZUMAB/TRASTUZUMAB DAY 8 Q3W       Procedures: Port / PICC  Port / PICC Schedule Date: 02/26/25    General Referrals: Genetic Counselor; Psychology    ER: Negative  NV: Negative  Her2: Postive       Support Systems: Spouse/significant other  Barriers of Care: Barriers to Care "Assessment completed-no barriers noted"     Acuity  Systemic Treatment - predicted or initiated: More than one treatment modality concurrently (chemotherapy, radiation, etc.) (+2)  Treatment Tolerability: Has not started treatment yet/treatment fully completed and side effects resolved  Comorbidities in Medical History: 1  Hospitalization Within the " Past Month: 1   Needed: 0  Support: 0  Verbalizes Financial Concerns: 0  Transportation: 0  History of noncompliance/frequent no shows and cancellations: 0  Verbalizes the need for more education: 1  Navigation Acuity: 8     Follow Up  No follow-ups on file.

## 2025-02-28 ENCOUNTER — HOSPITAL ENCOUNTER (OUTPATIENT)
Dept: CARDIOLOGY | Facility: HOSPITAL | Age: 50
Discharge: HOME | End: 2025-02-28
Attending: INTERNAL MEDICINE
Payer: COMMERCIAL

## 2025-02-28 VITALS
WEIGHT: 155 LBS | HEIGHT: 62 IN | BODY MASS INDEX: 28.52 KG/M2 | HEART RATE: 80 BPM | DIASTOLIC BLOOD PRESSURE: 56 MMHG | SYSTOLIC BLOOD PRESSURE: 101 MMHG

## 2025-02-28 DIAGNOSIS — Z17.1 MALIGNANT NEOPLASM OF OVERLAPPING SITES OF LEFT BREAST IN FEMALE, ESTROGEN RECEPTOR NEGATIVE: ICD-10-CM

## 2025-02-28 DIAGNOSIS — C50.812 MALIGNANT NEOPLASM OF OVERLAPPING SITES OF LEFT BREAST IN FEMALE, ESTROGEN RECEPTOR NEGATIVE: ICD-10-CM

## 2025-02-28 DIAGNOSIS — C50.812 MALIGNANT NEOPLASM OF OVERLAPPING SITES OF LEFT BREAST IN FEMALE, ESTROGEN RECEPTOR NEGATIVE: Primary | ICD-10-CM

## 2025-02-28 DIAGNOSIS — I10 PRIMARY HYPERTENSION: ICD-10-CM

## 2025-02-28 DIAGNOSIS — Z17.1 MALIGNANT NEOPLASM OF OVERLAPPING SITES OF LEFT BREAST IN FEMALE, ESTROGEN RECEPTOR NEGATIVE: Primary | ICD-10-CM

## 2025-02-28 LAB
AORTIC ROOT ANNULUS: 2.68 CM
APICAL FOUR CHAMBER EJECTION FRACTION: 59 %
APICAL TWO CHAMBER EJECTION FRACTION: 77 %
AV INDEX (PROSTH): 1.03
AV MEAN GRADIENT: 4 MMHG
AV PEAK GRADIENT: 6 MMHG
AV VALVE AREA BY VELOCITY RATIO: 3.1 CM²
AV VALVE AREA: 3.2 CM²
AV VELOCITY RATIO: 1
BSA FOR ECHO PROCEDURE: 1.75 M2
CV ECHO LV RWT: 0.46 CM
DOP CALC AO PEAK VEL: 1.2 M/S
DOP CALC AO VTI: 22.1 CM
DOP CALC LVOT AREA: 3.1 CM2
DOP CALC LVOT DIAMETER: 2 CM
DOP CALC LVOT PEAK VEL: 1.2 M/S
DOP CALC LVOT STROKE VOLUME: 71.3 CM3
DOP CALC RVOT PEAK VEL: 0.91 M/S
DOP CALC RVOT VTI: 17.6 CM
DOP CALCLVOT PEAK VEL VTI: 22.7 CM
E WAVE DECELERATION TIME: 191 MSEC
E/A RATIO: 0.77
E/E' RATIO: 10 M/S
ECHO LV POSTERIOR WALL: 0.9 CM (ref 0.6–1.1)
FRACTIONAL SHORTENING: 38.5 % (ref 28–44)
GLOBAL LONGITUIDAL STRAIN: 17.2 %
INTERVENTRICULAR SEPTUM: 0.9 CM (ref 0.6–1.1)
IVRT: 91 MSEC
LA MAJOR: 3.5 CM
LA MINOR: 3.6 CM
LA WIDTH: 3.2 CM
LEFT ATRIUM AREA SYSTOLIC (APICAL 2 CHAMBER): 10.28 CM2
LEFT ATRIUM AREA SYSTOLIC (APICAL 4 CHAMBER): 10.36 CM2
LEFT ATRIUM SIZE: 2.7 CM
LEFT ATRIUM VOLUME INDEX MOD: 12 ML/M2
LEFT ATRIUM VOLUME INDEX: 15 ML/M2
LEFT ATRIUM VOLUME MOD: 20 ML
LEFT ATRIUM VOLUME: 26 CM3
LEFT INTERNAL DIMENSION IN SYSTOLE: 2.4 CM (ref 2.1–4)
LEFT VENTRICLE DIASTOLIC VOLUME INDEX: 38.37 ML/M2
LEFT VENTRICLE DIASTOLIC VOLUME: 66 ML
LEFT VENTRICLE END DIASTOLIC VOLUME APICAL 2 CHAMBER: 55.51 ML
LEFT VENTRICLE END DIASTOLIC VOLUME APICAL 4 CHAMBER: 47.57 ML
LEFT VENTRICLE END SYSTOLIC VOLUME APICAL 2 CHAMBER: 18.17 ML
LEFT VENTRICLE END SYSTOLIC VOLUME APICAL 4 CHAMBER: 20.89 ML
LEFT VENTRICLE MASS INDEX: 61.2 G/M2
LEFT VENTRICLE SYSTOLIC VOLUME INDEX: 11.6 ML/M2
LEFT VENTRICLE SYSTOLIC VOLUME: 20 ML
LEFT VENTRICULAR INTERNAL DIMENSION IN DIASTOLE: 3.9 CM (ref 3.5–6)
LEFT VENTRICULAR MASS: 105.3 G
LV LATERAL E/E' RATIO: 9.8 M/S
LV SEPTAL E/E' RATIO: 11.1 M/S
LVED V (TEICH): 66.33 ML
LVES V (TEICH): 19.82 ML
LVOT MG: 4.1 MMHG
LVOT MV: 0.97 CM/S
MV PEAK A VEL: 1.01 M/S
MV PEAK E VEL: 0.78 M/S
MV STENOSIS PRESSURE HALF TIME: 55.4 MS
MV VALVE AREA P 1/2 METHOD: 3.97 CM2
OHS CV RV/LV RATIO: 0.69 CM
OHS LV EJECTION FRACTION SIMPSONS BIPLANE MOD: 71 %
PISA TR MAX VEL: 2.2 M/S
PV MEAN GRADIENT: 2 MMHG
PV PEAK GRADIENT: 4 MMHG
PV PEAK VELOCITY: 1.01 M/S
RA MAJOR: 3.31 CM
RA PRESSURE ESTIMATED: 3 MMHG
RA WIDTH: 2.65 CM
RIGHT VENTRICLE DIASTOLIC BASEL DIMENSION: 2.7 CM
RIGHT VENTRICULAR END-DIASTOLIC DIMENSION: 2.73 CM
RV TB RVSP: 5 MMHG
SINUS: 2.71 CM
STJ: 2.55 CM
TDI LATERAL: 0.08 M/S
TDI SEPTAL: 0.07 M/S
TDI: 0.08 M/S
TRICUSPID ANNULAR PLANE SYSTOLIC EXCURSION: 1.56 CM
TV REST PULMONARY ARTERY PRESSURE: 22 MMHG
Z-SCORE OF LEFT VENTRICULAR DIMENSION IN END DIASTOLE: -2.01
Z-SCORE OF LEFT VENTRICULAR DIMENSION IN END SYSTOLE: -1.64

## 2025-02-28 PROCEDURE — 93356 MYOCRD STRAIN IMG SPCKL TRCK: CPT

## 2025-02-28 PROCEDURE — 93356 MYOCRD STRAIN IMG SPCKL TRCK: CPT | Mod: ,,, | Performed by: INTERNAL MEDICINE

## 2025-02-28 PROCEDURE — 93306 TTE W/DOPPLER COMPLETE: CPT | Mod: 26,,, | Performed by: INTERNAL MEDICINE

## 2025-03-03 ENCOUNTER — TELEPHONE (OUTPATIENT)
Dept: HEMATOLOGY/ONCOLOGY | Facility: CLINIC | Age: 50
End: 2025-03-03
Payer: COMMERCIAL

## 2025-03-03 ENCOUNTER — OFFICE VISIT (OUTPATIENT)
Dept: HEMATOLOGY/ONCOLOGY | Facility: CLINIC | Age: 50
End: 2025-03-03
Payer: COMMERCIAL

## 2025-03-03 ENCOUNTER — INFUSION (OUTPATIENT)
Dept: INFUSION THERAPY | Facility: HOSPITAL | Age: 50
End: 2025-03-03
Attending: INTERNAL MEDICINE
Payer: COMMERCIAL

## 2025-03-03 ENCOUNTER — TELEPHONE (OUTPATIENT)
Dept: RADIOLOGY | Facility: HOSPITAL | Age: 50
End: 2025-03-03
Payer: COMMERCIAL

## 2025-03-03 VITALS
SYSTOLIC BLOOD PRESSURE: 88 MMHG | DIASTOLIC BLOOD PRESSURE: 55 MMHG | TEMPERATURE: 98 F | HEIGHT: 62 IN | RESPIRATION RATE: 18 BRPM | BODY MASS INDEX: 27.43 KG/M2 | HEART RATE: 100 BPM | WEIGHT: 149.06 LBS | OXYGEN SATURATION: 99 %

## 2025-03-03 VITALS
OXYGEN SATURATION: 96 % | SYSTOLIC BLOOD PRESSURE: 113 MMHG | TEMPERATURE: 98 F | DIASTOLIC BLOOD PRESSURE: 72 MMHG | RESPIRATION RATE: 16 BRPM | HEART RATE: 101 BPM

## 2025-03-03 DIAGNOSIS — T45.1X5A CHEMOTHERAPY INDUCED DIARRHEA: ICD-10-CM

## 2025-03-03 DIAGNOSIS — K12.31 MUCOSITIS DUE TO CHEMOTHERAPY: ICD-10-CM

## 2025-03-03 DIAGNOSIS — D70.1 CHEMOTHERAPY INDUCED NEUTROPENIA: ICD-10-CM

## 2025-03-03 DIAGNOSIS — C50.812 MALIGNANT NEOPLASM OF OVERLAPPING SITES OF LEFT BREAST IN FEMALE, ESTROGEN RECEPTOR NEGATIVE: Primary | ICD-10-CM

## 2025-03-03 DIAGNOSIS — F41.9 ANXIETY: ICD-10-CM

## 2025-03-03 DIAGNOSIS — Z17.1 MALIGNANT NEOPLASM OF OVERLAPPING SITES OF LEFT BREAST IN FEMALE, ESTROGEN RECEPTOR NEGATIVE: Primary | ICD-10-CM

## 2025-03-03 DIAGNOSIS — T45.1X5A CHEMOTHERAPY INDUCED NEUTROPENIA: ICD-10-CM

## 2025-03-03 DIAGNOSIS — R74.01 TRANSAMINITIS: ICD-10-CM

## 2025-03-03 DIAGNOSIS — K52.1 CHEMOTHERAPY INDUCED DIARRHEA: ICD-10-CM

## 2025-03-03 PROBLEM — D70.9 NEUTROPENIA: Status: ACTIVE | Noted: 2025-03-03

## 2025-03-03 PROBLEM — N17.9 AKI (ACUTE KIDNEY INJURY): Status: ACTIVE | Noted: 2025-03-03

## 2025-03-03 PROBLEM — R11.2 INTRACTABLE NAUSEA AND VOMITING: Status: ACTIVE | Noted: 2025-03-03

## 2025-03-03 PROCEDURE — 1111F DSCHRG MED/CURRENT MED MERGE: CPT | Mod: CPTII,S$GLB,, | Performed by: INTERNAL MEDICINE

## 2025-03-03 PROCEDURE — 96375 TX/PRO/DX INJ NEW DRUG ADDON: CPT

## 2025-03-03 PROCEDURE — 99215 OFFICE O/P EST HI 40 MIN: CPT | Mod: S$GLB,,, | Performed by: INTERNAL MEDICINE

## 2025-03-03 PROCEDURE — 3074F SYST BP LT 130 MM HG: CPT | Mod: CPTII,S$GLB,, | Performed by: INTERNAL MEDICINE

## 2025-03-03 PROCEDURE — 96374 THER/PROPH/DIAG INJ IV PUSH: CPT

## 2025-03-03 PROCEDURE — 3008F BODY MASS INDEX DOCD: CPT | Mod: CPTII,S$GLB,, | Performed by: INTERNAL MEDICINE

## 2025-03-03 PROCEDURE — 3078F DIAST BP <80 MM HG: CPT | Mod: CPTII,S$GLB,, | Performed by: INTERNAL MEDICINE

## 2025-03-03 PROCEDURE — 63600175 PHARM REV CODE 636 W HCPCS: Performed by: INTERNAL MEDICINE

## 2025-03-03 PROCEDURE — G2211 COMPLEX E/M VISIT ADD ON: HCPCS | Mod: S$GLB,,, | Performed by: INTERNAL MEDICINE

## 2025-03-03 PROCEDURE — 4010F ACE/ARB THERAPY RXD/TAKEN: CPT | Mod: CPTII,S$GLB,, | Performed by: INTERNAL MEDICINE

## 2025-03-03 PROCEDURE — 25000003 PHARM REV CODE 250: Performed by: INTERNAL MEDICINE

## 2025-03-03 PROCEDURE — 96361 HYDRATE IV INFUSION ADD-ON: CPT

## 2025-03-03 PROCEDURE — 99999 PR PBB SHADOW E&M-EST. PATIENT-LVL IV: CPT | Mod: PBBFAC,,, | Performed by: INTERNAL MEDICINE

## 2025-03-03 RX ORDER — ATROPINE SULFATE 0.4 MG/ML
0.2 INJECTION, SOLUTION ENDOTRACHEAL; INTRAMEDULLARY; INTRAMUSCULAR; INTRAVENOUS; SUBCUTANEOUS
Status: CANCELLED
Start: 2025-03-03

## 2025-03-03 RX ORDER — ATROPINE SULFATE 0.4 MG/ML
0.2 INJECTION, SOLUTION ENDOTRACHEAL; INTRAMEDULLARY; INTRAMUSCULAR; INTRAVENOUS; SUBCUTANEOUS
Status: COMPLETED | OUTPATIENT
Start: 2025-03-03 | End: 2025-03-03

## 2025-03-03 RX ORDER — ONDANSETRON HYDROCHLORIDE 2 MG/ML
8 INJECTION, SOLUTION INTRAVENOUS ONCE
Status: CANCELLED
Start: 2025-03-03 | End: 2025-03-03

## 2025-03-03 RX ORDER — LOPERAMIDE HYDROCHLORIDE 2 MG/1
2 CAPSULE ORAL
Qty: 90 CAPSULE | Refills: 0 | Status: SHIPPED | OUTPATIENT
Start: 2025-03-03 | End: 2025-04-02

## 2025-03-03 RX ORDER — DEXTROSE MONOHYDRATE AND SODIUM CHLORIDE 5; .9 G/100ML; G/100ML
INJECTION, SOLUTION INTRAVENOUS CONTINUOUS
Status: DISCONTINUED | OUTPATIENT
Start: 2025-03-03 | End: 2025-03-03 | Stop reason: HOSPADM

## 2025-03-03 RX ORDER — SODIUM CHLORIDE 0.9 % (FLUSH) 0.9 %
10 SYRINGE (ML) INJECTION
Status: CANCELLED | OUTPATIENT
Start: 2025-03-03

## 2025-03-03 RX ORDER — HEPARIN 100 UNIT/ML
500 SYRINGE INTRAVENOUS
OUTPATIENT
Start: 2025-03-03

## 2025-03-03 RX ORDER — DEXTROSE MONOHYDRATE AND SODIUM CHLORIDE 5; .9 G/100ML; G/100ML
INJECTION, SOLUTION INTRAVENOUS CONTINUOUS
Status: CANCELLED
Start: 2025-03-03 | End: 2025-03-04

## 2025-03-03 RX ORDER — ATROPINE SULFATE 0.4 MG/ML
0.2 INJECTION, SOLUTION ENDOTRACHEAL; INTRAMEDULLARY; INTRAMUSCULAR; INTRAVENOUS; SUBCUTANEOUS
Status: CANCELLED
Start: 2025-03-04

## 2025-03-03 RX ORDER — SODIUM CHLORIDE 0.9 % (FLUSH) 0.9 %
10 SYRINGE (ML) INJECTION
OUTPATIENT
Start: 2025-03-03

## 2025-03-03 RX ORDER — DIPHENOXYLATE HYDROCHLORIDE AND ATROPINE SULFATE 2.5; .025 MG/1; MG/1
1 TABLET ORAL 4 TIMES DAILY PRN
Qty: 30 TABLET | Refills: 1 | Status: SHIPPED | OUTPATIENT
Start: 2025-03-03 | End: 2026-03-03

## 2025-03-03 RX ORDER — DIPHENOXYLATE HYDROCHLORIDE AND ATROPINE SULFATE 2.5; .025 MG/1; MG/1
1 TABLET ORAL ONCE
Status: CANCELLED
Start: 2025-03-03 | End: 2025-03-03

## 2025-03-03 RX ORDER — ONDANSETRON HYDROCHLORIDE 2 MG/ML
8 INJECTION, SOLUTION INTRAVENOUS ONCE
Status: COMPLETED | OUTPATIENT
Start: 2025-03-03 | End: 2025-03-03

## 2025-03-03 RX ORDER — HEPARIN 100 UNIT/ML
500 SYRINGE INTRAVENOUS
Status: DISCONTINUED | OUTPATIENT
Start: 2025-03-03 | End: 2025-03-03 | Stop reason: HOSPADM

## 2025-03-03 RX ORDER — ATROPINE SULFATE 0.4 MG/ML
0.2 INJECTION, SOLUTION ENDOTRACHEAL; INTRAMEDULLARY; INTRAMUSCULAR; INTRAVENOUS; SUBCUTANEOUS
Status: DISCONTINUED | OUTPATIENT
Start: 2025-03-03 | End: 2025-03-03 | Stop reason: HOSPADM

## 2025-03-03 RX ADMIN — HEPARIN 500 UNITS: 100 SYRINGE at 03:03

## 2025-03-03 RX ADMIN — SODIUM CHLORIDE 1000 ML: 9 INJECTION, SOLUTION INTRAVENOUS at 02:03

## 2025-03-03 RX ADMIN — ONDANSETRON 8 MG: 2 INJECTION INTRAMUSCULAR; INTRAVENOUS at 02:03

## 2025-03-03 RX ADMIN — ATROPINE SULFATE 0.2 MG: 0.4 INJECTION, SOLUTION INTRAVENOUS at 02:03

## 2025-03-03 NOTE — HPI
50 year old female, comorbid conditions include Breast Cancer, Transaminitis, hyperbilirubinemia. Presented to Oncology clinic with c/o nausea/vomiting, diarrhea. Patient s/p first cycle chemotherapy on  with docetaxel. Planned for anti-her2 therapy as OP. Patient has known lesions to liver, MRI liver on  did not show obstructive process. Bili has been elevated in the range of 19-24 since diagnosis and has been jaundiced. In the oncology clinic: BP: 88/55, improved to 113/72 after IV fluids. Significant Labs: WBC: 1.22, CO2: 19, Cr: 2.0, Ca: 7.9, Ma.9, ALP: 613, Bili: 24.1, AST: 630, ALT: 149. Patient treated with IV fluids, anti-emetics in infusion center. Oncology recommended admission. Patient is a full code. Admitted to Hospital Medicine for management of Neutropenia, Intractable N/V, CINDI, Hyperbilirubinemia.

## 2025-03-03 NOTE — PROGRESS NOTES
Patient ID: Serina Barriga   Reason for Visit: Chemotherapy, Nausea, and Diarrhea  MRN:  [id number]     Oncologic Diagnosis:  L Breast Invasive Ductal Carcinoma with Micropapillary Features (cT2,cN1,Mx),  G2, ki67 70-80%, -/-/+  Previous Treatment:  Docetaxel/Carboplatin  Current Treatment:  Docetaxel/Carboplatin/TP  Subjective   The patient presents for follow up and is accompanied by her .  They report that since yesterday she has had an acute decline.  Since last week she has been having diarrhea, and nausea.  She has also developed mucositis.  She is not taking any antimotility agents but is taking antiemetics.  I reviewed her labs with her she is very dehydrated and hypotensive.  I recommend that she be admitted to the hospital for supportive care with the intention of resuming treatment outpatient.  She is in agreement.    Review of Systems   Constitutional:  Positive for activity change and fatigue. Negative for appetite change, chills, diaphoresis, fever and unexpected weight change.   Respiratory:  Negative for shortness of breath.    Cardiovascular:  Negative for chest pain.   Gastrointestinal:  Positive for abdominal pain, diarrhea and nausea. Negative for abdominal distention, anal bleeding, blood in stool, constipation and vomiting.   Genitourinary:  Negative for difficulty urinating and hematuria.   Musculoskeletal:  Negative for arthralgias, back pain and myalgias.   Skin:  Negative for rash.   Neurological:  Negative for dizziness, weakness, light-headedness and headaches.   Hematological:  Does not bruise/bleed easily.   Psychiatric/Behavioral:  The patient is not nervous/anxious.      History   Prev HPI  Serina Barriga is a 50 y.o. female with depreesion, HTN, and utrine fibroids who presents to clinic to establish care on referral for recently diagnosed breast cancer and is accompanied by her .     Understandably,  is extremely anxious about her  diagnosis and pending PET-CT results.  She saw her PCP today and was noted to be jaundiced.  Chemistry panel was obtained which the patient showed results on her phone to me.  She has a significant transaminitis with elevated AST/ALT and bilirubin.  Of course the discussion was had with her primary care doctor that this could represent metastatic breast cancer to the liver.  I discussed with her further and she started a statin approximately 3-4 weeks ago for hyperlipidemia.  She has also been taking copious amounts of Tylenol for right-sided abdominal pain that radiates to her shoulder.  I discussed with her that while certainly this could be metastatic breast cancer, and that I can make no guarantees that this is not, that with the timing of her recent medication ingestion this most likely represents medication induced transaminitis.  The PET-CT we will wait list further.  I assured her that even if this turns out to be stage IV disease she will still be a candidate for systemic therapy.  While not curable, systemic therapy may control her cancer is stage IV.  I encouraged her to not think worse case scenario at this time but to try to just take it 1 day at a time.    We spent the majority of our visit addressing her anxiety.  I encouraged her to stop gurgling, reading various sources of information, and contemplating various outcomes until she knows exactly what she is dealing with and we give her and exact stage as well as treatment plan.  She agrees.  Additionally she has not been sleeping because she has been very anxious.  She started diazepam with some relief.  She will wait to hear about her PET-CT results.  I reviewed with her and her  the typical treatment for non stage IV HER2 positive breast cancer which includes neoadjuvant systemic therapy.  I briefly reviewed with the treatments for stage IV breast cancer which would be chemotherapy with likely progression at some point on a regimen requiring  change of chemotherapy.  They expressed understanding.  I told him that trying to give exact information on multiple scenarios would not be beneficial to her at this time as she only has 24 hours before she receives her PET-CT results.  Myself or Dr. Doss or his staff will be in touch with her regarding her results.  .    Oncology History   Malignant neoplasm of overlapping sites of left breast in female, estrogen receptor negative   2/21/2025 Initial Diagnosis    Malignant neoplasm of overlapping sites of left breast in female, estrogen receptor negative     2/24/2025 - 2/26/2025 Chemotherapy    Treatment Summary   Plan Name: OP BREAST DOCETAXEL CARBOPLATIN (AUC) WEEKLY + PERTUZUMAB/TRASTUZUMAB DAY 8 Q3W  Treatment Goal: Control  Status: Inactive  Start Date: 2/24/2025  End Date: 2/24/2025  Provider: Niyah Lopez MD  Chemotherapy: CARBOplatin (PARAPLATIN) 240 mg in 0.9% NaCl 309 mL chemo infusion, 240 mg (100 % of original dose 238.8 mg), Intravenous, Once, 1 of 1 cycle  Dose modification:   (original dose 238.8 mg, Cycle 1)  Administration: 240 mg (2/24/2025)  DOCEtaxel 35 mg/m2 = 60 mg in 0.9% NaCl 118 mL chemo infusion, 35 mg/m2 = 60 mg (100 % of original dose 35 mg/m2), Intravenous, Once, 1 of 1 cycle  Dose modification: 35 mg/m2 (original dose 35 mg/m2, Cycle 1, Reason: MD Discretion, Comment: breast regimen)  Administration: 60 mg (2/24/2025)  pertuzumab (PERJETA) 840 mg in 0.9% NaCl 278 mL infusion, 840 mg (original dose ), Intravenous, Clinic/HOD 1 time, 1 of 1 cycle  Dose modification: 840 mg (Cycle 1, Reason: MD Discretion, Comment: HER2+)  trastuzumab-dkst (OGIVRI) 556 mg in 0.9% NaCl 250 mL chemo infusion, 8 mg/kg = 556 mg (original dose ), Intravenous, Clinic/HOD 1 time, 1 of 1 cycle  Dose modification: 8 mg/kg (Cycle 1)     3/3/2025 - 3/3/2025 Chemotherapy    Treatment Summary   Plan Name: OP BREAST DOCEtaxel trastuzumab pertuzumab Q3W  Treatment Goal: Curative  Status: Inactive  Start Date:    End Date: 2/21/2025  Provider: Niyah Lopez MD  Chemotherapy: DOCEtaxel (TAXOTERE) 75 mg/m2 = 130 mg in 0.9% NaCl 256.5 mL chemo infusion, 75 mg/m2 = 130 mg, Intravenous, Clinic/HOD 1 time, 0 of 18 cycles  pertuzumab (PERJETA) 840 mg in 0.9% NaCl 278 mL infusion, 840 mg, Intravenous, Clinic/HOD 1 time, 0 of 18 cycles  trastuzumab-anns (KANJINTI) 556 mg in 0.9% NaCl 250 mL chemo infusion, 8 mg/kg = 556 mg, Intravenous, Clinic/HOD 1 time, 0 of 18 cycles     3/24/2025 -  Chemotherapy    Treatment Summary   Plan Name: OP BREAST DOCEtaxel trastuzumab pertuzumab Q3W  Treatment Goal: Control  Status: Active  Start Date: 3/24/2025 (Planned)  End Date: 3/17/2026 (Planned)  Provider: Niyah Lopez MD  Chemotherapy: DOCEtaxel (TAXOTERE) 75 mg/m2 = 132 mg in 0.9% NaCl 256.6 mL chemo infusion, 75 mg/m2 = 132 mg, Intravenous, Clinic/HOD 1 time, 0 of 18 cycles  pertuzumab (PERJETA) 420 mg in 0.9% NaCl 264 mL infusion, 420 mg (original dose ), Intravenous, Clinic/HOD 1 time, 0 of 18 cycles  Dose modification: 420 mg (Cycle 1, Reason: MD Discretion, Comment: given HP LD in prior treatment plan)  trastuzumab-dkst (OGIVRI) 422 mg in 0.9% NaCl 250 mL chemo infusion, 6 mg/kg = 422 mg (original dose ), Intravenous, Clinic/HOD 1 time, 0 of 18 cycles  Dose modification: 6 mg/kg (Cycle 1, Reason: MD Discretion, Comment: gave HP LD in prior plan)           Past Medical History:   Diagnosis Date    Depression     controlled with meds    Fibroid December 2, 2022    Hypertension     It's controlled with meds.       Past Surgical History:   Procedure Laterality Date    FUNCTIONAL ENDOSCOPIC SINUS SURGERY (FESS) USING COMPUTER-ASSISTED NAVIGATION  02/2022       Family History   Problem Relation Name Age of Onset    Breast cancer Mother Maria Angelo 63        triple negative breast cancer  masectomy    Hypertension Mother Maria Angelo     Prostate cancer Father Rikki Angelo 70        prostectomy    Thyroid disease Maternal  "Aunt Sherry Lazarus     Lung cancer Maternal Grandmother          smoker    Skin cancer Paternal Grandfather          face    Other (HPV) Daughter  23    Breast cancer Other Fifi 60 - 69    Ovarian cancer Neg Hx      Colon cancer Neg Hx         Review of patient's allergies indicates:   Allergen Reactions    Codeine Rash       Social History[1]    Physical Exam   ECOG:   ECOG SCORE    0 - Fully active-able to carry on all pre-disease performance without restriction          Vitals:  BP (!) 88/55   Pulse 100   Temp 98.1 °F (36.7 °C)   Resp 18   Ht 5' 2" (1.575 m)   Wt 67.6 kg (149 lb 0.5 oz)   LMP 02/04/2025 (Approximate)   SpO2 99%   BMI 27.26 kg/m²       Physical Exam  Constitutional:       General: She is not in acute distress.     Appearance: Normal appearance. She is not ill-appearing.   HENT:      Head: Normocephalic and atraumatic.   Eyes:      General: Scleral icterus present.      Extraocular Movements: Extraocular movements intact.      Conjunctiva/sclera: Conjunctivae normal.   Cardiovascular:      Rate and Rhythm: Normal rate.   Pulmonary:      Effort: Pulmonary effort is normal. No respiratory distress.      Breath sounds: Normal breath sounds.   Abdominal:      Palpations: There is no hepatomegaly or splenomegaly.   Musculoskeletal:         General: Normal range of motion.      Right lower leg: No edema.      Left lower leg: No edema.   Skin:     Coloration: Skin is jaundiced.      Findings: No rash.   Neurological:      General: No focal deficit present.      Mental Status: She is alert and oriented to person, place, and time.   Psychiatric:         Mood and Affect: Mood normal.         Behavior: Behavior normal.         Thought Content: Thought content normal.        Labs   Labs:  Lab Visit on 03/03/2025   Component Date Value Ref Range Status    Magnesium 03/03/2025 2.9 (H)  1.6 - 2.6 mg/dL Final    Phosphorus 03/03/2025 6.0 (H)  2.7 - 4.5 mg/dL Final    WBC 03/03/2025 1.22 (LL)  3.90 - " 12.70 K/uL Final    Comment: WBC critical result(s) called and verbal readback obtained from   Villa Richardson LPN by CL2 03/03/2025 13:09      RBC 03/03/2025 5.14  4.00 - 5.40 M/uL Final    Hemoglobin 03/03/2025 13.8  12.0 - 16.0 g/dL Final    Hematocrit 03/03/2025 40.4  37.0 - 48.5 % Final    MCV 03/03/2025 79 (L)  82 - 98 fL Final    MCH 03/03/2025 26.8 (L)  27.0 - 31.0 pg Final    MCHC 03/03/2025 34.2  32.0 - 36.0 g/dL Final    RDW 03/03/2025 22.3 (H)  11.5 - 14.5 % Final    Platelets 03/03/2025 178  150 - 450 K/uL Final    MPV 03/03/2025 11.2  9.2 - 12.9 fL Final    Immature Granulocytes 03/03/2025 CANCELED  0.0 - 0.5 % Final    Result canceled by the ancillary.    Immature Grans (Abs) 03/03/2025 CANCELED  0.00 - 0.04 K/uL Final    Comment: Mild elevation in immature granulocytes is non specific and   can be seen in a variety of conditions including stress response,   acute inflammation, trauma and pregnancy. Correlation with other   laboratory and clinical findings is essential.    Result canceled by the ancillary.      Lymph # 03/03/2025 CANCELED  1.0 - 4.8 K/uL Final    Result canceled by the ancillary.    Mono # 03/03/2025 CANCELED  0.3 - 1.0 K/uL Final    Result canceled by the ancillary.    Eos # 03/03/2025 CANCELED  0.0 - 0.5 K/uL Final    Result canceled by the ancillary.    Baso # 03/03/2025 CANCELED  0.00 - 0.20 K/uL Final    Result canceled by the ancillary.    nRBC 03/03/2025 0  0 /100 WBC Final    Gran % 03/03/2025 3.0 (L)  38.0 - 73.0 % Final    Lymph % 03/03/2025 91.0 (H)  18.0 - 48.0 % Final    Mono % 03/03/2025 5.0  4.0 - 15.0 % Final    Eosinophil % 03/03/2025 1.0  0.0 - 8.0 % Final    Basophil % 03/03/2025 0.0  0.0 - 1.9 % Final    Platelet Estimate 03/03/2025 Appears normal   Final    Aniso 03/03/2025 Slight   Final    Poik 03/03/2025 Slight   Final    Hypo 03/03/2025 Occasional   Final    Target Cells 03/03/2025 Occasional   Final    Stomatocytes 03/03/2025 Present   Final    Schistocytes  03/03/2025 Present   Final    Rouleaux 03/03/2025 Present (A)   Final    Differential Method 03/03/2025 Manual   Final    Sodium 03/03/2025 139  136 - 145 mmol/L Final    Potassium 03/03/2025 5.1  3.5 - 5.1 mmol/L Final    Chloride 03/03/2025 108  95 - 110 mmol/L Final    CO2 03/03/2025 19 (L)  23 - 29 mmol/L Final    Glucose 03/03/2025 90  70 - 110 mg/dL Final    BUN 03/03/2025 48 (H)  6 - 20 mg/dL Final    Creatinine 03/03/2025 2.0 (H)  0.5 - 1.4 mg/dL Final    Calcium 03/03/2025 7.9 (L)  8.7 - 10.5 mg/dL Final    Total Protein 03/03/2025 5.6 (L)  6.0 - 8.4 g/dL Final    Albumin 03/03/2025 2.0 (L)  3.5 - 5.2 g/dL Final    Total Bilirubin 03/03/2025 24.1 (H)  0.1 - 1.0 mg/dL Final    Comment: For infants and newborns, interpretation of results should be based  on gestational age, weight and in agreement with clinical  observations.    Premature Infant recommended reference ranges:  Up to 24 hours.............<8.0 mg/dL  Up to 48 hours............<12.0 mg/dL  3-5 days..................<15.0 mg/dL  6-29 days.................<15.0 mg/dL      Alkaline Phosphatase 03/03/2025 613 (H)  40 - 150 U/L Final    AST 03/03/2025 630 (H)  10 - 40 U/L Final    ALT 03/03/2025 149 (H)  10 - 44 U/L Final    eGFR 03/03/2025 30 (A)  >60 mL/min/1.73 m^2 Final    Anion Gap 03/03/2025 12  8 - 16 mmol/L Final     Pathology   Specimen to Pathology, Radiology Breast, needle biopsy - 02/11/2025      Component  Ref Range & Units (hover) 8 d ago   Final Pathologic Diagnosis 1. LEFT BREAST, 11:00 O'CLOCK, NEEDLE CORE BIOPSIES:    -  Invasive ductal carcinoma, [place] histologic grade 2, with focal micropapillary features.          Glandular (acinar)/tubular differentiation:  Score 3.            Nuclear pleomorphism: Score 2.            Mitotic rate:  Score 2.            Overall grade: Grade 2 (score 7).          Size of invasive carcinoma as measured from the H&E slides: 5 mm.    BREAST BIOMARKER TESTING  RESULTS:  Estrogen receptor (ER)  status: Negative.  Progesterone receptor (PgR) status: Negative.  HER2 by IHC:  Positive (score 3+).    Ki-67 proliferation index:  50-60%.    2. LEFT BREAST, 12:00 O'CLOCK, NEEDLE CORE BIOPSIES:  -  Invasive ductal carcinoma, [place] histologic grade 2.            Glandular (acinar)/tubular differentiation:  Score 3.            Nuclear pleomorphism: Score 2.            Mitotic rate: Score 2.            Overall grade:  Grade 2 (score 7).            Size of invasive carcinoma as measured from the H&E slides:  10 mm.    BREAST BIOMARKER TESTING RESULTS:  Estrogen receptor (ER) status: Negative (less than 1% of tumor cells demonstrate weak nuclear staining).    Progesterone receptor (PgR):  Negative.    HER2 by IHC:  Positive (score 3+).  Ki-67 proliferation rate:  70-80%.    3. LEFT AXILLA, NEEDLE CORE BIOPSIES:  -  Invasive ductal carcinoma of the breast, [place] histologic grade 2, most consistent with a metastasis to an axillary lymph node (see comment).    COMMENT:  Definitive lymph node tissue is not identified in the left axilla biopsies, but there is a focal suggestion of a lymph node capsule and subcapsular sinus.  There is no benign breast tissue present.  This most likely represents metastatic tumor  which has completely replaced the lymph node tissue.  Dr. Terri Bassett has reviewed this case and concurs in the diagnosis.   Comment: Interp By Ladonna Michaud MD, Signed on 02/14/2025 at 15:24   Gross Three Part Case:    Part 1  Pathology MRN# [id number]  Hospital/Clinic label MRN# [id number]    Received in formalin labeled with the patient's name, MRN, and &quot;left breast 11:00&quot; are 6 white-yellow fibrofatty soft tissue pieces ranging from 0.3 x 0.1 cm to 1.5 x 0.2 cm.  The specimen is submitted in toto in cassettes:  SPA--1-A  JLB--1-B    A gross photograph is taken.  Time collected:  Not recorded  Time in formalin: 1037  Ischemic time:  Not recorded    Part 2  Pathology MRN#  [id number]  Hospital/Clinic label MRN# [id number]    Received in formalin labeled with the patient's name, MRN, and &quot;left breast 12:00&quot; are 3 white-yellow fibrofatty soft tissue pieces ranging from 0.3 x 0.2 cm to 1.1 x 0.2 cm.  The specimen is submitted in toto in cassette:  OQI--2-A    A gross photograph is taken.  Time collected:  Not recorded  Time in formalin: 1058  Ischemic time:  Not recorded    Part 3  Pathology MRN# [id number]  Hospital/Clinic label MRN# [id number]    Received in formalin labeled with the patient's name, MRN, and &quot;left axilla&quot; are 4 white-yellow fibrofatty soft tissue cores ranging from 0.9 x 0.1 cm to 1.5 x 0.1 cm.  The specimen is submitted in toto in cassettes:  GFH--3-A  ODU--3-B    A gross photograph is taken.  Time collected:  Not recorded  Time in formalin: 1118  Ischemic time:  Not recorded    SERA Domingo PA ([facility])CM   Microscopic Exam 1. The left breast needle core biopsies from 11:00 o'clock are examined at multiple levels.  There is an invasive carcinoma present composed of a proliferation of medium-sized to large epithelial cells which demonstrate a marked increase in nuclear to  cytoplasmic ratios and have hyperchromatic to slightly vesicular nuclei.  Some cells have large nucleoli.  There is moderate-to-marked cellular pleomorphism and scattered mitotic figures with up to 5 mitoses / 10 HPF.  The tumor cells irregularly  infiltrate the surrounding stroma as nests and trabeculae of cells.  Some of the nests of cells demonstrate tissue retraction spaces and have a slight micropapillary appearance.  The surrounding stroma is desmoplastic and there are scattered chronic  inflammatory cells.  In some areas, the tumor cells are markedly attenuated with a surrounding markedly desmoplastic stroma.  This is interpreted as an invasive ductal carcinoma, [place] histologic grade 2 (3, 2, 2).  The tumor measures 5 mm in  greatest dimensions.  There is no  evidence of DCIS.  Immunostains for breast biomarkers and p63 are examined to further evaluate the tumor; the positive and negative IHC tissue controls stained appropriately.  The results of the immunostains on the tumor   cells are as follows:    ER:  Negative.  HER2:  Positive (score 3+).  Ki-67:  50-60%.  p63:  Negative for myoepithelial cells around all areas of invasive carcinoma with no evidence of DCIS.  Focal tumor cells demonstrate mild-to-moderate nuclear positivity.  CA:  Negative.    2. The left breast needle core biopsies from 12:00 o'clock are examined at multiple levels.  There is an invasive carcinoma which essentially has the same histopathologic features as the invasive tumor in specimen 1.  However, in these biopsies the tumor   cells are more attenuated with surrounding desmoplastic stroma and the tumor infiltrates as anastomosing cords and trabeculae of cells.  No micropapillary features are identified.  The tumor measures 10 mm in greatest dimension.  There is no evidence of   DCIS.  Immunostains for breast biomarkers and p63 are examined to further evaluate the tumor; the positive and negative IHC tissue controls stained appropriately.  The results of the immunostains are as follows:    ER:  Negative; less than 1% of tumor cells demonstrate weak nuclear staining.  HER2:  Positive (score 3+).  Ki-67:  70-80%.  p63:  Negative for myoepithelial cells around the invasive carcinoma.  Focal nests of tumor cells demonstrate a partial lining of myoepithelial cells; however, this is not interpreted as definitive DCIS.  CA:  Negative.    3. The left axillary needle core biopsies are examined at multiple levels.  Focally, there is a suggestion of a lymph node capsule and subcapsular sinus; however, this cannot be determined with certainty.  No benign breast tissue is identified.  The  tissue demonstrates a proliferation of malignant epithelial cells which in some areas are cuboidal in shape and  demonstrate the same histopathologic features as described in specimen 1.  However, in many areas, the surrounding stroma is markedly  desmoplastic and many of the cells become spindle-shaped and attenuated.  In these areas the tumor cells create a somewhat storiform appearance.  This is interpreted as metastatic ductal carcinoma of the breast; the tumor measures 10 mm in greatest  dimension.  Immunostains for AE1/AE3 and GATA3 are examined on blocks 3A and 3B as a precautionary measure; the positive and negative IHC tissue controls stained appropriately.  The immunostains for AE1/AE3 demonstrates strong cytoplasmic staining in the   tumor cells.  The immunostains for GATA3           Imaging   NM PET CT FDG SKULL BASE TO MID THIGH - 02/21/2025     CLINICAL HISTORY:  Breast cancer, invasive, stage I/II/III, initial workup; Malignant neoplasm of overlapping sites of left female breast     TECHNIQUE:  11.96 mCi of F18-FDG was administered intravenously in the left antecubital fossa.  After an approximately 60 min distribution time, PET/CT images were acquired from the skull base to the mid thigh. Transmission images were acquired to correct for attenuation using a whole body low-dose CT scan without contrast with the arms positioned above the head. Glycemia at the time of injection was 69 mg/dL.     COMPARISON:  None     FINDINGS:  Quality of the study: Adequate.     SUV max of the mediastinal blood pool is 1.9     Neck: No abnormal FDG avidity.  No lymphadenopathy or mass.     Chest: Multi centric biopsy-proven left breast cancer is markedly FDG avid with the upper inner quadrant mass having SUV max of 9.1 and the upper outer quadrant mass having SUV max of 11.0.  Two level 1 FDG avid left axillary metastatic lymph nodes with SUV max 5.2 and 4.6.  There is a single level 3 FDG avid left axillary lymph node medial to the pectoralis minor muscle measuring 8 mm with SUV max 4.     No pulmonary nodules or pleural  effusion.  No mediastinal or hilar lymphadenopathy or internal mammary chain FDG avid lymphadenopathy     Abdomen/pelvis: Enlarged liver with innumerable markedly FDG avid masses with SUV max 12.     Retroperitoneal left para-aortic FDG avid lymph node measures 19 x 7 mm with SUV max 5.8     Skeletal structures: No abnormal FDG avidity.  No focal lytic or sclerotic lesion in the osseous structures.     Physiologic uptake of the tracer is present within the brain, salivary glands, myocardium, GI and  tracts.     Incidental CT findings: N/A     Impression:     1. Extensive hepatic metastatic disease with innumerable metastatic masses throughout the liver all of which are markedly FDG avid.  2. FDG avid metastatic left para-aortic retroperitoneal lymph node in the upper abdomen.  3. Markedly FDG avid biopsy-proven multicentric left breast cancer with metastatic level 1 and level 3 left axillary lymphadenopathy.         Assessment and Plan   L Breast Invasive Ductal Carcinoma with Micropapillary Features (cT2,cN1,Mx),  G2, ki67 70-80%, -/-/+  PET-CT ordered to assess for distant metastasis -scheduled for 02/21/2025  I previously discussed in detail with the pt the role of medical oncology in her care.  I informed her that final treatment recommendations is based on the NCCN guidelines and given the size, hormonal makeup and pathologically proven lymph node involvement of her tumor, NA systemic therapy is recommended as NA systemic therapy is preferred for HER2-positive disease if >=cT2 or >=cN1 to downstage tumor prior to resection.  If she has metastatic disease chemotherapy will still be indicated however the goal of treatment would be palliative instead of curative intent.  She has already been referred to the genetic counselor for germline testing and NGS is pending on her biopsy specimen  Plan  Unfortunately, the PET-CT showed metastatic disease to the liver with diffuse liver metastasis  She received C1D1  Carbo/Taxotere 02/24/25 inpatient for cytoreduction  Today, she presents for C1D8 of Carbo/Taxotere but unfortunately has afebrile neutropenia, CINDI, and hypotension  I have recommended admission to the hospital for supportive care and on discharge will plan to resume treatment      CINDI  Hypotension  Chemotherapy Induced Diarrhea  Will treat in clinic with D5NS, Antimotlity agent and antiemetic  Patient to go to ED vs directly admitted to the hospital if possible        Mucositis due to Chemotherapy  Magic Mouth washed prescribed        Chemotherapy Induced Neutropenia  Patient afebrile  Hold chemotherapy; plan to proceed with targeted agents        Anxiety   I discussed with her her ileus related anxiety and counseled her take it 1 step at a time; she has already been prescribed diazepam which is helping her to sleep.  I think she will have much more relief once she has a definitive plan and received PET-CT results   Continue Diazepam LA  Also informed her that we do have counselors as well as a psychiatrist who specializes in counseling of patients diagnosed with cancer      Cancer Screening  PAP Smear 11/29/2022: Satisfactory for interpretation. Endocervical component is absent. Negative for intraepithelial lesion or malignancy.  Colonoscopy: Due      Chronic Medical Conditions  Depression   HTN  Urine fibroids        Med Onc Chart Routing      Follow up with physician 1 week.   Follow up with [facility]    Infusion scheduling note   Dispo: Infusion for IVF/Supportive Care.  Hopsital afterwords.   Injection scheduling note    Labs    Imaging    Pharmacy appointment    Other referrals                The patient was seen, interviewed and examined. Pertinent lab and radiologic studies were reviewed. Pt instructed to call should they develop concerning signs/symptoms or have further questions.        Portions of the record may have been created with voice recognition software. Occasional wrong-word or sound-a-like  substitutions may have occurred due to the inherent limitations of voice recognition software. Read the chart carefully and recognize, using context, where substitutions have occurred.      Niyah Esquivel MD    Hematology/Oncology              [1]   Social History  Tobacco Use    Smoking status: Never    Smokeless tobacco: Never   Substance Use Topics    Alcohol use: No    Drug use: No

## 2025-03-03 NOTE — ASSESSMENT & PLAN NOTE
Secondary to chemotherapy, s/p docetaxel on 2/24/25. Followed by oncology.    --Prophylactic antibiotics until ANC >1000  --Zosyn IV

## 2025-03-03 NOTE — SUBJECTIVE & OBJECTIVE
Past Medical History:   Diagnosis Date    Depression     controlled with meds    Fibroid December 2, 2022    Hypertension     It's controlled with meds.       Past Surgical History:   Procedure Laterality Date    FUNCTIONAL ENDOSCOPIC SINUS SURGERY (FESS) USING COMPUTER-ASSISTED NAVIGATION  02/2022       Review of patient's allergies indicates:   Allergen Reactions    Codeine Rash       Current Facility-Administered Medications on File Prior to Encounter   Medication    [COMPLETED] atropine injection 0.2 mg    [COMPLETED] ondansetron injection 8 mg    [COMPLETED] sodium chloride 0.9% bolus 1,000 mL 1,000 mL    [DISCONTINUED] atropine injection 0.2 mg    [DISCONTINUED] dextrose 5 % and 0.9 % NaCl infusion    [DISCONTINUED] heparin, porcine (PF) 100 unit/mL injection flush 500 Units     Current Outpatient Medications on File Prior to Encounter   Medication Sig    (Magic mouthwash) 1:1:1 diphenhydrAMINE(Benadryl) 12.5mg/5ml liq, aluminum & magnesium hydroxide-simethicone (Maalox), LIDOcaine viscous 2% Swish and spit 15 mLs every 4 (four) hours as needed (mouth&/or throat pain). for mouth sores    diazePAM (VALIUM) 5 MG tablet Take 5 mg by mouth every 12 (twelve) hours as needed.    diphenoxylate-atropine 2.5-0.025 mg (LOMOTIL) 2.5-0.025 mg per tablet Take 1 tablet by mouth 4 (four) times daily as needed for Diarrhea.    LIDOcaine-prilocaine (EMLA) cream Apply topically as needed.    lisinopril 10 MG tablet Take 10 mg by mouth once daily.    loperamide (IMODIUM) 2 mg capsule Take 1 capsule (2 mg total) by mouth every 3 (three) hours as needed for Diarrhea.    OLANZapine (ZYPREXA) 5 MG tablet Take 1 tablet (5 mg total) by mouth every evening.    ondansetron (ZOFRAN-ODT) 4 MG TbDL Take 1 tablet (4 mg total) by mouth every 8 (eight) hours as needed (nausea).    sertraline (ZOLOFT) 50 MG tablet Take 50 mg by mouth once daily.    traMADoL (ULTRAM) 50 mg tablet Take 1 tablet (50 mg total) by mouth every 12 (twelve) hours as  needed for Pain.     Family History       Problem Relation (Age of Onset)    Breast cancer Mother (63), Other (60 - 69)    HPV Daughter (23)    Hypertension Mother    Lung cancer Maternal Grandmother    Prostate cancer Father (70)    Skin cancer Paternal Grandfather    Thyroid disease Maternal Aunt          Tobacco Use    Smoking status: Never    Smokeless tobacco: Never   Substance and Sexual Activity    Alcohol use: No    Drug use: No    Sexual activity: Yes     Partners: Male     Birth control/protection: OCP     Review of Systems   Constitutional:  Positive for activity change, appetite change and fatigue.   HENT:  Positive for mouth sores and sore throat.    Cardiovascular:  Negative for chest pain.   Gastrointestinal:  Positive for abdominal distention, abdominal pain, diarrhea, nausea and vomiting.   Genitourinary:  Positive for vaginal bleeding.   Musculoskeletal:  Positive for arthralgias.   Skin:  Positive for color change.   Neurological:  Positive for weakness, light-headedness and headaches.   Psychiatric/Behavioral:  The patient is nervous/anxious.      Objective:     Vital Signs (Most Recent):  Pulse: 104 (03/03/25 1734)  Resp: 20 (03/03/25 1734)  BP: 126/60 (03/03/25 1734)  SpO2: 96 % (03/03/25 1734) Vital Signs (24h Range):  Temp:  [98 °F (36.7 °C)-98.2 °F (36.8 °C)] 98 °F (36.7 °C)  Pulse:  [] 104  Resp:  [16-20] 20  SpO2:  [96 %-99 %] 96 %  BP: ()/(55-72) 126/60     Weight: 67.6 kg (149 lb 0.5 oz) (taken today by RN but didn't cross over)  Body mass index is 27.26 kg/m².     Physical Exam  Vitals and nursing note reviewed.   Constitutional:       General: She is not in acute distress.     Appearance: She is normal weight. She is ill-appearing.   HENT:      Head: Normocephalic and atraumatic.      Right Ear: Hearing and external ear normal.      Left Ear: Hearing and external ear normal.      Nose: No rhinorrhea.      Right Sinus: No maxillary sinus tenderness or frontal sinus  tenderness.      Left Sinus: No maxillary sinus tenderness or frontal sinus tenderness.      Mouth/Throat:      Lips: Lesions present.      Mouth: Mucous membranes are dry. No oral lesions.      Pharynx: Uvula midline.      Comments: White film on tongue  Eyes:      General:         Right eye: No discharge.         Left eye: No discharge.      Conjunctiva/sclera: Conjunctivae normal.      Pupils: Pupils are equal, round, and reactive to light.   Neck:      Thyroid: No thyromegaly.      Vascular: No carotid bruit.      Trachea: No tracheal deviation.   Cardiovascular:      Rate and Rhythm: Normal rate and regular rhythm.      Pulses:           Dorsalis pedis pulses are 2+ on the right side and 2+ on the left side.      Heart sounds: Normal heart sounds, S1 normal and S2 normal. No murmur heard.  Pulmonary:      Effort: Pulmonary effort is normal. No respiratory distress.      Breath sounds: Normal breath sounds.   Abdominal:      General: Bowel sounds are decreased. There is distension.      Palpations: Abdomen is soft. There is fluid wave. There is no mass.      Tenderness: There is generalized abdominal tenderness.   Musculoskeletal:         General: Normal range of motion.      Cervical back: Normal range of motion.   Lymphadenopathy:      Cervical: No cervical adenopathy.      Upper Body:      Right upper body: No supraclavicular adenopathy.      Left upper body: No supraclavicular adenopathy.   Skin:     General: Skin is warm and dry.      Capillary Refill: Capillary refill takes less than 2 seconds.      Coloration: Skin is jaundiced.      Findings: No rash.   Neurological:      Mental Status: She is alert and oriented to person, place, and time.      Sensory: No sensory deficit.      Coordination: Coordination normal.      Gait: Gait normal.   Psychiatric:         Mood and Affect: Mood is not anxious or depressed.         Speech: Speech normal.         Behavior: Behavior normal.         Thought Content:  Thought content normal.         Judgment: Judgment normal.              CRANIAL NERVES     CN III, IV, VI   Pupils are equal, round, and reactive to light.       Significant Labs: All pertinent labs within the past 24 hours have been reviewed.  CBC:   Recent Labs   Lab 03/03/25  1248   WBC 1.22*   HGB 13.8   HCT 40.4        CMP:   Recent Labs   Lab 03/03/25  1248      K 5.1      CO2 19*   GLU 90   BUN 48*   CREATININE 2.0*   CALCIUM 7.9*   PROT 5.6*   ALBUMIN 2.0*   BILITOT 24.1*   ALKPHOS 613*   *   *   ANIONGAP 12       Significant Imaging: I have reviewed all pertinent imaging results/findings within the past 24 hours.

## 2025-03-03 NOTE — NURSING
Per Sissy with Dr Esquivel, patient to go to  registration and let them know that she have a reservation with house sup.  Patient and spouse verbalize understanding.  Dr. Esquivel wanted [facility] to R chest wall de accessed prior to discharge to hospital for direct admit.

## 2025-03-03 NOTE — NURSING
Charge CHERELLE Jo communicated with MD about IV fluids. Ok per MD to infuse NS 1000 ml over 1 hr only.

## 2025-03-03 NOTE — TELEPHONE ENCOUNTER
Interventional Radiology:    Spoke to pt and states that she has had no issues with her port placement site and the bandage is still on. Pt also states that she is having some issues with not being able to drink or eat and states that she is going to see her Dr today. Informed pt that if she does not start treatment tomorrow then to please come by and let one of the radiology nurse's remove the bandage and look at the site and pt verbalizes understanding.

## 2025-03-04 ENCOUNTER — TELEPHONE (OUTPATIENT)
Dept: HEMATOLOGY/ONCOLOGY | Facility: CLINIC | Age: 50
End: 2025-03-04
Payer: COMMERCIAL

## 2025-03-04 PROBLEM — K72.00 ACUTE LIVER FAILURE WITHOUT HEPATIC COMA: Status: ACTIVE | Noted: 2025-03-04

## 2025-03-04 NOTE — ASSESSMENT & PLAN NOTE
Lly will have some responsePatient appears to be better this morning is eating some Jell-O and states that she feels better very extensive involvement with disease process

## 2025-03-04 NOTE — PROCEDURES
"Serina Barriga is a 50 y.o. female patient.    Temp: 98.4 °F (36.9 °C) (03/04/25 0817)  Pulse: 97 (03/04/25 0817)  Resp: 19 (03/04/25 0817)  BP: (!) 104/49 (03/04/25 0817)  SpO2: 95 % (03/04/25 0817)  Weight: 67.6 kg (149 lb 0.5 oz) (03/03/25 1716)  Height: 5' 2" (157.5 cm) (03/03/25 1716)    PICC  Date/Time: 3/4/2025 9:15 AM  Performed by: Kedar Figueroa RN  Consent Done: Yes  Time out: Immediately prior to procedure a time out was called to verify the correct patient, procedure, equipment, support staff and site/side marked as required  Indications: med administration and vascular access  Anesthesia: local infiltration  Local anesthetic: lidocaine 1% without epinephrine  Anesthetic Total (mL): 2  Preparation: skin prepped with chlorhexidine (without alcohol)  Skin prep agent dried: skin prep agent completely dried prior to procedure  Sterile barriers: all five maximum sterile barriers used - cap, mask, sterile gown, sterile gloves, and large sterile sheet  Hand hygiene: hand hygiene performed prior to central venous catheter insertion  Location details: left basilic  Catheter type: double lumen  Catheter size: 4 Fr  Catheter Length: 36cm    Ultrasound guidance: yes  Vessel Caliber: medium and patent, compressibility normal  Needle advanced into vessel with real time Ultrasound guidance.  Guidewire confirmed in vessel.  Sterile sheath used.  Number of attempts: 1  Post-procedure: blood return through all ports, chlorhexidine patch and sterile dressing applied  Specimens: No  Implants: No  Comments: Awaiting xray for confirmation of placement           Kedar Figueroa RN  3/4/2025    "

## 2025-03-04 NOTE — ASSESSMENT & PLAN NOTE
Secondary to chemotherapy, s/p docetaxel on 2/24/25. Followed by oncology.    --Prophylactic antibiotics until ANC >1000  Empiric intravenous antibiotic(s) due to immunocompromised state  Urinalysis suggestive of acute cystitis  Last admission, urine culture negative   Blood cultures negative growth to date

## 2025-03-04 NOTE — SUBJECTIVE & OBJECTIVE
Interval History: See hospital course for today      Review of Systems   Constitutional:  Positive for activity change, appetite change (modestly improving) and fatigue.   HENT:  Positive for mouth sores and trouble swallowing.    Respiratory:  Negative for shortness of breath.    Gastrointestinal:  Positive for abdominal distention (improving), diarrhea, nausea and vomiting.   Skin:  Positive for color change.   Allergic/Immunologic: Positive for immunocompromised state.   Neurological:  Positive for weakness.   Psychiatric/Behavioral:  Positive for sleep disturbance. Negative for agitation, behavioral problems, confusion, decreased concentration and dysphoric mood. The patient is nervous/anxious.      Objective:     Vital Signs (Most Recent):  Temp: 98.4 °F (36.9 °C) (03/04/25 1201)  Pulse: 102 (03/04/25 1201)  Resp: 19 (03/04/25 1201)  BP: (!) 109/59 (03/04/25 1201)  SpO2: 97 % (03/04/25 1201) Vital Signs (24h Range):  Temp:  [97.8 °F (36.6 °C)-98.5 °F (36.9 °C)] 98.4 °F (36.9 °C)  Pulse:  [] 102  Resp:  [16-20] 19  SpO2:  [95 %-100 %] 97 %  BP: ()/(42-72) 109/59     Weight: 67.6 kg (149 lb 0.5 oz)  Body mass index is 27.26 kg/m².    Intake/Output Summary (Last 24 hours) at 3/4/2025 1253  Last data filed at 3/4/2025 0534  Gross per 24 hour   Intake 986.58 ml   Output --   Net 986.58 ml         Physical Exam  Vitals and nursing note reviewed. Exam conducted with a chaperone present (nursing, ).   Constitutional:       General: She is not in acute distress.     Appearance: She is ill-appearing. She is not toxic-appearing.   HENT:      Head: Normocephalic and atraumatic.   Eyes:      General: Scleral icterus present.   Cardiovascular:      Rate and Rhythm: Tachycardia present.   Pulmonary:      Effort: Pulmonary effort is normal. No respiratory distress.   Chest:       Abdominal:      General: There is distension.      Palpations: Abdomen is soft.      Tenderness: There is no abdominal tenderness.    Musculoskeletal:      Right lower leg: No edema.      Left lower leg: No edema.   Skin:     General: Skin is warm.      Coloration: Skin is jaundiced.   Neurological:      Mental Status: She is alert and oriented to person, place, and time.      Motor: Weakness present.             Significant Labs: All pertinent labs within the past 24 hours have been reviewed.  Bilirubin:   Recent Labs   Lab 02/24/25  0938 02/25/25  0419 02/26/25  0424 03/03/25  1248 03/03/25 1844 03/04/25  0527   BILIDIR >14.0*  --   --   --  >14.0*  --    BILITOT 22.4* 19.1* 21.3* 24.1* 24.7* 22.7*     Blood Culture:   Recent Labs   Lab 03/03/25 1943   LABBLOO No Growth to date  No Growth to date     CBC:   Recent Labs   Lab 03/03/25  1248 03/03/25 1844 03/04/25  0527   WBC 1.22* 0.74* 1.05*   HGB 13.8 13.2 11.7*   HCT 40.4 36.4* 33.0*    159 149*     CMP:   Recent Labs   Lab 03/03/25  1248 03/03/25 1844 03/04/25  0527    138 140   K 5.1 4.5 4.4    111* 117*   CO2 19* 17* 16*   GLU 90 109 100   BUN 48* 47* 39*   CREATININE 2.0* 1.6* 1.3   CALCIUM 7.9* 7.4* 6.7*   PROT 5.6* 5.3* 4.7*   ALBUMIN 2.0* 2.0* 1.7*   BILITOT 24.1* 24.7* 22.7*   ALKPHOS 613* 575* 552*   * 590* 515*   * 143* 122*   ANIONGAP 12 10 7*     Coagulation:   Recent Labs   Lab 03/04/25  0800   INR 2.2*     Lactic Acid:   Recent Labs   Lab 03/03/25 1844   LACTATE 0.9     Urine Studies:   Recent Labs   Lab 03/03/25 1937   COLORU Yellow   APPEARANCEUA Hazy*   PHUR >8.0*   SPECGRAV 1.015   PROTEINUA Trace*   GLUCUA Negative   KETONESU Negative   BILIRUBINUA 3+*   OCCULTUA 3+*   NITRITE Positive*   UROBILINOGEN Negative   LEUKOCYTESUR Negative   RBCUA 5*   WBCUA 7*   BACTERIA Rare   HYALINECASTS 2*     Ammonia 42  Procalcitonin 1.64    Significant Imaging: I have reviewed all pertinent imaging results/findings within the past 24 hours.  CXR: I have reviewed all pertinent results/findings within the past 24 hours and my personal findings are:   lungs clear, verifies picc line placement

## 2025-03-04 NOTE — ASSESSMENT & PLAN NOTE
Patient with visceral crisis HER2 positive breast cancer treated with carboplatin and Taxotere.  As an inpatient patient is currently pancytopenic at the present time secondary to drug very extensive conversation with the patient as well as her  at bedside clearly understands she has stage IV disease noncurable in nature.  At the request of Hospital Medicine I have spoken to them concerning this awaiting recovery of counts in outpatient setting would consider treatment with Herceptin and Perjeta at that particular point discussed implications and answered questions with them understanding of the gravity of this situation I offered this show them the extent of her disease on PET scan which she declined at the present time

## 2025-03-04 NOTE — TELEPHONE ENCOUNTER
Pt's  called [facility] and requested to have Dr Lopez call them to confirm some things. Msg sent to Dr Lopez.

## 2025-03-04 NOTE — PLAN OF CARE
Discussed poc with pt, pt verbalized understanding    Purposeful rounding every 2hours    VS wnl  Cardiac monitoring in use, pt is NSR, tele monitor # [id number]  Fall precautions in place, remains injury free  Pain and nausea under control with PRN meds    IVFs  Abx given as prescribed  Bed locked at lowest position  Call light within reach    Chart check complete  Will cont with POC

## 2025-03-04 NOTE — ASSESSMENT & PLAN NOTE
CINDI is likely due to pre-renal azotemia due to dehydration. Baseline creatinine is  1.0 . Most recent creatinine and eGFR are listed below.  Recent Labs     03/03/25  1248 03/03/25  1844 03/04/25  0527   CREATININE 2.0* 1.6* 1.3   EGFRNORACEVR 30* 39* 50*        Plan  - CINDI is improving  - Avoid nephrotoxins and renally dose meds for GFR listed above  - Monitor urine output, serial BMP, and adjust therapy as needed

## 2025-03-04 NOTE — H&P
[facility] Medicine  History & Physical    Patient Name: Serina Barriga  MRN: [id number]  Patient Class: IP- Inpatient  Admission Date: 3/3/2025  Attending Physician: Da Carranza MD   Primary Care Provider: Obed Alford MD         Patient information was obtained from patient, spouse/SO, past medical records, and ER records.     Subjective:     Principal Problem:Neutropenia    Chief Complaint: No chief complaint on file.       HPI: 50 year old female, comorbid conditions include Breast Cancer, Transaminitis, hyperbilirubinemia. Presented to Oncology clinic with c/o nausea/vomiting, diarrhea. Patient s/p first cycle chemotherapy on  with docetaxel. Planned for anti-her2 therapy as OP. Patient has known lesions to liver, MRI liver on  did not show obstructive process. Bili has been elevated in the range of 19-24 since diagnosis and has been jaundiced. In the oncology clinic: BP: 88/55, improved to 113/72 after IV fluids. Significant Labs: WBC: 1.22, CO2: 19, Cr: 2.0, Ca: 7.9, Ma.9, ALP: 613, Bili: 24.1, AST: 630, ALT: 149. Patient treated with IV fluids, anti-emetics in infusion center. Oncology recommended admission. Patient is a full code. Admitted to Hospital Medicine for management of Neutropenia, Intractable N/V, CINDI, Hyperbilirubinemia.     Past Medical History:   Diagnosis Date    Depression     controlled with meds    Fibroid 2022    Hypertension     It's controlled with meds.       Past Surgical History:   Procedure Laterality Date    FUNCTIONAL ENDOSCOPIC SINUS SURGERY (FESS) USING COMPUTER-ASSISTED NAVIGATION  2022       Review of patient's allergies indicates:   Allergen Reactions    Codeine Rash       Current Facility-Administered Medications on File Prior to Encounter   Medication    [COMPLETED] atropine injection 0.2 mg    [COMPLETED] ondansetron injection 8 mg    [COMPLETED] sodium chloride 0.9% bolus 1,000 mL 1,000 mL    [DISCONTINUED] atropine  injection 0.2 mg    [DISCONTINUED] dextrose 5 % and 0.9 % NaCl infusion    [DISCONTINUED] heparin, porcine (PF) 100 unit/mL injection flush 500 Units     Current Outpatient Medications on File Prior to Encounter   Medication Sig    (Magic mouthwash) 1:1:1 diphenhydrAMINE(Benadryl) 12.5mg/5ml liq, aluminum & magnesium hydroxide-simethicone (Maalox), LIDOcaine viscous 2% Swish and spit 15 mLs every 4 (four) hours as needed (mouth&/or throat pain). for mouth sores    diazePAM (VALIUM) 5 MG tablet Take 5 mg by mouth every 12 (twelve) hours as needed.    diphenoxylate-atropine 2.5-0.025 mg (LOMOTIL) 2.5-0.025 mg per tablet Take 1 tablet by mouth 4 (four) times daily as needed for Diarrhea.    LIDOcaine-prilocaine (EMLA) cream Apply topically as needed.    lisinopril 10 MG tablet Take 10 mg by mouth once daily.    loperamide (IMODIUM) 2 mg capsule Take 1 capsule (2 mg total) by mouth every 3 (three) hours as needed for Diarrhea.    OLANZapine (ZYPREXA) 5 MG tablet Take 1 tablet (5 mg total) by mouth every evening.    ondansetron (ZOFRAN-ODT) 4 MG TbDL Take 1 tablet (4 mg total) by mouth every 8 (eight) hours as needed (nausea).    sertraline (ZOLOFT) 50 MG tablet Take 50 mg by mouth once daily.    traMADoL (ULTRAM) 50 mg tablet Take 1 tablet (50 mg total) by mouth every 12 (twelve) hours as needed for Pain.     Family History       Problem Relation (Age of Onset)    Breast cancer Mother (63), Other (60 - 69)    HPV Daughter (23)    Hypertension Mother    Lung cancer Maternal Grandmother    Prostate cancer Father (70)    Skin cancer Paternal Grandfather    Thyroid disease Maternal Aunt          Tobacco Use    Smoking status: Never    Smokeless tobacco: Never   Substance and Sexual Activity    Alcohol use: No    Drug use: No    Sexual activity: Yes     Partners: Male     Birth control/protection: OCP     Review of Systems   Constitutional:  Positive for activity change, appetite change and fatigue.   HENT:  Positive for  mouth sores and sore throat.    Cardiovascular:  Negative for chest pain.   Gastrointestinal:  Positive for abdominal distention, abdominal pain, diarrhea, nausea and vomiting.   Genitourinary:  Positive for vaginal bleeding.   Musculoskeletal:  Positive for arthralgias.   Skin:  Positive for color change.   Neurological:  Positive for weakness, light-headedness and headaches.   Psychiatric/Behavioral:  The patient is nervous/anxious.      Objective:     Vital Signs (Most Recent):  Pulse: 104 (03/03/25 1734)  Resp: 20 (03/03/25 1734)  BP: 126/60 (03/03/25 1734)  SpO2: 96 % (03/03/25 1734) Vital Signs (24h Range):  Temp:  [98 °F (36.7 °C)-98.2 °F (36.8 °C)] 98 °F (36.7 °C)  Pulse:  [] 104  Resp:  [16-20] 20  SpO2:  [96 %-99 %] 96 %  BP: ()/(55-72) 126/60     Weight: 67.6 kg (149 lb 0.5 oz) (taken today by RN but didn't cross over)  Body mass index is 27.26 kg/m².     Physical Exam  Vitals and nursing note reviewed.   Constitutional:       General: She is not in acute distress.     Appearance: She is normal weight. She is ill-appearing.   HENT:      Head: Normocephalic and atraumatic.      Right Ear: Hearing and external ear normal.      Left Ear: Hearing and external ear normal.      Nose: No rhinorrhea.      Right Sinus: No maxillary sinus tenderness or frontal sinus tenderness.      Left Sinus: No maxillary sinus tenderness or frontal sinus tenderness.      Mouth/Throat:      Lips: Lesions present.      Mouth: Mucous membranes are dry. No oral lesions.      Pharynx: Uvula midline.      Comments: White film on tongue  Eyes:      General:         Right eye: No discharge.         Left eye: No discharge.      Conjunctiva/sclera: Conjunctivae normal.      Pupils: Pupils are equal, round, and reactive to light.   Neck:      Thyroid: No thyromegaly.      Vascular: No carotid bruit.      Trachea: No tracheal deviation.   Cardiovascular:      Rate and Rhythm: Normal rate and regular rhythm.      Pulses:            Dorsalis pedis pulses are 2+ on the right side and 2+ on the left side.      Heart sounds: Normal heart sounds, S1 normal and S2 normal. No murmur heard.  Pulmonary:      Effort: Pulmonary effort is normal. No respiratory distress.      Breath sounds: Normal breath sounds.   Abdominal:      General: Bowel sounds are decreased. There is distension.      Palpations: Abdomen is soft. There is fluid wave. There is no mass.      Tenderness: There is generalized abdominal tenderness.   Musculoskeletal:         General: Normal range of motion.      Cervical back: Normal range of motion.   Lymphadenopathy:      Cervical: No cervical adenopathy.      Upper Body:      Right upper body: No supraclavicular adenopathy.      Left upper body: No supraclavicular adenopathy.   Skin:     General: Skin is warm and dry.      Capillary Refill: Capillary refill takes less than 2 seconds.      Coloration: Skin is jaundiced.      Findings: No rash.   Neurological:      Mental Status: She is alert and oriented to person, place, and time.      Sensory: No sensory deficit.      Coordination: Coordination normal.      Gait: Gait normal.   Psychiatric:         Mood and Affect: Mood is not anxious or depressed.         Speech: Speech normal.         Behavior: Behavior normal.         Thought Content: Thought content normal.         Judgment: Judgment normal.              CRANIAL NERVES     CN III, IV, VI   Pupils are equal, round, and reactive to light.       Significant Labs: All pertinent labs within the past 24 hours have been reviewed.  CBC:   Recent Labs   Lab 03/03/25  1248   WBC 1.22*   HGB 13.8   HCT 40.4        CMP:   Recent Labs   Lab 03/03/25  1248      K 5.1      CO2 19*   GLU 90   BUN 48*   CREATININE 2.0*   CALCIUM 7.9*   PROT 5.6*   ALBUMIN 2.0*   BILITOT 24.1*   ALKPHOS 613*   *   *   ANIONGAP 12       Significant Imaging: I have reviewed all pertinent imaging results/findings within the past 24  hours.  Assessment/Plan:     * Neutropenia  Secondary to chemotherapy, s/p docetaxel on 2/24/25. Followed by oncology.    --Prophylactic antibiotics until ANC >1000  --Zosyn IV      CINDI (acute kidney injury)  CINDI is likely due to pre-renal azotemia due to dehydration. Baseline creatinine is  1.0 . Most recent creatinine and eGFR are listed below.  Recent Labs     03/03/25  1248   CREATININE 2.0*   EGFRNORACEVR 30*      Plan  - CINDI is worsening. Will continue current treatment  - Avoid nephrotoxins and renally dose meds for GFR listed above  - Monitor urine output, serial BMP, and adjust therapy as needed      Intractable nausea and vomiting  Secondary to chemotherapy, patient and family report inadequate management of nausea and vomiting at home    --anti-emetics PRN per MAR  --IV fluids  --clear liquids as tolerated      Chemotherapy induced diarrhea  Diarrhea present since chemotherapy    --rule out c diff prior to using anti-diarrheals      Malignant neoplasm of overlapping sites of left breast in female, estrogen receptor negative  Followed by Oncology, known metastasis to liver, treated with Docetaxel on 2/24, planned for Anti-Her2 therapy as OP.     --Consult oncology if clinically indicated      Transaminitis  LFTs elevated since prior to chemotherapy secondary to liver metastasis  MRI on 2/24 did not show obstructive process    --Daily CMP      Primary hypertension  Patient's blood pressure range in the last 24 hours was: BP  Min: 83/55  Max: 126/60.The patient's inpatient anti-hypertensive regimen is listed below:  Current Antihypertensives       Plan  - BP is uncontrolled, will adjust as follows: hold anti-hypertensive medications for now        VTE Risk Mitigation (From admission, onward)           Ordered     enoxaparin injection 40 mg  Daily         03/03/25 1656     IP VTE HIGH RISK PATIENT  Once         03/03/25 1656     Place sequential compression device  Until discontinued         03/03/25 1656                                     Nela Mckeon NP  Department of Hospital Medicine  [facility]

## 2025-03-04 NOTE — ASSESSMENT & PLAN NOTE
Secondary to chemotherapy explain the situation in the carlos demonstrating that drug is active in her system.  Hopefully Downs Cancer cells as well as normal bone marrow

## 2025-03-04 NOTE — PLAN OF CARE
Discussed poc with pt, pt verbalized understanding    Purposeful rounding every 2hours    VS wnl    Fall precautions in place, remains injury free  PRN meds help with Pain and nausea    IVFs  Accurate I&Os  Abx given as prescribed  Bed locked at lowest position  Call light within reach    Chart check complete  Will cont with POC

## 2025-03-04 NOTE — PROGRESS NOTES
Formerly named [facility] Medicine  Progress Note    Patient Name: Serina aBrriga  MRN: [id number]  Patient Class: IP- Inpatient   Admission Date: 3/3/2025  Length of Stay: 1 days  Attending Physician: Da Carranza MD  Primary Care Provider: Obed Alford MD        Subjective     Principal Problem:Malignant neoplasm of overlapping sites of left breast in female, estrogen receptor negative        HPI:  50 year old female, comorbid conditions include Breast Cancer, Transaminitis, hyperbilirubinemia. Presented to Oncology clinic with c/o nausea/vomiting, diarrhea. Patient s/p first cycle chemotherapy on  with docetaxel. Planned for anti-her2 therapy as OP. Patient has known lesions to liver, MRI liver on  did not show obstructive process. Bili has been elevated in the range of 19-24 since diagnosis and has been jaundiced. In the oncology clinic: BP: 88/55, improved to 113/72 after IV fluids. Significant Labs: WBC: 1.22, CO2: 19, Cr: 2.0, Ca: 7.9, Ma.9, ALP: 613, Bili: 24.1, AST: 630, ALT: 149. Patient treated with IV fluids, anti-emetics in infusion center. Oncology recommended admission. Patient is a full code. Admitted to Hospital Medicine for management of Neutropenia, Intractable N/V, CINDI, Hyperbilirubinemia.     Overview/Hospital Course:  3/4 direct admit from clinic by primary oncologist due to intractable nausea, vomiting, and diarrhea. Received chemotherapy last hospitalization, -. Elevated liver enzymes worsening 2/2 visceral crisis. Patient endorses improvement in symptoms of nausea and vomiting.     Interval History: See hospital course for today      Review of Systems   Constitutional:  Positive for activity change, appetite change (modestly improving) and fatigue.   HENT:  Positive for mouth sores and trouble swallowing.    Respiratory:  Negative for shortness of breath.    Gastrointestinal:  Positive for abdominal distention (improving), diarrhea, nausea and vomiting.   Skin:   Positive for color change.   Allergic/Immunologic: Positive for immunocompromised state.   Neurological:  Positive for weakness.   Psychiatric/Behavioral:  Positive for sleep disturbance. Negative for agitation, behavioral problems, confusion, decreased concentration and dysphoric mood. The patient is nervous/anxious.      Objective:     Vital Signs (Most Recent):  Temp: 98.4 °F (36.9 °C) (03/04/25 1201)  Pulse: 102 (03/04/25 1201)  Resp: 19 (03/04/25 1201)  BP: (!) 109/59 (03/04/25 1201)  SpO2: 97 % (03/04/25 1201) Vital Signs (24h Range):  Temp:  [97.8 °F (36.6 °C)-98.5 °F (36.9 °C)] 98.4 °F (36.9 °C)  Pulse:  [] 102  Resp:  [16-20] 19  SpO2:  [95 %-100 %] 97 %  BP: ()/(42-72) 109/59     Weight: 67.6 kg (149 lb 0.5 oz)  Body mass index is 27.26 kg/m².    Intake/Output Summary (Last 24 hours) at 3/4/2025 1253  Last data filed at 3/4/2025 0534  Gross per 24 hour   Intake 986.58 ml   Output --   Net 986.58 ml         Physical Exam  Vitals and nursing note reviewed. Exam conducted with a chaperone present (nursing, ).   Constitutional:       General: She is not in acute distress.     Appearance: She is ill-appearing. She is not toxic-appearing.   HENT:      Head: Normocephalic and atraumatic.   Eyes:      General: Scleral icterus present.   Cardiovascular:      Rate and Rhythm: Tachycardia present.   Pulmonary:      Effort: Pulmonary effort is normal. No respiratory distress.   Chest:       Abdominal:      General: There is distension.      Palpations: Abdomen is soft.      Tenderness: There is no abdominal tenderness.   Musculoskeletal:      Right lower leg: No edema.      Left lower leg: No edema.   Skin:     General: Skin is warm.      Coloration: Skin is jaundiced.   Neurological:      Mental Status: She is alert and oriented to person, place, and time.      Motor: Weakness present.             Significant Labs: All pertinent labs within the past 24 hours have been reviewed.  Bilirubin:   Recent  Labs   Lab 02/24/25  0938 02/25/25  0419 02/26/25  0424 03/03/25  1248 03/03/25  1844 03/04/25  0527   BILIDIR >14.0*  --   --   --  >14.0*  --    BILITOT 22.4* 19.1* 21.3* 24.1* 24.7* 22.7*     Blood Culture:   Recent Labs   Lab 03/03/25 1943   LABBLOO No Growth to date  No Growth to date     CBC:   Recent Labs   Lab 03/03/25 1248 03/03/25 1844 03/04/25  0527   WBC 1.22* 0.74* 1.05*   HGB 13.8 13.2 11.7*   HCT 40.4 36.4* 33.0*    159 149*     CMP:   Recent Labs   Lab 03/03/25 1248 03/03/25 1844 03/04/25  0527    138 140   K 5.1 4.5 4.4    111* 117*   CO2 19* 17* 16*   GLU 90 109 100   BUN 48* 47* 39*   CREATININE 2.0* 1.6* 1.3   CALCIUM 7.9* 7.4* 6.7*   PROT 5.6* 5.3* 4.7*   ALBUMIN 2.0* 2.0* 1.7*   BILITOT 24.1* 24.7* 22.7*   ALKPHOS 613* 575* 552*   * 590* 515*   * 143* 122*   ANIONGAP 12 10 7*     Coagulation:   Recent Labs   Lab 03/04/25  0800   INR 2.2*     Lactic Acid:   Recent Labs   Lab 03/03/25 1844   LACTATE 0.9     Urine Studies:   Recent Labs   Lab 03/03/25 1937   COLORU Yellow   APPEARANCEUA Hazy*   PHUR >8.0*   SPECGRAV 1.015   PROTEINUA Trace*   GLUCUA Negative   KETONESU Negative   BILIRUBINUA 3+*   OCCULTUA 3+*   NITRITE Positive*   UROBILINOGEN Negative   LEUKOCYTESUR Negative   RBCUA 5*   WBCUA 7*   BACTERIA Rare   HYALINECASTS 2*     Ammonia 42  Procalcitonin 1.64    Significant Imaging: I have reviewed all pertinent imaging results/findings within the past 24 hours.  CXR: I have reviewed all pertinent results/findings within the past 24 hours and my personal findings are:  lungs clear, verifies picc line placement    Assessment and Plan     * Malignant neoplasm of overlapping sites of left breast in female, estrogen receptor negative  Followed by Oncology, known metastasis to liver, treated with Docetaxel on 2/24, planned for Anti-Her2 therapy as OP.     Oncology consulted        Acute liver failure without hepatic coma  Patient is likely to have  ischemic hepatitis as evidenced by abnormalities in AST, ALT, T. BILI, ALBUMIN, and INR. Etiology includes  multifactorial visceral crisis from metastatic breast cancer to liver, drug induced liver injury, chemotherapy . Continue to monitor liver function while treating underlying condition. Daily labs to include CMP and PT/INR.    AST   Date Value Ref Range Status   03/04/2025 515 (H) 10 - 40 U/L Final     ALT   Date Value Ref Range Status   03/04/2025 122 (H) 10 - 44 U/L Final     Consulted transfer center for higher level of care for hepatology service  Discussed with hem/onc and higher level of service not warranted      CINDI (acute kidney injury)  CINDI is likely due to pre-renal azotemia due to dehydration. Baseline creatinine is  1.0 . Most recent creatinine and eGFR are listed below.  Recent Labs     03/03/25  1248 03/03/25  1844 03/04/25  0527   CREATININE 2.0* 1.6* 1.3   EGFRNORACEVR 30* 39* 50*        Plan  - CINDI is improving  - Avoid nephrotoxins and renally dose meds for GFR listed above  - Monitor urine output, serial BMP, and adjust therapy as needed      Neutropenia  Secondary to chemotherapy, s/p docetaxel on 2/24/25. Followed by oncology.    --Prophylactic antibiotics until ANC >1000  Empiric intravenous antibiotic(s) due to immunocompromised state  Urinalysis suggestive of acute cystitis  Last admission, urine culture negative   Blood cultures negative growth to date       Intractable nausea and vomiting  Secondary to chemotherapy, patient and family report inadequate management of nausea and vomiting at home    --anti-emetics PRN per MAR  --IV fluids  Advance diet as tolerated       Chemotherapy induced diarrhea  Diarrhea present since chemotherapy    Cdiff negative       Transaminitis  LFTs elevated since prior to chemotherapy secondary to liver metastasis  MRI on 2/24 did not show obstructive process  Remains elevated in setting visceral crisis and hepatic involvement of metastatic breast  cancer  Initiated transfer to higher level of care due to no hepatology service  Discussed with hem/onc and does not recommend    Primary hypertension  Patient's blood pressure range in the last 24 hours was: BP  Min: 83/55  Max: 126/60.The patient's inpatient anti-hypertensive regimen is listed below:  Current Antihypertensives       Plan  - BP is uncontrolled, will adjust as follows: hold anti-hypertensive medications for now        VTE Risk Mitigation (From admission, onward)           Ordered     IP VTE HIGH RISK PATIENT  Once         03/03/25 1656     Place sequential compression device  Until discontinued         03/03/25 1656                    Discharge Planning   ESTHELA: 3/4/2025     Code Status: Full Code   Medical Readiness for Discharge Date: 3/4/2025  Discharge Plan A: Home with family   Discharge Delays: None known at this time                    Da Carranza MD  Department of Hospital Medicine   [facility]

## 2025-03-04 NOTE — HOSPITAL COURSE
3/4 direct admit from clinic by primary oncologist due to intractable nausea, vomiting, and diarrhea. Received chemotherapy last hospitalization, 2/24-2/26. Elevated liver enzymes worsening 2/2 visceral crisis. Patient endorses improvement in symptoms of nausea and vomiting.   3/5 endorses improvement in symptoms and would like to advance diet. Reports soft stool. Continue supportive care. Decrease intravenous fluid rate and intravenous antibiotic(s)   3/6 improvement with appetite but complains of mucositis. Good rest last night. Complains of loose stool. Wants to go home tomorrow. Neutropenia improving. Continue adjusting supportive care, discontinue intravenous antibiotic(s), intravenous fluids, start imodium, and add nystatin. Correct k     3/7    Wbc levels improved to normal. Endorses improvement in symptoms. Main complaint is mucositis. Discussed options for relief. Follow up with primary oncologist. After discussing with oncology, ok to discharge home with follow up with primary oncologist. Received empiric zosyn and rocephin for immunocompromised state. Discontinued due to diarrhea. Cdiff negative.     Hypokalemia improved. Diet recommendations provided on discharge.     Insomnia improved with ambien. Discussed risk v benefits. Defer to primary providers for medication management.    Hypotension. Holding blood pressure medication(s) on discharge.     Creatinine elevated on admission. Improved with intravenous fluids. gfr stable on discharge. At risk for worsening due to poor po intake, nausea, vomiting, diarrhea, and overall health. Encouraged po intake with milk, sprite, ice cubes. Discussed supportive management with zofran and lomotil.    Transaminitis elevated but stable.    No acute distress. No respiratory distress. On room air. Jaundice. Moist oral mucosa. Scleral icterus. Ill appearing. Weakness. AO3.  at bedside.    Patient seen and evaluated by me. Patient was determined to be suitable for  discharge. Patient deemed stable for discharge to home.

## 2025-03-04 NOTE — PLAN OF CARE
[facility]  Initial Discharge Assessment       Primary Care Provider: Obed Alford MD    Admission Diagnosis: Transaminitis [R74.01]  Intractable diarrhea [R19.7]  Hypotension [I95.9]  Neutropenia [D70.9]    Admission Date: 3/3/2025  Expected Discharge Date: Per Attending     Transition of Care Barriers: None    Payor: [employer] CR[employer] BLUE [employer]IELD / Plan: [employer] OF [place] / Product Type: Commercial /     Extended Emergency Contact Information  Primary Emergency Contact: Bakari Barriga  Address: [address] [place] [address]           [place] [address] [facility]  Home Phone: [phone]  Mobile Phone: [phone]  Relation: Spouse    Discharge Plan A: Home with family         [id number] [place] - [address]  [address] [facility]  [place] [id number]  Phone: [phone] Fax: [phone]      Initial Assessment (most recent)       Adult Discharge Assessment - 03/04/25 0922          Discharge Assessment    Assessment Type Discharge Planning Assessment     Confirmed/corrected address, phone number and insurance Yes     Confirmed Demographics Correct on Facesheet     Source of Information health record     Communicated ESTHELA with patient/caregiver Date not available/Unable to determine     Reason For Admission acute liver failure without hepatic coma     People in Home child(renato), dependent;spouse     Do you expect to return to your current living situation? Yes     Do you have help at home or someone to help you manage your care at home? Yes     Who are your caregiver(s) and their phone number(s)? family     Prior to hospitilization cognitive status: Alert/Oriented     Current cognitive status: Alert/Oriented     Walking or Climbing Stairs Difficulty no     Dressing/Bathing Difficulty no     Home Layout Able to live on 1st floor     Equipment Currently Used at Home none     Readmission within 30 days? No     Patient currently being followed by outpatient case  management? No     Do you currently have service(s) that help you manage your care at home? No     Do you take prescription medications? Yes     Do you have prescription coverage? Yes     Coverage BCBS     Do you have any problems affording any of your prescribed medications? No     Is the patient taking medications as prescribed? yes     Who is going to help you get home at discharge? spouse     How do you get to doctors appointments? car, drives self;family or friend will provide     Are you on dialysis? No     Do you take coumadin? No     Discharge Plan A Home with family     DME Needed Upon Discharge  none     Transition of Care Barriers None                   Patient has no d/c needs at this time. Sw to follow up, as needed, for d/c planning purposes.

## 2025-03-04 NOTE — SUBJECTIVE & OBJECTIVE
Oncology Treatment Plan:   OP BREAST DOCEtaxel trastuzumab pertuzumab Q3W    Medications:  Continuous Infusions:   0.9% NaCl   Intravenous Continuous 125 mL/hr at 03/04/25 0534 Rate Verify at 03/04/25 0534     Scheduled Meds:   mupirocin   Nasal BID    piperacillin-tazobactam (Zosyn) IV (PEDS and ADULTS) (extended infusion is not appropriate)  4.5 g Intravenous Q8H     PRN Meds:  Current Facility-Administered Medications:     aluminum-magnesium hydroxide-simethicone, 30 mL, Oral, QID PRN    dextrose 50%, 12.5 g, Intravenous, PRN    dextrose 50%, 25 g, Intravenous, PRN    glucagon (human recombinant), 1 mg, Intramuscular, PRN    glucose, 16 g, Oral, PRN    glucose, 24 g, Oral, PRN    magic mouthwash (diphenhydrAMINE 12.5 mg/5 mL 20 mL, aluminum & magnesium hydroxide-simethicone (MYLANTA) 20 mL, LIDOcaine HCl 2% (XYLOCAINE) 20 mL) solution, 15 mL, Swish & Spit, Q4H PRN    melatonin, 6 mg, Oral, Nightly PRN    morphine, 2 mg, Intravenous, Q4H PRN    naloxone, 0.02 mg, Intravenous, PRN    ondansetron, 8 mg, Intravenous, Q8H PRN    polyethylene glycol, 17 g, Oral, BID PRN    promethazine (PHENERGAN) 12.5 mg in 0.9% NaCl 50 mL IVPB, 12.5 mg, Intravenous, Q6H PRN    sodium chloride 0.9%, 10 mL, Intravenous, Q12H PRN    traMADoL, 50 mg, Oral, Q6H PRN     Review of patient's allergies indicates:   Allergen Reactions    Codeine Rash        Past Medical History:   Diagnosis Date    Depression     controlled with meds    Fibroid December 2, 2022    Hypertension     It's controlled with meds.     Past Surgical History:   Procedure Laterality Date    FUNCTIONAL ENDOSCOPIC SINUS SURGERY (FESS) USING COMPUTER-ASSISTED NAVIGATION  02/2022     Family History       Problem Relation (Age of Onset)    Breast cancer Mother (63), Other (60 - 69)    HPV Daughter (23)    Hypertension Mother    Lung cancer Maternal Grandmother    Prostate cancer Father (70)    Skin cancer Paternal Grandfather    Thyroid disease Maternal Aunt          Tobacco  Use    Smoking status: Never    Smokeless tobacco: Never   Substance and Sexual Activity    Alcohol use: No    Drug use: No    Sexual activity: Yes     Partners: Male     Birth control/protection: OCP       Review of Systems   Constitutional:  Positive for activity change, appetite change and fatigue. Negative for chills, diaphoresis, fever and unexpected weight change.   HENT:  Negative for congestion, dental problem, drooling, ear discharge, ear pain, facial swelling, hearing loss, mouth sores, nosebleeds, postnasal drip, rhinorrhea, sinus pressure, sneezing, sore throat, tinnitus, trouble swallowing and voice change.    Eyes:  Negative for photophobia, pain, discharge, redness, itching and visual disturbance.   Respiratory:  Negative for cough, choking, chest tightness, shortness of breath, wheezing and stridor.    Cardiovascular:  Negative for chest pain, palpitations and leg swelling.   Gastrointestinal:  Positive for nausea. Negative for abdominal distention, abdominal pain, anal bleeding, blood in stool, constipation, diarrhea, rectal pain and vomiting.   Endocrine: Negative for cold intolerance, heat intolerance, polydipsia, polyphagia and polyuria.   Genitourinary:  Negative for decreased urine volume, difficulty urinating, dyspareunia, dysuria, enuresis, flank pain, frequency, genital sores, hematuria, menstrual problem, pelvic pain, urgency, vaginal bleeding, vaginal discharge and vaginal pain.   Musculoskeletal:  Negative for arthralgias, back pain, gait problem, joint swelling, myalgias, neck pain and neck stiffness.   Skin:  Negative for color change, pallor and rash.        Deeply jaundiced   Allergic/Immunologic: Negative for environmental allergies, food allergies and immunocompromised state.   Neurological:  Positive for weakness. Negative for dizziness, tremors, seizures, syncope, facial asymmetry, speech difficulty, light-headedness, numbness and headaches.   Hematological:  Negative for  adenopathy. Does not bruise/bleed easily.   Psychiatric/Behavioral:  Negative for agitation, behavioral problems, confusion, decreased concentration, dysphoric mood, hallucinations, self-injury, sleep disturbance and suicidal ideas. The patient is not nervous/anxious and is not hyperactive.      Objective:     Vital Signs (Most Recent):  Temp: 98.4 °F (36.9 °C) (03/04/25 0817)  Pulse: 98 (03/04/25 0900)  Resp: 19 (03/04/25 0817)  BP: (!) 104/49 (03/04/25 0817)  SpO2: 95 % (03/04/25 0817) Vital Signs (24h Range):  Temp:  [97.8 °F (36.6 °C)-98.5 °F (36.9 °C)] 98.4 °F (36.9 °C)  Pulse:  [] 98  Resp:  [16-20] 19  SpO2:  [95 %-100 %] 95 %  BP: ()/(42-72) 104/49     Weight: 67.6 kg (149 lb 0.5 oz)  Body mass index is 27.26 kg/m².  Body surface area is 1.72 meters squared.      Intake/Output Summary (Last 24 hours) at 3/4/2025 1052  Last data filed at 3/4/2025 0534  Gross per 24 hour   Intake 986.58 ml   Output --   Net 986.58 ml        Physical Exam  Vitals reviewed.   Constitutional:       General: She is not in acute distress.     Appearance: She is well-developed. She is ill-appearing. She is not diaphoretic.   HENT:      Head: Normocephalic and atraumatic.      Right Ear: External ear normal.      Left Ear: External ear normal.      Nose: Nose normal.      Right Sinus: No maxillary sinus tenderness or frontal sinus tenderness.      Left Sinus: No maxillary sinus tenderness or frontal sinus tenderness.      Mouth/Throat:      Pharynx: No oropharyngeal exudate.   Eyes:      General: Lids are normal. No scleral icterus.        Right eye: No discharge.         Left eye: No discharge.      Conjunctiva/sclera:      Right eye: Right conjunctiva is not injected. No hemorrhage.     Left eye: Left conjunctiva is not injected. No hemorrhage.     Pupils: Pupils are equal, round, and reactive to light.      Comments: Deeply icteric   Neck:      Thyroid: No thyromegaly.      Vascular: No JVD.      Trachea: No tracheal  deviation.   Cardiovascular:      Rate and Rhythm: Normal rate.   Pulmonary:      Effort: Pulmonary effort is normal. No respiratory distress.      Breath sounds: No stridor.   Chest:      Chest wall: No tenderness.   Abdominal:      General: Bowel sounds are normal. There is no distension.      Palpations: Abdomen is soft. There is no hepatomegaly, splenomegaly or mass.      Tenderness: There is no abdominal tenderness. There is no rebound.   Musculoskeletal:         General: No tenderness. Normal range of motion.      Cervical back: Normal range of motion and neck supple.   Lymphadenopathy:      Cervical: No cervical adenopathy.      Upper Body:      Right upper body: No supraclavicular adenopathy.      Left upper body: No supraclavicular adenopathy.   Skin:     General: Skin is dry.      Findings: No erythema or rash.   Neurological:      Mental Status: She is alert and oriented to person, place, and time.      Cranial Nerves: No cranial nerve deficit.      Coordination: Coordination normal.   Psychiatric:         Behavior: Behavior normal.         Thought Content: Thought content normal.         Judgment: Judgment normal.          Significant Labs:   BMP:   Recent Labs   Lab 03/03/25  1248 03/03/25  1844 03/04/25  0527   GLU 90 109 100    138 140   K 5.1 4.5 4.4    111* 117*   CO2 19* 17* 16*   BUN 48* 47* 39*   CREATININE 2.0* 1.6* 1.3   CALCIUM 7.9* 7.4* 6.7*   MG 2.9*  --  2.4   , CBC:   Recent Labs   Lab 03/03/25  1248 03/03/25  1844 03/04/25  0527   WBC 1.22* 0.74* 1.05*   HGB 13.8 13.2 11.7*   HCT 40.4 36.4* 33.0*    159 149*   , CMP:   Recent Labs   Lab 03/03/25  1248 03/03/25  1844 03/04/25  0527    138 140   K 5.1 4.5 4.4    111* 117*   CO2 19* 17* 16*   GLU 90 109 100   BUN 48* 47* 39*   CREATININE 2.0* 1.6* 1.3   CALCIUM 7.9* 7.4* 6.7*   PROT 5.6* 5.3* 4.7*   ALBUMIN 2.0* 2.0* 1.7*   BILITOT 24.1* 24.7* 22.7*   ALKPHOS 613* 575* 552*   * 590* 515*   * 143*  "122*   ANIONGAP 12 10 7*   , Coagulation:   Recent Labs   Lab 03/03/25  2109 03/04/25  0800   INR >10.0* 2.2*   , Haptoglobin: No results for input(s): "HAPTOGLOBIN" in the last 48 hours., Immunology: No results for input(s): "SPEP", "AURORA", "MARYANNE", "FREELAMBDALI" in the last 48 hours., LDH: No results for input(s): "LDHCSF", "BFSOURCE" in the last 48 hours., LFTs:   Recent Labs   Lab 03/03/25  1248 03/03/25  1844 03/04/25  0527   * 143* 122*   * 590* 515*   ALKPHOS 613* 575* 552*   BILITOT 24.1* 24.7* 22.7*   PROT 5.6* 5.3* 4.7*   ALBUMIN 2.0* 2.0* 1.7*   , Reticulocytes: No results for input(s): "RETIC" in the last 48 hours., Tumor Markers: No results for input(s): "PSA", "CEA", "", "AFPTM", "RV3682", "" in the last 48 hours.    Invalid input(s): "ALGTM", Uric Acid No results for input(s): "URICACID" in the last 48 hours., and Urine Studies:   Recent Labs   Lab 03/03/25  1937   COLORU Yellow   APPEARANCEUA Hazy*   PHUR >8.0*   SPECGRAV 1.015   PROTEINUA Trace*   GLUCUA Negative   KETONESU Negative   BILIRUBINUA 3+*   OCCULTUA 3+*   NITRITE Positive*   UROBILINOGEN Negative   LEUKOCYTESUR Negative   RBCUA 5*   WBCUA 7*   BACTERIA Rare   HYALINECASTS 2*       Diagnostic Results:  I have reviewed all pertinent imaging results/findings within the past 24 hours.  "

## 2025-03-04 NOTE — PLAN OF CARE
[facility] - Med [facility]  Discharge Final Note    Primary Care Provider: Obed Alford MD    Expected Discharge Date: 3/4/2025    Final Discharge Note (most recent)       Final Note - 03/04/25 1039          Final Note    Assessment Type Final Discharge Note     Anticipated Discharge Disposition [facility]        Post-Acute Status    Discharge Delays None known at this time                   Plan for pt to transfer to [facility] [place]. Pending bed availability at this time.

## 2025-03-04 NOTE — ASSESSMENT & PLAN NOTE
CINDI is likely due to pre-renal azotemia due to dehydration. Baseline creatinine is  1.0 . Most recent creatinine and eGFR are listed below.  Recent Labs     03/03/25  1248   CREATININE 2.0*   EGFRNORACEVR 30*      Plan  - CINDI is worsening. Will continue current treatment  - Avoid nephrotoxins and renally dose meds for GFR listed above  - Monitor urine output, serial BMP, and adjust therapy as needed

## 2025-03-04 NOTE — HPI
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  Pancytopenic nausea vomiting variant I was asked to see the patient for further evaluation by Hospital Medicine team ECOG status 3

## 2025-03-04 NOTE — ASSESSMENT & PLAN NOTE
Patient is likely to have ischemic hepatitis as evidenced by abnormalities in AST, ALT, T. BILI, ALBUMIN, and INR. Etiology includes  multifactorial visceral crisis from metastatic breast cancer to liver, drug induced liver injury, chemotherapy . Continue to monitor liver function while treating underlying condition. Daily labs to include CMP and PT/INR.    AST   Date Value Ref Range Status   03/04/2025 515 (H) 10 - 40 U/L Final     ALT   Date Value Ref Range Status   03/04/2025 122 (H) 10 - 44 U/L Final     Consulted transfer center for higher level of care for hepatology service  Discussed with hem/onc and higher level of service not warranted

## 2025-03-04 NOTE — ASSESSMENT & PLAN NOTE
Patient's blood pressure range in the last 24 hours was: BP  Min: 83/55  Max: 126/60.The patient's inpatient anti-hypertensive regimen is listed below:  Current Antihypertensives       Plan  - BP is uncontrolled, will adjust as follows: hold anti-hypertensive medications for now

## 2025-03-04 NOTE — ASSESSMENT & PLAN NOTE
LFTs elevated since prior to chemotherapy secondary to liver metastasis  MRI on 2/24 did not show obstructive process    --Daily CMP

## 2025-03-04 NOTE — ASSESSMENT & PLAN NOTE
LFTs elevated since prior to chemotherapy secondary to liver metastasis  MRI on 2/24 did not show obstructive process  Remains elevated in setting visceral crisis and hepatic involvement of metastatic breast cancer  Initiated transfer to higher level of care due to no hepatology service  Discussed with hem/onc and does not recommend

## 2025-03-04 NOTE — PLAN OF CARE
Discussed poc with pt, pt verbalized understanding    Purposeful rounding every 2hours    Blood pressure low on shift, 2 fluid boluses administered  Cardiac monitoring in use, pt is NSR  Blood glucose monitoring   Fall precautions in place, remains injury free  Pt denies c/o pain, nausea and vomiting     IVFs  Bed locked at lowest position  Call light within reach    Chart check complete  Will cont with POC

## 2025-03-04 NOTE — CONSULTS
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  Pancytopenic nausea vomiting variant I was asked to see the patient for further evaluation by Hospital Medicine team ECOG status 3    Oncology Treatment Plan:   OP BREAST DOCEtaxel trastuzumab pertuzumab Q3W    Medications:  Continuous Infusions:   0.9% NaCl   Intravenous Continuous 125 mL/hr at 03/04/25 0534 Rate Verify at 03/04/25 0534     Scheduled Meds:   mupirocin   Nasal BID    piperacillin-tazobactam (Zosyn) IV (PEDS and ADULTS) (extended infusion is not appropriate)  4.5 g Intravenous Q8H     PRN Meds:  Current Facility-Administered Medications:     aluminum-magnesium hydroxide-simethicone, 30 mL, Oral, QID PRN    dextrose 50%, 12.5 g, Intravenous, PRN    dextrose 50%, 25 g, Intravenous, PRN    glucagon (human recombinant), 1 mg, Intramuscular, PRN    glucose, 16 g, Oral, PRN    glucose, 24 g, Oral, PRN    magic mouthwash (diphenhydrAMINE 12.5 mg/5 mL 20 mL, aluminum & magnesium hydroxide-simethicone (MYLANTA) 20 mL, LIDOcaine HCl 2% (XYLOCAINE) 20 mL) solution, 15 mL, Swish & Spit, Q4H PRN    melatonin, 6 mg, Oral, Nightly PRN    morphine, 2 mg, Intravenous, Q4H PRN    naloxone, 0.02 mg, Intravenous, PRN    ondansetron, 8 mg, Intravenous, Q8H PRN    polyethylene glycol, 17 g, Oral, BID PRN    promethazine (PHENERGAN) 12.5 mg in 0.9% NaCl 50 mL IVPB, 12.5 mg, Intravenous, Q6H  PRN    sodium chloride 0.9%, 10 mL, Intravenous, Q12H PRN    traMADoL, 50 mg, Oral, Q6H PRN     Review of patient's allergies indicates:   Allergen Reactions    Codeine Rash        Past Medical History:   Diagnosis Date    Depression     controlled with meds    Fibroid December 2, 2022    Hypertension     It's controlled with meds.     Past Surgical History:   Procedure Laterality Date    FUNCTIONAL ENDOSCOPIC SINUS SURGERY (FESS) USING COMPUTER-ASSISTED NAVIGATION  02/2022     Family History       Problem Relation (Age of Onset)    Breast cancer Mother (63), Other (60 - 69)    HPV Daughter (23)    Hypertension Mother    Lung cancer Maternal Grandmother    Prostate cancer Father (70)    Skin cancer Paternal Grandfather    Thyroid disease Maternal Aunt          Tobacco Use    Smoking status: Never    Smokeless tobacco: Never   Substance and Sexual Activity    Alcohol use: No    Drug use: No    Sexual activity: Yes     Partners: Male     Birth control/protection: OCP       Review of Systems   Constitutional:  Positive for activity change, appetite change and fatigue. Negative for chills, diaphoresis, fever and unexpected weight change.   HENT:  Negative for congestion, dental problem, drooling, ear discharge, ear pain, facial swelling, hearing loss, mouth sores, nosebleeds, postnasal drip, rhinorrhea, sinus pressure, sneezing, sore throat, tinnitus, trouble swallowing and voice change.    Eyes:  Negative for photophobia, pain, discharge, redness, itching and visual disturbance.   Respiratory:  Negative for cough, choking, chest tightness, shortness of breath, wheezing and stridor.    Cardiovascular:  Negative for chest pain, palpitations and leg swelling.   Gastrointestinal:  Positive for nausea. Negative for abdominal distention, abdominal pain, anal bleeding, blood in stool, constipation, diarrhea, rectal pain and vomiting.   Endocrine: Negative for cold intolerance, heat intolerance, polydipsia, polyphagia and  polyuria.   Genitourinary:  Negative for decreased urine volume, difficulty urinating, dyspareunia, dysuria, enuresis, flank pain, frequency, genital sores, hematuria, menstrual problem, pelvic pain, urgency, vaginal bleeding, vaginal discharge and vaginal pain.   Musculoskeletal:  Negative for arthralgias, back pain, gait problem, joint swelling, myalgias, neck pain and neck stiffness.   Skin:  Negative for color change, pallor and rash.        Deeply jaundiced   Allergic/Immunologic: Negative for environmental allergies, food allergies and immunocompromised state.   Neurological:  Positive for weakness. Negative for dizziness, tremors, seizures, syncope, facial asymmetry, speech difficulty, light-headedness, numbness and headaches.   Hematological:  Negative for adenopathy. Does not bruise/bleed easily.   Psychiatric/Behavioral:  Negative for agitation, behavioral problems, confusion, decreased concentration, dysphoric mood, hallucinations, self-injury, sleep disturbance and suicidal ideas. The patient is not nervous/anxious and is not hyperactive.      Objective:     Vital Signs (Most Recent):  Temp: 98.4 °F (36.9 °C) (03/04/25 0817)  Pulse: 98 (03/04/25 0900)  Resp: 19 (03/04/25 0817)  BP: (!) 104/49 (03/04/25 0817)  SpO2: 95 % (03/04/25 0817) Vital Signs (24h Range):  Temp:  [97.8 °F (36.6 °C)-98.5 °F (36.9 °C)] 98.4 °F (36.9 °C)  Pulse:  [] 98  Resp:  [16-20] 19  SpO2:  [95 %-100 %] 95 %  BP: ()/(42-72) 104/49     Weight: 67.6 kg (149 lb 0.5 oz)  Body mass index is 27.26 kg/m².  Body surface area is 1.72 meters squared.      Intake/Output Summary (Last 24 hours) at 3/4/2025 1052  Last data filed at 3/4/2025 0534  Gross per 24 hour   Intake 986.58 ml   Output --   Net 986.58 ml        Physical Exam  Vitals reviewed.   Constitutional:       General: She is not in acute distress.     Appearance: She is well-developed. She is ill-appearing. She is not diaphoretic.   HENT:      Head: Normocephalic and  atraumatic.      Right Ear: External ear normal.      Left Ear: External ear normal.      Nose: Nose normal.      Right Sinus: No maxillary sinus tenderness or frontal sinus tenderness.      Left Sinus: No maxillary sinus tenderness or frontal sinus tenderness.      Mouth/Throat:      Pharynx: No oropharyngeal exudate.   Eyes:      General: Lids are normal. No scleral icterus.        Right eye: No discharge.         Left eye: No discharge.      Conjunctiva/sclera:      Right eye: Right conjunctiva is not injected. No hemorrhage.     Left eye: Left conjunctiva is not injected. No hemorrhage.     Pupils: Pupils are equal, round, and reactive to light.      Comments: Deeply icteric   Neck:      Thyroid: No thyromegaly.      Vascular: No JVD.      Trachea: No tracheal deviation.   Cardiovascular:      Rate and Rhythm: Normal rate.   Pulmonary:      Effort: Pulmonary effort is normal. No respiratory distress.      Breath sounds: No stridor.   Chest:      Chest wall: No tenderness.   Abdominal:      General: Bowel sounds are normal. There is no distension.      Palpations: Abdomen is soft. There is no hepatomegaly, splenomegaly or mass.      Tenderness: There is no abdominal tenderness. There is no rebound.   Musculoskeletal:         General: No tenderness. Normal range of motion.      Cervical back: Normal range of motion and neck supple.   Lymphadenopathy:      Cervical: No cervical adenopathy.      Upper Body:      Right upper body: No supraclavicular adenopathy.      Left upper body: No supraclavicular adenopathy.   Skin:     General: Skin is dry.      Findings: No erythema or rash.   Neurological:      Mental Status: She is alert and oriented to person, place, and time.      Cranial Nerves: No cranial nerve deficit.      Coordination: Coordination normal.   Psychiatric:         Behavior: Behavior normal.         Thought Content: Thought content normal.         Judgment: Judgment normal.          Significant Labs:  "  BMP:   Recent Labs   Lab 03/03/25 1248 03/03/25 1844 03/04/25  0527   GLU 90 109 100    138 140   K 5.1 4.5 4.4    111* 117*   CO2 19* 17* 16*   BUN 48* 47* 39*   CREATININE 2.0* 1.6* 1.3   CALCIUM 7.9* 7.4* 6.7*   MG 2.9*  --  2.4   , CBC:   Recent Labs   Lab 03/03/25 1248 03/03/25 1844 03/04/25  0527   WBC 1.22* 0.74* 1.05*   HGB 13.8 13.2 11.7*   HCT 40.4 36.4* 33.0*    159 149*   , CMP:   Recent Labs   Lab 03/03/25 1248 03/03/25 1844 03/04/25  0527    138 140   K 5.1 4.5 4.4    111* 117*   CO2 19* 17* 16*   GLU 90 109 100   BUN 48* 47* 39*   CREATININE 2.0* 1.6* 1.3   CALCIUM 7.9* 7.4* 6.7*   PROT 5.6* 5.3* 4.7*   ALBUMIN 2.0* 2.0* 1.7*   BILITOT 24.1* 24.7* 22.7*   ALKPHOS 613* 575* 552*   * 590* 515*   * 143* 122*   ANIONGAP 12 10 7*   , Coagulation:   Recent Labs   Lab 03/03/25 2109 03/04/25  0800   INR >10.0* 2.2*   , Haptoglobin: No results for input(s): "HAPTOGLOBIN" in the last 48 hours., Immunology: No results for input(s): "SPEP", "AURORA", "MARYANNE", "FREELAMBDALI" in the last 48 hours., LDH: No results for input(s): "LDHCSF", "BFSOURCE" in the last 48 hours., LFTs:   Recent Labs   Lab 03/03/25 1248 03/03/25 1844 03/04/25  0527   * 143* 122*   * 590* 515*   ALKPHOS 613* 575* 552*   BILITOT 24.1* 24.7* 22.7*   PROT 5.6* 5.3* 4.7*   ALBUMIN 2.0* 2.0* 1.7*   , Reticulocytes: No results for input(s): "RETIC" in the last 48 hours., Tumor Markers: No results for input(s): "PSA", "CEA", "", "AFPTM", "[id number]", "" in the last 48 hours.    Invalid input(s): "ALGTM", Uric Acid No results for input(s): "URICACID" in the last 48 hours., and Urine Studies:   Recent Labs   Lab 03/03/25 1937   COLORU Yellow   APPEARANCEUA Hazy*   PHUR >8.0*   SPECGRAV 1.015   PROTEINUA Trace*   GLUCUA Negative   KETONESU Negative   BILIRUBINUA 3+*   OCCULTUA 3+*   NITRITE Positive*   UROBILINOGEN Negative   LEUKOCYTESUR Negative   RBCUA 5*   WBCUA 7*   BACTERIA " Rare   HYALINECASTS 2*       Diagnostic Results:  I have reviewed all pertinent imaging results/findings within the past 24 hours.  Assessment/Plan:     Neutropenia  Secondary to chemotherapy explain the situation in the carlos demonstrating that drug is active in her system.  Hopefully Sha Cancer cells as well as normal bone marrow    Intractable nausea and vomiting  Lly will have some responsePatient appears to be better this morning is eating some Jell-O and states that she feels better very extensive involvement with disease process    Malignant neoplasm of overlapping sites of left breast in female, estrogen receptor negative  Patient with visceral crisis HER2 positive breast cancer treated with carboplatin and Taxotere.  As an inpatient patient is currently pancytopenic at the present time secondary to drug very extensive conversation with the patient as well as her  at bedside clearly understands she has stage IV disease noncurable in nature.  At the request of Hospital Medicine I have spoken to them concerning this awaiting recovery of counts in outpatient setting would consider treatment with Herceptin and Perjeta at that particular point discussed implications and answered questions with them understanding of the gravity of this situation I offered this show them the extent of her disease on PET scan which she declined at the present time    Transaminitis  Secondary to liver involvement extensive visceral crisis    Moderate episode of recurrent major depressive disorder  Extensive involvement of liver by malignancy.  Hopefully chemotherapy will have some response over the next 7-10 days        Thank you for your consult. I will follow-up with patient. Please contact us if you have any additional questions.    Billy Olivo MD  Hematology/Oncology  [facility]

## 2025-03-04 NOTE — ASSESSMENT & PLAN NOTE
Secondary to chemotherapy, patient and family report inadequate management of nausea and vomiting at home    --anti-emetics PRN per MAR  --IV fluids  --clear liquids as tolerated

## 2025-03-04 NOTE — ASSESSMENT & PLAN NOTE
Extensive involvement of liver by malignancy.  Hopefully chemotherapy will have some response over the next 7-10 days

## 2025-03-04 NOTE — ASSESSMENT & PLAN NOTE
Secondary to chemotherapy, patient and family report inadequate management of nausea and vomiting at home    --anti-emetics PRN per MAR  --IV fluids  Advance diet as tolerated

## 2025-03-04 NOTE — ASSESSMENT & PLAN NOTE
Followed by Oncology, known metastasis to liver, treated with Docetaxel on 2/24, planned for Anti-Her2 therapy as OP.     --Consult oncology if clinically indicated

## 2025-03-05 ENCOUNTER — TUMOR BOARD CONFERENCE (OUTPATIENT)
Dept: HEMATOLOGY/ONCOLOGY | Facility: CLINIC | Age: 50
End: 2025-03-05
Payer: COMMERCIAL

## 2025-03-05 NOTE — SUBJECTIVE & OBJECTIVE
Interval History: See hospital course for today      Review of Systems   Constitutional:  Positive for activity change, appetite change (improving) and fatigue (improving). Negative for fever.   HENT:  Positive for mouth sores.    Respiratory:  Negative for shortness of breath.    Gastrointestinal:  Positive for nausea and vomiting (improving).   Skin:  Positive for color change.   Allergic/Immunologic: Positive for immunocompromised state.   Neurological:  Positive for weakness (improving).   Psychiatric/Behavioral:  Negative for agitation, behavioral problems, confusion, decreased concentration and dysphoric mood. The patient is not nervous/anxious.      Objective:     Vital Signs (Most Recent):  Temp: 98.6 °F (37 °C) (03/05/25 1142)  Pulse: 98 (03/05/25 1142)  Resp: 19 (03/05/25 1142)  BP: 115/68 (03/05/25 1142)  SpO2: 95 % (03/05/25 1142) Vital Signs (24h Range):  Temp:  [98.1 °F (36.7 °C)-99.1 °F (37.3 °C)] 98.6 °F (37 °C)  Pulse:  [] 98  Resp:  [13-19] 19  SpO2:  [92 %-97 %] 95 %  BP: ()/(48-68) 115/68     Weight: 67.6 kg (149 lb 0.5 oz)  Body mass index is 27.26 kg/m².    Intake/Output Summary (Last 24 hours) at 3/5/2025 1158  Last data filed at 3/4/2025 1921  Gross per 24 hour   Intake 1867.06 ml   Output --   Net 1867.06 ml         Physical Exam  Vitals and nursing note reviewed. Exam conducted with a chaperone present (family).   Constitutional:       General: She is not in acute distress.     Appearance: She is ill-appearing. She is not toxic-appearing.   HENT:      Head: Normocephalic and atraumatic.   Eyes:      General: Scleral icterus present.   Cardiovascular:      Rate and Rhythm: Normal rate.   Pulmonary:      Effort: Pulmonary effort is normal. No respiratory distress.   Abdominal:      General: There is distension.      Palpations: Abdomen is soft.      Tenderness: There is no abdominal tenderness.   Musculoskeletal:      Right lower leg: No edema.      Left lower leg: No edema.    Skin:     General: Skin is warm.      Coloration: Skin is jaundiced.   Neurological:      Mental Status: She is alert and oriented to person, place, and time.      Motor: Weakness present.   Psychiatric:         Mood and Affect: Mood normal.         Behavior: Behavior normal.               Significant Labs: All pertinent labs within the past 24 hours have been reviewed.  CBC:   Recent Labs   Lab 03/03/25  1844 03/04/25  0527 03/05/25  0538   WBC 0.74* 1.05* 1.60*   HGB 13.2 11.7* 11.8*   HCT 36.4* 33.0* 33.3*    149* 148*     CMP:   Recent Labs   Lab 03/03/25  1844 03/04/25  0527 03/05/25  0538    140 142   K 4.5 4.4 3.8   * 117* 118*   CO2 17* 16* 15*    100 84   BUN 47* 39* 31*   CREATININE 1.6* 1.3 1.3   CALCIUM 7.4* 6.7* 7.2*   PROT 5.3* 4.7* 4.8*   ALBUMIN 2.0* 1.7* 1.7*   BILITOT 24.7* 22.7* 25.3*   ALKPHOS 575* 552* 603*   * 515* 481*   * 122* 120*   ANIONGAP 10 7* 9       Significant Imaging: I have reviewed all pertinent imaging results/findings within the past 24 hours.  CXR: I have reviewed all pertinent results/findings within the past 24 hours and my personal findings are:  for picc line placement

## 2025-03-05 NOTE — SUBJECTIVE & OBJECTIVE
Interval History:  Patient is resting comfortably is appears to be more comfortable today than yesterday although complaining about grade 1 mucositis    Oncology Treatment Plan:   OP BREAST DOCEtaxel trastuzumab pertuzumab Q3W    Medications:  Continuous Infusions:   0.9% NaCl   Intravenous Continuous 125 mL/hr at 03/05/25 0000 New Bag at 03/05/25 0000     Scheduled Meds:   mupirocin   Nasal BID    piperacillin-tazobactam (Zosyn) IV (PEDS and ADULTS) (extended infusion is not appropriate)  4.5 g Intravenous Q8H     PRN Meds:  Current Facility-Administered Medications:     aluminum-magnesium hydroxide-simethicone, 30 mL, Oral, QID PRN    dextrose 50%, 12.5 g, Intravenous, PRN    dextrose 50%, 25 g, Intravenous, PRN    glucagon (human recombinant), 1 mg, Intramuscular, PRN    glucose, 16 g, Oral, PRN    glucose, 24 g, Oral, PRN    magic mouthwash (diphenhydrAMINE 12.5 mg/5 mL 20 mL, aluminum & magnesium hydroxide-simethicone (MYLANTA) 20 mL, LIDOcaine HCl 2% (XYLOCAINE) 20 mL) solution, 15 mL, Swish & Spit, Q4H PRN    morphine, 2 mg, Intravenous, Q4H PRN    naloxone, 0.02 mg, Intravenous, PRN    ondansetron, 8 mg, Intravenous, Q8H PRN    polyethylene glycol, 17 g, Oral, BID PRN    promethazine (PHENERGAN) 12.5 mg in 0.9% NaCl 50 mL IVPB, 12.5 mg, Intravenous, Q6H PRN    sodium chloride 0.9%, 10 mL, Intravenous, Q12H PRN    traMADoL, 50 mg, Oral, Q6H PRN    zolpidem, 5 mg, Oral, Daily PRN     Review of Systems   Constitutional:  Negative for activity change, appetite change, chills, diaphoresis, fatigue, fever and unexpected weight change.   HENT:  Positive for mouth sores. Negative for congestion, dental problem, drooling, ear discharge, ear pain, facial swelling, hearing loss, nosebleeds, postnasal drip, rhinorrhea, sinus pressure, sneezing, sore throat, tinnitus, trouble swallowing and voice change.    Eyes:  Negative for photophobia, pain, discharge, redness, itching and visual disturbance.   Respiratory:  Negative  for cough, choking, chest tightness, shortness of breath, wheezing and stridor.    Cardiovascular:  Negative for chest pain, palpitations and leg swelling.   Gastrointestinal:  Negative for abdominal distention, abdominal pain, anal bleeding, blood in stool, constipation, diarrhea, nausea, rectal pain and vomiting.   Endocrine: Negative for cold intolerance, heat intolerance, polydipsia, polyphagia and polyuria.   Genitourinary:  Negative for decreased urine volume, difficulty urinating, dyspareunia, dysuria, enuresis, flank pain, frequency, genital sores, hematuria, menstrual problem, pelvic pain, urgency, vaginal bleeding, vaginal discharge and vaginal pain.   Musculoskeletal:  Negative for arthralgias, back pain, gait problem, joint swelling, myalgias, neck pain and neck stiffness.   Skin:  Negative for color change, pallor and rash.   Allergic/Immunologic: Negative for environmental allergies, food allergies and immunocompromised state.   Neurological:  Negative for dizziness, tremors, seizures, syncope, facial asymmetry, speech difficulty, weakness, light-headedness, numbness and headaches.   Hematological:  Negative for adenopathy. Does not bruise/bleed easily.   Psychiatric/Behavioral:  Positive for dysphoric mood. Negative for agitation, behavioral problems, confusion, decreased concentration, hallucinations, self-injury, sleep disturbance and suicidal ideas. The patient is nervous/anxious. The patient is not hyperactive.      Objective:     Vital Signs (Most Recent):  Temp: 98.6 °F (37 °C) (03/05/25 0412)  Pulse: 100 (03/05/25 0412)  Resp: 13 (03/05/25 0412)  BP: (!) 105/53 (03/05/25 0412)  SpO2: 96 % (03/05/25 0412) Vital Signs (24h Range):  Temp:  [98.1 °F (36.7 °C)-98.6 °F (37 °C)] 98.6 °F (37 °C)  Pulse:  [] 100  Resp:  [13-19] 13  SpO2:  [95 %-97 %] 96 %  BP: ()/(48-59) 105/53     Weight: 67.6 kg (149 lb 0.5 oz)  Body mass index is 27.26 kg/m².  Body surface area is 1.72 meters  squared.      Intake/Output Summary (Last 24 hours) at 3/5/2025 0636  Last data filed at 3/4/2025 1921  Gross per 24 hour   Intake 1867.06 ml   Output --   Net 1867.06 ml        Physical Exam  Vitals reviewed.   Constitutional:       General: She is not in acute distress.     Appearance: She is well-developed. She is not diaphoretic.   HENT:      Head: Normocephalic and atraumatic.      Right Ear: External ear normal.      Left Ear: External ear normal.      Nose: Nose normal.      Right Sinus: No maxillary sinus tenderness or frontal sinus tenderness.      Left Sinus: No maxillary sinus tenderness or frontal sinus tenderness.      Mouth/Throat:      Pharynx: No oropharyngeal exudate.   Eyes:      General: Lids are normal. No scleral icterus.        Right eye: No discharge.         Left eye: No discharge.      Conjunctiva/sclera: Conjunctivae normal.      Right eye: Right conjunctiva is not injected. No hemorrhage.     Left eye: Left conjunctiva is not injected. No hemorrhage.     Pupils: Pupils are equal, round, and reactive to light.   Neck:      Thyroid: No thyromegaly.      Vascular: No JVD.      Trachea: No tracheal deviation.   Cardiovascular:      Rate and Rhythm: Normal rate.   Pulmonary:      Effort: Pulmonary effort is normal. No respiratory distress.      Breath sounds: No stridor.   Chest:      Chest wall: No tenderness.   Abdominal:      General: Bowel sounds are normal. There is no distension.      Palpations: Abdomen is soft. There is no hepatomegaly, splenomegaly or mass.      Tenderness: There is no abdominal tenderness. There is no rebound.   Musculoskeletal:         General: No tenderness. Normal range of motion.      Cervical back: Normal range of motion and neck supple.   Lymphadenopathy:      Cervical: No cervical adenopathy.      Upper Body:      Right upper body: No supraclavicular adenopathy.      Left upper body: No supraclavicular adenopathy.   Skin:     General: Skin is dry.       "Findings: No erythema or rash.   Neurological:      Mental Status: She is alert and oriented to person, place, and time.      Cranial Nerves: No cranial nerve deficit.      Motor: Weakness present.      Coordination: Coordination abnormal.      Gait: Gait abnormal.   Psychiatric:         Behavior: Behavior normal.         Thought Content: Thought content normal.         Judgment: Judgment normal.          Significant Labs:   BMP:   Recent Labs   Lab 03/03/25 1248 03/03/25 1844 03/04/25  0527   GLU 90 109 100    138 140   K 5.1 4.5 4.4    111* 117*   CO2 19* 17* 16*   BUN 48* 47* 39*   CREATININE 2.0* 1.6* 1.3   CALCIUM 7.9* 7.4* 6.7*   MG 2.9*  --  2.4   , CBC:   Recent Labs   Lab 03/03/25 1844 03/04/25  0527 03/05/25  0538   WBC 0.74* 1.05* 1.60*   HGB 13.2 11.7* 11.8*   HCT 36.4* 33.0* 33.3*    149* 148*   , CMP:   Recent Labs   Lab 03/03/25 1248 03/03/25 1844 03/04/25  0527    138 140   K 5.1 4.5 4.4    111* 117*   CO2 19* 17* 16*   GLU 90 109 100   BUN 48* 47* 39*   CREATININE 2.0* 1.6* 1.3   CALCIUM 7.9* 7.4* 6.7*   PROT 5.6* 5.3* 4.7*   ALBUMIN 2.0* 2.0* 1.7*   BILITOT 24.1* 24.7* 22.7*   ALKPHOS 613* 575* 552*   * 590* 515*   * 143* 122*   ANIONGAP 12 10 7*   , Coagulation:   Recent Labs   Lab 03/03/25 2109 03/04/25  0800   INR >10.0* 2.2*   , Haptoglobin: No results for input(s): "HAPTOGLOBIN" in the last 48 hours., Immunology: No results for input(s): "SPEP", "AURORA", "MARYANNE", "FREELAMBDALI" in the last 48 hours., LDH: No results for input(s): "LDHCSF", "BFSOURCE" in the last 48 hours., LFTs:   Recent Labs   Lab 03/03/25  1248 03/03/25  1844 03/04/25  0527   * 143* 122*   * 590* 515*   ALKPHOS 613* 575* 552*   BILITOT 24.1* 24.7* 22.7*   PROT 5.6* 5.3* 4.7*   ALBUMIN 2.0* 2.0* 1.7*   , Reticulocytes: No results for input(s): "RETIC" in the last 48 hours., Tumor Markers: No results for input(s): "PSA", "CEA", "", "AFPTM", "[id number]", "" in " "the last 48 hours.    Invalid input(s): "ALGTM", Uric Acid No results for input(s): "URICACID" in the last 48 hours., and Urine Studies:   Recent Labs   Lab 03/03/25 1937   COLORU Yellow   APPEARANCEUA Hazy*   PHUR >8.0*   SPECGRAV 1.015   PROTEINUA Trace*   GLUCUA Negative   KETONESU Negative   BILIRUBINUA 3+*   OCCULTUA 3+*   NITRITE Positive*   UROBILINOGEN Negative   LEUKOCYTESUR Negative   RBCUA 5*   WBCUA 7*   BACTERIA Rare   HYALINECASTS 2*       Diagnostic Results:  I have reviewed all pertinent imaging results/findings within the past 24 hours.  "

## 2025-03-05 NOTE — ASSESSMENT & PLAN NOTE
CINDI is likely due to pre-renal azotemia due to dehydration. Baseline creatinine is 1.0. Most recent creatinine and eGFR are listed below.  Recent Labs     03/03/25  1844 03/04/25  0527 03/05/25  0538   CREATININE 1.6* 1.3 1.3   EGFRNORACEVR 39* 50* 50*      Plan  - CINDI is improving  - Avoid nephrotoxins and renally dose meds for GFR listed above  - Monitor urine output, serial BMP, and adjust therapy as needed

## 2025-03-05 NOTE — ASSESSMENT & PLAN NOTE
Secondary to chemotherapy, patient and family report inadequate management of nausea and vomiting at home      --IV fluids, decreasing rate  Schedule antiemetics   Advance diet as tolerated

## 2025-03-05 NOTE — PROGRESS NOTES
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  Pancytopenic nausea vomiting variant I was asked to see the patient for further evaluation by Hospital Medicine team ECOG status 3    Interval History:  Patient is resting comfortably is appears to be more comfortable today than yesterday although complaining about grade 1 mucositis    Oncology Treatment Plan:   OP BREAST DOCEtaxel trastuzumab pertuzumab Q3W    Medications:  Continuous Infusions:   0.9% NaCl   Intravenous Continuous 125 mL/hr at 03/05/25 0000 New Bag at 03/05/25 0000     Scheduled Meds:   mupirocin   Nasal BID    piperacillin-tazobactam (Zosyn) IV (PEDS and ADULTS) (extended infusion is not appropriate)  4.5 g Intravenous Q8H     PRN Meds:  Current Facility-Administered Medications:     aluminum-magnesium hydroxide-simethicone, 30 mL, Oral, QID PRN    dextrose 50%, 12.5 g, Intravenous, PRN    dextrose 50%, 25 g, Intravenous, PRN    glucagon (human recombinant), 1 mg, Intramuscular, PRN    glucose, 16 g, Oral, PRN    glucose, 24 g, Oral, PRN    magic mouthwash (diphenhydrAMINE 12.5 mg/5 mL 20 mL, aluminum & magnesium hydroxide-simethicone (MYLANTA) 20 mL, LIDOcaine HCl 2% (XYLOCAINE) 20 mL) solution, 15 mL, Swish & Spit, Q4H PRN    morphine, 2 mg, Intravenous, Q4H PRN    naloxone, 0.02 mg, Intravenous, PRN    ondansetron, 8 mg, Intravenous, Q8H PRN    polyethylene glycol, 17 g, Oral, BID PRN    promethazine (PHENERGAN) 12.5 mg in 0.9% NaCl 50 mL IVPB, 12.5 mg, Intravenous, Q6H PRN    sodium chloride 0.9%, 10 mL, Intravenous, Q12H PRN    traMADoL, 50 mg, Oral, Q6H PRN     zolpidem, 5 mg, Oral, Daily PRN     Review of Systems   Constitutional:  Negative for activity change, appetite change, chills, diaphoresis, fatigue, fever and unexpected weight change.   HENT:  Positive for mouth sores. Negative for congestion, dental problem, drooling, ear discharge, ear pain, facial swelling, hearing loss, nosebleeds, postnasal drip, rhinorrhea, sinus pressure, sneezing, sore throat, tinnitus, trouble swallowing and voice change.    Eyes:  Negative for photophobia, pain, discharge, redness, itching and visual disturbance.   Respiratory:  Negative for cough, choking, chest tightness, shortness of breath, wheezing and stridor.    Cardiovascular:  Negative for chest pain, palpitations and leg swelling.   Gastrointestinal:  Negative for abdominal distention, abdominal pain, anal bleeding, blood in stool, constipation, diarrhea, nausea, rectal pain and vomiting.   Endocrine: Negative for cold intolerance, heat intolerance, polydipsia, polyphagia and polyuria.   Genitourinary:  Negative for decreased urine volume, difficulty urinating, dyspareunia, dysuria, enuresis, flank pain, frequency, genital sores, hematuria, menstrual problem, pelvic pain, urgency, vaginal bleeding, vaginal discharge and vaginal pain.   Musculoskeletal:  Negative for arthralgias, back pain, gait problem, joint swelling, myalgias, neck pain and neck stiffness.   Skin:  Negative for color change, pallor and rash.   Allergic/Immunologic: Negative for environmental allergies, food allergies and immunocompromised state.   Neurological:  Negative for dizziness, tremors, seizures, syncope, facial asymmetry, speech difficulty, weakness, light-headedness, numbness and headaches.   Hematological:  Negative for adenopathy. Does not bruise/bleed easily.   Psychiatric/Behavioral:  Positive for dysphoric mood. Negative for agitation, behavioral problems, confusion, decreased concentration, hallucinations, self-injury, sleep disturbance and  suicidal ideas. The patient is nervous/anxious. The patient is not hyperactive.      Objective:     Vital Signs (Most Recent):  Temp: 98.6 °F (37 °C) (03/05/25 0412)  Pulse: 100 (03/05/25 0412)  Resp: 13 (03/05/25 0412)  BP: (!) 105/53 (03/05/25 0412)  SpO2: 96 % (03/05/25 0412) Vital Signs (24h Range):  Temp:  [98.1 °F (36.7 °C)-98.6 °F (37 °C)] 98.6 °F (37 °C)  Pulse:  [] 100  Resp:  [13-19] 13  SpO2:  [95 %-97 %] 96 %  BP: ()/(48-59) 105/53     Weight: 67.6 kg (149 lb 0.5 oz)  Body mass index is 27.26 kg/m².  Body surface area is 1.72 meters squared.      Intake/Output Summary (Last 24 hours) at 3/5/2025 0636  Last data filed at 3/4/2025 1921  Gross per 24 hour   Intake 1867.06 ml   Output --   Net 1867.06 ml        Physical Exam  Vitals reviewed.   Constitutional:       General: She is not in acute distress.     Appearance: She is well-developed. She is not diaphoretic.   HENT:      Head: Normocephalic and atraumatic.      Right Ear: External ear normal.      Left Ear: External ear normal.      Nose: Nose normal.      Right Sinus: No maxillary sinus tenderness or frontal sinus tenderness.      Left Sinus: No maxillary sinus tenderness or frontal sinus tenderness.      Mouth/Throat:      Pharynx: No oropharyngeal exudate.   Eyes:      General: Lids are normal. No scleral icterus.        Right eye: No discharge.         Left eye: No discharge.      Conjunctiva/sclera: Conjunctivae normal.      Right eye: Right conjunctiva is not injected. No hemorrhage.     Left eye: Left conjunctiva is not injected. No hemorrhage.     Pupils: Pupils are equal, round, and reactive to light.   Neck:      Thyroid: No thyromegaly.      Vascular: No JVD.      Trachea: No tracheal deviation.   Cardiovascular:      Rate and Rhythm: Normal rate.   Pulmonary:      Effort: Pulmonary effort is normal. No respiratory distress.      Breath sounds: No stridor.   Chest:      Chest wall: No tenderness.   Abdominal:      General:  "Bowel sounds are normal. There is no distension.      Palpations: Abdomen is soft. There is no hepatomegaly, splenomegaly or mass.      Tenderness: There is no abdominal tenderness. There is no rebound.   Musculoskeletal:         General: No tenderness. Normal range of motion.      Cervical back: Normal range of motion and neck supple.   Lymphadenopathy:      Cervical: No cervical adenopathy.      Upper Body:      Right upper body: No supraclavicular adenopathy.      Left upper body: No supraclavicular adenopathy.   Skin:     General: Skin is dry.      Findings: No erythema or rash.   Neurological:      Mental Status: She is alert and oriented to person, place, and time.      Cranial Nerves: No cranial nerve deficit.      Motor: Weakness present.      Coordination: Coordination abnormal.      Gait: Gait abnormal.   Psychiatric:         Behavior: Behavior normal.         Thought Content: Thought content normal.         Judgment: Judgment normal.          Significant Labs:   BMP:   Recent Labs   Lab 03/03/25 1248 03/03/25 1844 03/04/25  0527   GLU 90 109 100    138 140   K 5.1 4.5 4.4    111* 117*   CO2 19* 17* 16*   BUN 48* 47* 39*   CREATININE 2.0* 1.6* 1.3   CALCIUM 7.9* 7.4* 6.7*   MG 2.9*  --  2.4   , CBC:   Recent Labs   Lab 03/03/25 1844 03/04/25  0527 03/05/25  0538   WBC 0.74* 1.05* 1.60*   HGB 13.2 11.7* 11.8*   HCT 36.4* 33.0* 33.3*    149* 148*   , CMP:   Recent Labs   Lab 03/03/25 1248 03/03/25  1844 03/04/25  0527    138 140   K 5.1 4.5 4.4    111* 117*   CO2 19* 17* 16*   GLU 90 109 100   BUN 48* 47* 39*   CREATININE 2.0* 1.6* 1.3   CALCIUM 7.9* 7.4* 6.7*   PROT 5.6* 5.3* 4.7*   ALBUMIN 2.0* 2.0* 1.7*   BILITOT 24.1* 24.7* 22.7*   ALKPHOS 613* 575* 552*   * 590* 515*   * 143* 122*   ANIONGAP 12 10 7*   , Coagulation:   Recent Labs   Lab 03/03/25  2109 03/04/25  0800   INR >10.0* 2.2*   , Haptoglobin: No results for input(s): "HAPTOGLOBIN" in the last 48 " "hours., Immunology: No results for input(s): "SPEP", "AURORA", "MARYANNE", "FREELAMBDALI" in the last 48 hours., LDH: No results for input(s): "LDHCSF", "BFSOURCE" in the last 48 hours., LFTs:   Recent Labs   Lab 03/03/25  1248 03/03/25  1844 03/04/25  0527   * 143* 122*   * 590* 515*   ALKPHOS 613* 575* 552*   BILITOT 24.1* 24.7* 22.7*   PROT 5.6* 5.3* 4.7*   ALBUMIN 2.0* 2.0* 1.7*   , Reticulocytes: No results for input(s): "RETIC" in the last 48 hours., Tumor Markers: No results for input(s): "PSA", "CEA", "", "AFPTM", "AI6476", "" in the last 48 hours.    Invalid input(s): "ALGTM", Uric Acid No results for input(s): "URICACID" in the last 48 hours., and Urine Studies:   Recent Labs   Lab 03/03/25  1937   COLORU Yellow   APPEARANCEUA Hazy*   PHUR >8.0*   SPECGRAV 1.015   PROTEINUA Trace*   GLUCUA Negative   KETONESU Negative   BILIRUBINUA 3+*   OCCULTUA 3+*   NITRITE Positive*   UROBILINOGEN Negative   LEUKOCYTESUR Negative   RBCUA 5*   WBCUA 7*   BACTERIA Rare   HYALINECASTS 2*       Diagnostic Results:  I have reviewed all pertinent imaging results/findings within the past 24 hours.  Assessment/Plan:     * Malignant neoplasm of overlapping sites of left breast in female, estrogen receptor negative  Patient with visceral crisis HER2 positive breast cancer treated with carboplatin and Taxotere.  As an inpatient patient is currently pancytopenic at the present time secondary to drug very extensive conversation with the patient as well as her  at bedside clearly understands she has stage IV disease noncurable in nature.  At the request of Hospital Medicine I have spoken to them concerning this awaiting recovery of counts in outpatient setting would consider treatment with Herceptin and Perjeta at that particular point discussed implications and answered questions with them understanding of the gravity of this situation I offered this show them the extent of her disease on PET scan which she " declined at the present time  03/05/2025.  Extensive conversation with her  as well as she.  Discussed the nature systemic chemotherapy with mucositis pancytopenia as side-effects.  There was concerns of transferred to [facility] for hepatology consultation I do not feel this is warranted at this point the patient has metastatic disease in her liver as well as in other locations variant we have given her first-line chemotherapy and awaiting for response outpatient administration of HER2 acting agents once patient is medically stable variant she clearly understands that she has a treatable but not curable malignancy.  At this point we will continue to observe as she takes through her carlos and subsequent hopefully return of counts she does appear that she is eating a little bit better and able to swallow.  Hopefully she will have some response that we can have removed to the outpatient setting.  Discussed situation with her extensively toxicity profiles agents.    Neutropenia  Secondary to chemotherapy explain the situation in the carlos demonstrating that drug is active in her system.  Hopefully Sha Cancer cells as well as normal bone marrow    Intractable nausea and vomiting  Lly will have some responsePatient appears to be better this morning is eating some Jell-O and states that she feels better very extensive involvement with disease process    Transaminitis  Secondary to liver involvement extensive visceral crisis    Moderate episode of recurrent major depressive disorder  Extensive involvement of liver by malignancy.  Hopefully chemotherapy will have some response over the next 7-10 days        Thank you for your consult. I will follow-up with patient. Please contact us if you have any additional questions.     Billy Olivo MD  Hematology/Oncology  [facility]

## 2025-03-05 NOTE — PROGRESS NOTES
Interdisciplinary Breast Cancer Conference    Serina Barriga    Female    Date Presented to Tumor Board: 03/05/25    Presenting Hospital / Clinic: [facility]    Tumor Laterality: Left    Breast Tumor Site: Overlapping sites    Presenter: Pedro Suttno MD    Reason for Consultation: Follow-Up from Prior Tumor Board    Specialties Present: Medical Oncology;Hematology;Radiation Oncology;Surgical Oncology;Pathology;Navigation;Genetics    Patient Status: a current patient    Treatment to Date: Neoadjuvant Chemotherapy    Clinical Trial Eligibility: Not discussed                   Cancer Staging   Malignant neoplasm of overlapping sites of left breast in female, estrogen receptor negative  Staging form: Breast, AJCC 8th Edition  - Clinical stage from 3/4/2025: Stage IV (cT2, cN1, pM1, G3, ER-, HI-, HER2+) - Signed by Billy Olivo MD on 3/4/2025      Recommended Plan: Chemotherapy    TB recommends RTC within the week to begin HER2 targeted therapy     Metastatic Site(s): Liver    Presentation at Cancer Conference: Prospective

## 2025-03-05 NOTE — ASSESSMENT & PLAN NOTE
Patient is likely to have ischemic hepatitis as evidenced by abnormalities in AST, ALT, T. BILI, ALBUMIN, and INR. Etiology includes multifactorial visceral crisis from metastatic breast cancer to liver, drug induced liver injury, chemotherapy. Continue to monitor liver function while treating underlying condition. Daily labs to include CMP and PT/INR.    AST   Date Value Ref Range Status   03/05/2025 481 (H) 10 - 40 U/L Final     ALT   Date Value Ref Range Status   03/05/2025 120 (H) 10 - 44 U/L Final     Consulted transfer center for higher level of care for hepatology service  Discussed with hem/onc and higher level of service not warranted  Associated with visceral crisis per hem/onc

## 2025-03-05 NOTE — PROGRESS NOTES
[facility] Medicine  Progress Note    Patient Name: Serina Barriga  MRN: [id number]  Patient Class: IP- Inpatient   Admission Date: 3/3/2025  Length of Stay: 2 days  Attending Physician: Da Carranza MD  Primary Care Provider: Obed Alford MD        Subjective     Principal Problem:Malignant neoplasm of overlapping sites of left breast in female, estrogen receptor negative        HPI:  50 year old female, comorbid conditions include Breast Cancer, Transaminitis, hyperbilirubinemia. Presented to Oncology clinic with c/o nausea/vomiting, diarrhea. Patient s/p first cycle chemotherapy on  with docetaxel. Planned for anti-her2 therapy as OP. Patient has known lesions to liver, MRI liver on  did not show obstructive process. Bili has been elevated in the range of 19-24 since diagnosis and has been jaundiced. In the oncology clinic: BP: 88/55, improved to 113/72 after IV fluids. Significant Labs: WBC: 1.22, CO2: 19, Cr: 2.0, Ca: 7.9, Ma.9, ALP: 613, Bili: 24.1, AST: 630, ALT: 149. Patient treated with IV fluids, anti-emetics in infusion center. Oncology recommended admission. Patient is a full code. Admitted to Hospital Medicine for management of Neutropenia, Intractable N/V, CINDI, Hyperbilirubinemia.     Overview/Hospital Course:  3/4 direct admit from clinic by primary oncologist due to intractable nausea, vomiting, and diarrhea. Received chemotherapy last hospitalization, -. Elevated liver enzymes worsening 2/2 visceral crisis. Patient endorses improvement in symptoms of nausea and vomiting.   3/5 endorses improvement in symptoms and would like to advance diet. Reports soft stool. Continue supportive care. Decrease intravenous fluid rate and intravenous antibiotic(s)     Interval History: See hospital course for today      Review of Systems   Constitutional:  Positive for activity change, appetite change (improving) and fatigue (improving). Negative for fever.   HENT:   Positive for mouth sores.    Respiratory:  Negative for shortness of breath.    Gastrointestinal:  Positive for nausea and vomiting (improving).   Skin:  Positive for color change.   Allergic/Immunologic: Positive for immunocompromised state.   Neurological:  Positive for weakness (improving).   Psychiatric/Behavioral:  Negative for agitation, behavioral problems, confusion, decreased concentration and dysphoric mood. The patient is not nervous/anxious.      Objective:     Vital Signs (Most Recent):  Temp: 98.6 °F (37 °C) (03/05/25 1142)  Pulse: 98 (03/05/25 1142)  Resp: 19 (03/05/25 1142)  BP: 115/68 (03/05/25 1142)  SpO2: 95 % (03/05/25 1142) Vital Signs (24h Range):  Temp:  [98.1 °F (36.7 °C)-99.1 °F (37.3 °C)] 98.6 °F (37 °C)  Pulse:  [] 98  Resp:  [13-19] 19  SpO2:  [92 %-97 %] 95 %  BP: ()/(48-68) 115/68     Weight: 67.6 kg (149 lb 0.5 oz)  Body mass index is 27.26 kg/m².    Intake/Output Summary (Last 24 hours) at 3/5/2025 1158  Last data filed at 3/4/2025 1921  Gross per 24 hour   Intake 1867.06 ml   Output --   Net 1867.06 ml         Physical Exam  Vitals and nursing note reviewed. Exam conducted with a chaperone present (family).   Constitutional:       General: She is not in acute distress.     Appearance: She is ill-appearing. She is not toxic-appearing.   HENT:      Head: Normocephalic and atraumatic.   Eyes:      General: Scleral icterus present.   Cardiovascular:      Rate and Rhythm: Normal rate.   Pulmonary:      Effort: Pulmonary effort is normal. No respiratory distress.   Abdominal:      General: There is distension.      Palpations: Abdomen is soft.      Tenderness: There is no abdominal tenderness.   Musculoskeletal:      Right lower leg: No edema.      Left lower leg: No edema.   Skin:     General: Skin is warm.      Coloration: Skin is jaundiced.   Neurological:      Mental Status: She is alert and oriented to person, place, and time.      Motor: Weakness present.   Psychiatric:          Mood and Affect: Mood normal.         Behavior: Behavior normal.               Significant Labs: All pertinent labs within the past 24 hours have been reviewed.  CBC:   Recent Labs   Lab 03/03/25  1844 03/04/25  0527 03/05/25  0538   WBC 0.74* 1.05* 1.60*   HGB 13.2 11.7* 11.8*   HCT 36.4* 33.0* 33.3*    149* 148*     CMP:   Recent Labs   Lab 03/03/25  1844 03/04/25  0527 03/05/25  0538    140 142   K 4.5 4.4 3.8   * 117* 118*   CO2 17* 16* 15*    100 84   BUN 47* 39* 31*   CREATININE 1.6* 1.3 1.3   CALCIUM 7.4* 6.7* 7.2*   PROT 5.3* 4.7* 4.8*   ALBUMIN 2.0* 1.7* 1.7*   BILITOT 24.7* 22.7* 25.3*   ALKPHOS 575* 552* 603*   * 515* 481*   * 122* 120*   ANIONGAP 10 7* 9       Significant Imaging: I have reviewed all pertinent imaging results/findings within the past 24 hours.  CXR: I have reviewed all pertinent results/findings within the past 24 hours and my personal findings are:  for picc line placement    Assessment and Plan     Assessment & Plan  Malignant neoplasm of overlapping sites of left breast in female, estrogen receptor negative  Followed by Oncology, known metastasis to liver, treated with Docetaxel on 2/24, planned for Anti-Her2 therapy as OP.     Continue supportive care  Follow up outpatient primary oncology      Moderate episode of recurrent major depressive disorder  Patient has persistent depression which is unknown and is currently controlled. Will Continue anti-depressant medications. We will not consult psychiatry at this time. Patient does not display psychosis at this time. Continue to monitor closely and adjust plan of care as needed.      Primary hypertension  Patient's blood pressure range in the last 24 hours was: BP  Min: 95/52  Max: 115/68.The patient's inpatient anti-hypertensive regimen is listed below:  Current Antihypertensives       Plan  - BP is uncontrolled, will adjust as follows: hold anti-hypertensive medications for  now    Transaminitis  LFTs elevated since prior to chemotherapy secondary to liver metastasis  MRI on 2/24 did not show obstructive process  Remains elevated in setting visceral crisis and hepatic involvement of metastatic breast cancer  Initiated transfer to higher level of care due to no hepatology service  Discussed with hem/onc and does not recommend  Chemotherapy induced diarrhea  Diarrhea present since chemotherapy    Cdiff negative     Intractable nausea and vomiting  Secondary to chemotherapy, patient and family report inadequate management of nausea and vomiting at home      --IV fluids, decreasing rate  Schedule antiemetics   Advance diet as tolerated     Neutropenia  Secondary to chemotherapy, s/p docetaxel on 2/24/25. Followed by oncology.    --Prophylactic antibiotics until ANC >1000  Empiric intravenous antibiotic(s) due to immunocompromised state  Urinalysis suggestive of acute cystitis  Last admission, urine culture negative   Blood cultures negative growth to date     CINDI (acute kidney injury)  CINDI is likely due to pre-renal azotemia due to dehydration. Baseline creatinine is  1.0 . Most recent creatinine and eGFR are listed below.  Recent Labs     03/03/25  1844 03/04/25  0527 03/05/25  0538   CREATININE 1.6* 1.3 1.3   EGFRNORACEVR 39* 50* 50*      Plan  - CINDI is improving  - Avoid nephrotoxins and renally dose meds for GFR listed above  - Monitor urine output, serial BMP, and adjust therapy as needed    Acute liver failure without hepatic coma  Patient is likely to have ischemic hepatitis as evidenced by abnormalities in AST, ALT, T. BILI, ALBUMIN, and INR. Etiology includes  multifactorial visceral crisis from metastatic breast cancer to liver, drug induced liver injury, chemotherapy . Continue to monitor liver function while treating underlying condition. Daily labs to include CMP and PT/INR.    AST   Date Value Ref Range Status   03/05/2025 481 (H) 10 - 40 U/L Final     ALT   Date Value Ref  Range Status   03/05/2025 120 (H) 10 - 44 U/L Final     Consulted transfer center for higher level of care for hepatology service  Discussed with hem/onc and higher level of service not warranted  Associated with visceral crisis per hem/onc    VTE Risk Mitigation (From admission, onward)           Ordered     IP VTE HIGH RISK PATIENT  Once         03/03/25 1656     Place sequential compression device  Until discontinued         03/03/25 1656                    Discharge Planning   ESTHELA: 3/4/2025     Code Status: Full Code   Medical Readiness for Discharge Date: 3/4/2025  Discharge Plan A: Home with family   Discharge Delays: None known at this time                    Da Carranza MD  Department of Hospital Medicine   [facility]

## 2025-03-05 NOTE — ASSESSMENT & PLAN NOTE
Patient has  depression which is  and is currently . Will  anti-depressant medications. We  consult psychiatry at this time. Patient  display psychosis at this time. Continue to monitor closely and adjust plan of care as needed.

## 2025-03-05 NOTE — ASSESSMENT & PLAN NOTE
Followed by Oncology, known metastasis to liver, treated with Docetaxel on 2/24, planned for Anti-Her2 therapy as OP.     Oncology consulted

## 2025-03-05 NOTE — ASSESSMENT & PLAN NOTE
Patient with visceral crisis HER2 positive breast cancer treated with carboplatin and Taxotere.  As an inpatient patient is currently pancytopenic at the present time secondary to drug very extensive conversation with the patient as well as her  at bedside clearly understands she has stage IV disease noncurable in nature.  At the request of Hospital Medicine I have spoken to them concerning this awaiting recovery of counts in outpatient setting would consider treatment with Herceptin and Perjeta at that particular point discussed implications and answered questions with them understanding of the gravity of this situation I offered this show them the extent of her disease on PET scan which she declined at the present time  03/05/2025.  Extensive conversation with her  as well as she.  Discussed the nature systemic chemotherapy with mucositis pancytopenia as side-effects.  There was concerns of transferred to [facility] for hepatology consultation I do not feel this is warranted at this point the patient has metastatic disease in her liver as well as in other locations variant we have given her first-line chemotherapy and awaiting for response outpatient administration of HER2 acting agents once patient is medically stable variant she clearly understands that she has a treatable but not curable malignancy.  At this point we will continue to observe as she takes through her carlos and subsequent hopefully return of counts she does appear that she is eating a little bit better and able to swallow.  Hopefully she will have some response that we can have removed to the outpatient setting.  Discussed situation with her extensively toxicity profiles agents.

## 2025-03-05 NOTE — ASSESSMENT & PLAN NOTE
Patient's blood pressure range in the last 24 hours was: BP  Min: 95/52  Max: 115/68.The patient's inpatient anti-hypertensive regimen is listed below:  Current Antihypertensives       Plan  - BP is uncontrolled, will adjust as follows: hold anti-hypertensive medications for now

## 2025-03-05 NOTE — ASSESSMENT & PLAN NOTE
Patient is likely to have ischemic hepatitis as evidenced by abnormalities in AST, ALT, T. BILI, ALBUMIN, and INR. Etiology includes multifactorial visceral crisis from metastatic breast cancer to liver, drug induced liver injury, chemotherapy. Continue to monitor liver function while treating underlying condition. Daily labs to include CMP and PT/INR.    AST   Date Value Ref Range Status   03/05/2025 481 (H) 10 - 40 U/L Final     ALT   Date Value Ref Range Status   03/05/2025 120 (H) 10 - 44 U/L Final     Consulted transfer center for higher level of care for hepatology service  Discussed with hem/onc and higher level of service not warranted

## 2025-03-06 ENCOUNTER — TELEPHONE (OUTPATIENT)
Dept: HEMATOLOGY/ONCOLOGY | Facility: CLINIC | Age: 50
End: 2025-03-06
Payer: COMMERCIAL

## 2025-03-06 NOTE — PLAN OF CARE
03/06/25 1054   Rounds   Attendance Provider;;Charge nurse;Physical therapist   Discharge Plan A Home with family   Why the patient remains in the hospital Requires continued medical care   Transition of Care Barriers None       Assessment/Plan:      * Malignant neoplasm of overlapping sites of left breast in female, estrogen receptor negative  Patient with visceral crisis HER2 positive breast cancer treated with carboplatin and Taxotere.  As an inpatient patient is currently pancytopenic at the present time secondary to drug very extensive conversation with the patient as well as her  at bedside clearly understands she has stage IV disease noncurable in nature.  At the request of Hospital Medicine I have spoken to them concerning this awaiting recovery of counts in outpatient setting would consider treatment with Herceptin and Perjeta at that particular point discussed implications and answered questions with them understanding of the gravity of this situation I offered this show them the extent of her disease on PET scan which she declined at the present time  03/05/2025.  Extensive conversation with her  as well as she.  Discussed the nature systemic chemotherapy with mucositis pancytopenia as side-effects.  There was concerns of transferred to [facility] for hepatology consultation I do not feel this is warranted at this point the patient has metastatic disease in her liver as well as in other locations variant we have given her first-line chemotherapy and awaiting for response outpatient administration of HER2 acting agents once patient is medically stable variant she clearly understands that she has a treatable but not curable malignancy.  At this point we will continue to observe as she takes through her carlos and subsequent hopefully return of counts she does appear that she is eating a little bit better and able to swallow.  Hopefully she will have some  response that we can have removed to the outpatient setting.  Discussed situation with her extensively toxicity profiles agents.  03/06/2025 patient's white count is improving liver functions stabilizing if patient is able to eat and drink would be able to be discharged to be treated with HER2 agents in outpatient setting.     Neutropenia  Secondary to chemotherapy explain the situation in the carlos demonstrating that drug is active in her system.  Hopefully [place] [facility] cells as well as normal bone marrow     Intractable nausea and vomiting  Lly will have some responsePatient appears to be better this morning is eating some Jell-O and states that she feels better very extensive involvement with disease process     Transaminitis  Secondary to liver involvement extensive visceral crisis     Moderate episode of recurrent major depressive disorder  Extensive involvement of liver by malignancy.  Hopefully chemotherapy will have some response over the next 7-10 days

## 2025-03-06 NOTE — ASSESSMENT & PLAN NOTE
LFTs elevated since prior to chemotherapy secondary to liver metastasis  MRI on 2/24 did not show obstructive process  Remains elevated in setting visceral crisis and hepatic involvement of metastatic breast cancer  Initiated transfer to higher level of care due to no hepatology service  Discussed with hem/onc and does not recommend    Remains elevated but stable

## 2025-03-06 NOTE — PLAN OF CARE
Discussed poc with pt, pt verbalized understanding  Purposeful rounding every 2hours  VS wnl  Cardiac monitoring in use, pt is NSR, tele monitor #[id number]  Fall precautions in place, remains injury free  Pt denies c/o nausea  Pain under control with PRN meds  IVFs  Accurate I&Os  Abx given as prescribed  Bed locked at lowest position  Call light within reach  Will cont with POC

## 2025-03-06 NOTE — ASSESSMENT & PLAN NOTE
Secondary to chemotherapy, patient and family report inadequate management of nausea and vomiting at home      Discontinue intravenous fluids  antiemetics prn  Advance diet as tolerated

## 2025-03-06 NOTE — ASSESSMENT & PLAN NOTE
CINDI is likely due to pre-renal azotemia due to dehydration. Baseline creatinine is 1.0. Most recent creatinine and eGFR are listed below.  Recent Labs     03/04/25  0527 03/05/25  0538 03/06/25  0537   CREATININE 1.3 1.3 1.3   EGFRNORACEVR 50* 50* 50*      Plan  - CINDI is improving  - Avoid nephrotoxins and renally dose meds for GFR listed above  - Monitor urine output, serial BMP, and adjust therapy as needed    Stable  Creatinine near baseline  Discontinue fluids  Encourage po intake

## 2025-03-06 NOTE — TELEPHONE ENCOUNTER
Contacted pt's  to advise that I had scheduled pt for f/u with Dr Esquivel next mon  and pending labs pt will get infusion on tues 3/11. Mr Villegas v/u.

## 2025-03-06 NOTE — ASSESSMENT & PLAN NOTE
Followed by Oncology, known metastasis to liver, treated with Docetaxel on 2/24, planned for Anti-Her2 therapy as OP.     Continue supportive care  Follow up outpatient primary oncology

## 2025-03-06 NOTE — PROGRESS NOTES
[facility] Medicine  Progress Note    Patient Name: Serina Barriga  MRN: [id number]  Patient Class: IP- Inpatient   Admission Date: 3/3/2025  Length of Stay: 3 days  Attending Physician: Da Carranza MD  Primary Care Provider: Obed Alford MD        Subjective     Principal Problem:Malignant neoplasm of overlapping sites of left breast in female, estrogen receptor negative        HPI:  50 year old female, comorbid conditions include Breast Cancer, Transaminitis, hyperbilirubinemia. Presented to Oncology clinic with c/o nausea/vomiting, diarrhea. Patient s/p first cycle chemotherapy on  with docetaxel. Planned for anti-her2 therapy as OP. Patient has known lesions to liver, MRI liver on  did not show obstructive process. Bili has been elevated in the range of 19-24 since diagnosis and has been jaundiced. In the oncology clinic: BP: 88/55, improved to 113/72 after IV fluids. Significant Labs: WBC: 1.22, CO2: 19, Cr: 2.0, Ca: 7.9, Ma.9, ALP: 613, Bili: 24.1, AST: 630, ALT: 149. Patient treated with IV fluids, anti-emetics in infusion center. Oncology recommended admission. Patient is a full code. Admitted to Hospital Medicine for management of Neutropenia, Intractable N/V, CINDI, Hyperbilirubinemia.     Overview/Hospital Course:  3/4 direct admit from clinic by primary oncologist due to intractable nausea, vomiting, and diarrhea. Received chemotherapy last hospitalization, -. Elevated liver enzymes worsening 2/2 visceral crisis. Patient endorses improvement in symptoms of nausea and vomiting.   3/5 endorses improvement in symptoms and would like to advance diet. Reports soft stool. Continue supportive care. Decrease intravenous fluid rate and intravenous antibiotic(s)   3/6 improvement with appetite but complains of mucositis. Good rest last night. Complains of loose stool. Wants to go home tomorrow. Neutropenia improving. Continue adjusting supportive care, discontinue  intravenous antibiotic(s), intravenous fluids, start imodium, and add nystatin. Correct k       Interval History: See hospital course for today      Review of Systems   Constitutional:  Positive for activity change (improving), appetite change (improving) and fatigue (improving).   HENT:  Positive for mouth sores and trouble swallowing.    Respiratory:  Negative for shortness of breath.    Gastrointestinal:  Positive for abdominal pain (improving) and nausea (improving). Negative for diarrhea and vomiting.        Loose stool   Allergic/Immunologic: Positive for immunocompromised state.   Neurological:  Positive for weakness.   Psychiatric/Behavioral:  Negative for agitation, behavioral problems, confusion, decreased concentration, dysphoric mood and sleep disturbance. The patient is not nervous/anxious.      Objective:     Vital Signs (Most Recent):  Temp: 99.3 °F (37.4 °C) (03/06/25 1208)  Pulse: 96 (03/06/25 1208)  Resp: 17 (03/06/25 1208)  BP: (!) 100/54 (03/06/25 1208)  SpO2: 96 % (03/06/25 1208) Vital Signs (24h Range):  Temp:  [98.1 °F (36.7 °C)-99.3 °F (37.4 °C)] 99.3 °F (37.4 °C)  Pulse:  [] 96  Resp:  [16-19] 17  SpO2:  [93 %-96 %] 96 %  BP: (100-115)/(51-56) 100/54     Weight: 67.6 kg (149 lb 0.5 oz)  Body mass index is 27.26 kg/m².    Intake/Output Summary (Last 24 hours) at 3/6/2025 1211  Last data filed at 3/6/2025 0822  Gross per 24 hour   Intake 675 ml   Output --   Net 675 ml         Physical Exam  Vitals and nursing note reviewed. Exam conducted with a chaperone present ().   Constitutional:       General: She is sleeping. She is not in acute distress.     Appearance: She is ill-appearing. She is not toxic-appearing.   HENT:      Head: Normocephalic and atraumatic.   Eyes:      General: Scleral icterus present.   Cardiovascular:      Rate and Rhythm: Normal rate.   Pulmonary:      Effort: Pulmonary effort is normal. No respiratory distress.   Abdominal:      Palpations: Abdomen is soft.       Tenderness: There is no abdominal tenderness.   Musculoskeletal:      Right lower leg: Edema present.      Left lower leg: Edema present.   Skin:     General: Skin is warm.      Coloration: Skin is jaundiced.   Neurological:      Mental Status: She is easily aroused.      Motor: Weakness present.   Psychiatric:         Behavior: Behavior is cooperative.               Significant Labs: All pertinent labs within the past 24 hours have been reviewed.  Bilirubin:   Recent Labs   Lab 02/24/25  0938 02/25/25  0419 03/03/25  1248 03/03/25  1844 03/04/25  0527 03/05/25  0538 03/06/25  0537   BILIDIR >14.0*  --   --  >14.0*  --   --   --    BILITOT 22.4*   < > 24.1* 24.7* 22.7* 25.3* 23.3*    < > = values in this interval not displayed.     Blood Culture: negative growth to date x 3 days  CBC:   Recent Labs   Lab 03/05/25  0538 03/06/25  0537   WBC 1.60* 2.68*   HGB 11.8* 11.3*   HCT 33.3* 32.3*   * 125*     CMP:   Recent Labs   Lab 03/05/25  0538 03/06/25  0537    143   K 3.8 3.2*   * 119*   CO2 15* 16*   GLU 84 87   BUN 31* 31*   CREATININE 1.3 1.3   CALCIUM 7.2* 7.1*   PROT 4.8* 4.6*   ALBUMIN 1.7* 1.5*   BILITOT 25.3* 23.3*   ALKPHOS 603* 600*   * 415*   * 109*   ANIONGAP 9 8       Magnesium:   Recent Labs   Lab 03/05/25  0538 03/06/25  0537   MG 2.5 2.5     Cdiff negative  Peripheral smear   Pathologist Review    Status: Final result         Component Ref Range & Units 03/06/25 0537   Pathologist Review Peripheral Smear  REVIEWED   Comment:  Electronically reviewed and signed by:  Griffin Chaves M.D.  Signed on 03/06/25 at 11:01  Erythrocytes with microcytosis and numerous target cell forms  And  occasional nucleated forms.  Granulocytopenia with scant, immature  granulocytes with occasional pseudo-Pelger-Huet forms.  Lymphocytes  appear hernia with occasional mature-appearing lymphocytes.  reactive-appearing monocytes.  Negative for increased blasts.  Slight  decrease in  morphologically normal platelets.   Resulting Agency  [facility]        Specimen Collected: 03/06/25 0537 Last Resulted: 03/06/25 1101 View Other Order Details          Significant Imaging: I have reviewed all pertinent imaging results/findings within the past 24 hours.    Assessment and Plan     Assessment & Plan  Malignant neoplasm of overlapping sites of left breast in female, estrogen receptor negative  Followed by Oncology, known metastasis to liver, treated with Docetaxel on 2/24, planned for Anti-Her2 therapy as OP.     Continue supportive care  Follow up outpatient primary oncology      Moderate episode of recurrent major depressive disorder  Patient has persistent depression which is unknown and is currently controlled. Will Continue anti-depressant medications. We will not consult psychiatry at this time. Patient does not display psychosis at this time. Continue to monitor closely and adjust plan of care as needed.      Primary hypertension  Patient's blood pressure range in the last 24 hours was: BP  Min: 100/54  Max: 115/56.The patient's inpatient anti-hypertensive regimen is listed below:  Current Antihypertensives       Plan  - BP is uncontrolled, will adjust as follows: hold anti-hypertensive medications for now    Transaminitis  LFTs elevated since prior to chemotherapy secondary to liver metastasis  MRI on 2/24 did not show obstructive process  Remains elevated in setting visceral crisis and hepatic involvement of metastatic breast cancer  Initiated transfer to higher level of care due to no hepatology service  Discussed with hem/onc and does not recommend    Remains elevated but stable  Chemotherapy induced diarrhea  Diarrhea present since chemotherapy    Cdiff negative   Imodium trial     Intractable nausea and vomiting  Secondary to chemotherapy, patient and family report inadequate management of nausea and vomiting at home      Discontinue intravenous fluids  antiemetics prn  Advance diet as tolerated      Neutropenia  Secondary to chemotherapy, s/p docetaxel on 2/24/25. Followed by oncology.    --Prophylactic antibiotics until ANC >1000  Empiric intravenous antibiotic(s) due to immunocompromised state  Urinalysis suggestive of acute cystitis  Last admission, urine culture negative   Blood cultures negative growth to date     Improving  Per hem/onc, can discharge tomorrow if continues to improve    CINDI (acute kidney injury)  CINDI is likely due to pre-renal azotemia due to dehydration. Baseline creatinine is  1.0 . Most recent creatinine and eGFR are listed below.  Recent Labs     03/04/25  0527 03/05/25  0538 03/06/25  0537   CREATININE 1.3 1.3 1.3   EGFRNORACEVR 50* 50* 50*      Plan  - CINDI is improving  - Avoid nephrotoxins and renally dose meds for GFR listed above  - Monitor urine output, serial BMP, and adjust therapy as needed    Stable  Creatinine near baseline  Discontinue fluids  Encourage po intake    Acute liver failure without hepatic coma  Patient is likely to have ischemic hepatitis as evidenced by abnormalities in AST, ALT, T. BILI, ALBUMIN, and INR. Etiology includes  multifactorial visceral crisis from metastatic breast cancer to liver, drug induced liver injury, chemotherapy . Continue to monitor liver function while treating underlying condition. Daily labs to include CMP and PT/INR.    AST   Date Value Ref Range Status   03/06/2025 415 (H) 10 - 40 U/L Final     ALT   Date Value Ref Range Status   03/06/2025 109 (H) 10 - 44 U/L Final     Consulted transfer center for higher level of care for hepatology service  Discussed with hem/onc and higher level of service not warranted  Associated with visceral crisis per hem/onc    VTE Risk Mitigation (From admission, onward)           Ordered     IP VTE HIGH RISK PATIENT  Once         03/03/25 1656     Place sequential compression device  Until discontinued         03/03/25 1656                    Discharge Planning   ESTHELA: 3/4/2025     Code Status: Full  Code   Medical Readiness for Discharge Date: 3/4/2025  Discharge Plan A: Home with family   Discharge Delays: None known at this time                    Da Carranza MD  Department of Hospital Medicine   '[facility]

## 2025-03-06 NOTE — ASSESSMENT & PLAN NOTE
Patient with visceral crisis HER2 positive breast cancer treated with carboplatin and Taxotere.  As an inpatient patient is currently pancytopenic at the present time secondary to drug very extensive conversation with the patient as well as her  at bedside clearly understands she has stage IV disease noncurable in nature.  At the request of Hospital Medicine I have spoken to them concerning this awaiting recovery of counts in outpatient setting would consider treatment with Herceptin and Perjeta at that particular point discussed implications and answered questions with them understanding of the gravity of this situation I offered this show them the extent of her disease on PET scan which she declined at the present time  03/05/2025.  Extensive conversation with her  as well as she.  Discussed the nature systemic chemotherapy with mucositis pancytopenia as side-effects.  There was concerns of transferred to [facility] for hepatology consultation I do not feel this is warranted at this point the patient has metastatic disease in her liver as well as in other locations variant we have given her first-line chemotherapy and awaiting for response outpatient administration of HER2 acting agents once patient is medically stable variant she clearly understands that she has a treatable but not curable malignancy.  At this point we will continue to observe as she takes through her carlos and subsequent hopefully return of counts she does appear that she is eating a little bit better and able to swallow.  Hopefully she will have some response that we can have removed to the outpatient setting.  Discussed situation with her extensively toxicity profiles agents.  03/06/2025 patient's white count is improving liver functions stabilizing if patient is able to eat and drink would be able to be discharged to be treated with HER2 agents in outpatient setting.

## 2025-03-06 NOTE — ASSESSMENT & PLAN NOTE
Secondary to chemotherapy, s/p docetaxel on 2/24/25. Followed by oncology.    --Prophylactic antibiotics until ANC >1000  Empiric intravenous antibiotic(s) due to immunocompromised state  Urinalysis suggestive of acute cystitis  Last admission, urine culture negative   Blood cultures negative growth to date     Improving  Per hem/onc, can discharge tomorrow if continues to improve

## 2025-03-06 NOTE — ASSESSMENT & PLAN NOTE
Patient's blood pressure range in the last 24 hours was: BP  Min: 100/54  Max: 115/56.The patient's inpatient anti-hypertensive regimen is listed below:  Current Antihypertensives       Plan  - BP is uncontrolled, will adjust as follows: hold anti-hypertensive medications for now

## 2025-03-06 NOTE — PROGRESS NOTES
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  Pancytopenic nausea vomiting variant I was asked to see the patient for further evaluation by Hospital Medicine team ECOG status 3    Interval History:  Patient states that she is feeling better with more strength less nausea    Oncology Treatment Plan:   OP BREAST DOCEtaxel trastuzumab pertuzumab Q3W    Medications:  Continuous Infusions:   0.9% NaCl   Intravenous Continuous 75 mL/hr at 03/05/25 1957 New Bag at 03/05/25 1957     Scheduled Meds:   cefTRIAXone (Rocephin) IV (PEDS and ADULTS)  2 g Intravenous Q24H    electrolytes-dextrose  400 mL Oral Q4H    mupirocin   Nasal BID     PRN Meds:  Current Facility-Administered Medications:     aluminum-magnesium hydroxide-simethicone, 30 mL, Oral, QID PRN    dextrose 50%, 12.5 g, Intravenous, PRN    dextrose 50%, 25 g, Intravenous, PRN    glucagon (human recombinant), 1 mg, Intramuscular, PRN    glucose, 16 g, Oral, PRN    glucose, 24 g, Oral, PRN    magic mouthwash (diphenhydrAMINE 12.5 mg/5 mL 20 mL, aluminum & magnesium hydroxide-simethicone (MYLANTA) 20 mL, LIDOcaine HCl 2% (XYLOCAINE) 20 mL) solution, 15 mL, Swish & Spit, Q4H PRN    morphine, 2 mg, Intravenous, Q4H PRN    naloxone, 0.02 mg, Intravenous, PRN    ondansetron, 8 mg, Intravenous, Q8H PRN    polyethylene glycol, 17 g, Oral, BID PRN    promethazine (PHENERGAN) 12.5 mg in 0.9% NaCl 50 mL IVPB, 12.5 mg, Intravenous, Q6H PRN    sodium chloride 0.9%, 10 mL, Intravenous, Q12H PRN    traMADoL, 50 mg, Oral, Q6H PRN    zolpidem, 5 mg, Oral, Daily PRN     Review of Systems    Constitutional:  Positive for fatigue. Negative for activity change, appetite change, chills, diaphoresis, fever and unexpected weight change.   HENT:  Negative for congestion, dental problem, drooling, ear discharge, ear pain, facial swelling, hearing loss, mouth sores, nosebleeds, postnasal drip, rhinorrhea, sinus pressure, sneezing, sore throat, tinnitus, trouble swallowing and voice change.    Eyes:  Negative for photophobia, pain, discharge, redness, itching and visual disturbance.   Respiratory:  Negative for cough, choking, chest tightness, shortness of breath, wheezing and stridor.    Cardiovascular:  Negative for chest pain, palpitations and leg swelling.   Gastrointestinal:  Negative for abdominal distention, abdominal pain, anal bleeding, blood in stool, constipation, diarrhea, nausea, rectal pain and vomiting.   Endocrine: Negative for cold intolerance, heat intolerance, polydipsia, polyphagia and polyuria.   Genitourinary:  Negative for decreased urine volume, difficulty urinating, dyspareunia, dysuria, enuresis, flank pain, frequency, genital sores, hematuria, menstrual problem, pelvic pain, urgency, vaginal bleeding, vaginal discharge and vaginal pain.   Musculoskeletal:  Negative for arthralgias, back pain, gait problem, joint swelling, myalgias, neck pain and neck stiffness.   Skin:  Negative for color change, pallor and rash.   Allergic/Immunologic: Negative for environmental allergies, food allergies and immunocompromised state.   Neurological:  Positive for weakness. Negative for dizziness, tremors, seizures, syncope, facial asymmetry, speech difficulty, light-headedness, numbness and headaches.   Hematological:  Negative for adenopathy. Does not bruise/bleed easily.   Psychiatric/Behavioral:  Negative for agitation, behavioral problems, confusion, decreased concentration, dysphoric mood, hallucinations, self-injury, sleep disturbance and suicidal ideas. The patient is not nervous/anxious and  is not hyperactive.      Objective:     Vital Signs (Most Recent):  Temp: 98.3 °F (36.8 °C) (03/06/25 0531)  Pulse: 95 (03/06/25 0531)  Resp: 18 (03/06/25 0531)  BP: (!) 101/53 (03/06/25 0531)  SpO2: 96 % (03/06/25 0531) Vital Signs (24h Range):  Temp:  [98.1 °F (36.7 °C)-99.1 °F (37.3 °C)] 98.3 °F (36.8 °C)  Pulse:  [] 95  Resp:  [16-19] 18  SpO2:  [92 %-96 %] 96 %  BP: (101-115)/(51-68) 101/53     Weight: 67.6 kg (149 lb 0.5 oz)  Body mass index is 27.26 kg/m².  Body surface area is 1.72 meters squared.      Intake/Output Summary (Last 24 hours) at 3/6/2025 0712  Last data filed at 3/6/2025 0537  Gross per 24 hour   Intake 475 ml   Output --   Net 475 ml        Physical Exam  Vitals reviewed.   Constitutional:       General: She is not in acute distress.     Appearance: She is well-developed. She is ill-appearing. She is not diaphoretic.   HENT:      Head: Normocephalic and atraumatic.      Right Ear: External ear normal.      Left Ear: External ear normal.      Nose: Nose normal.      Right Sinus: No maxillary sinus tenderness or frontal sinus tenderness.      Left Sinus: No maxillary sinus tenderness or frontal sinus tenderness.      Mouth/Throat:      Pharynx: No oropharyngeal exudate.   Eyes:      General: Lids are normal. No scleral icterus.        Right eye: No discharge.         Left eye: No discharge.      Conjunctiva/sclera: Conjunctivae normal.      Right eye: Right conjunctiva is not injected. No hemorrhage.     Left eye: Left conjunctiva is not injected. No hemorrhage.     Pupils: Pupils are equal, round, and reactive to light.   Neck:      Thyroid: No thyromegaly.      Vascular: No JVD.      Trachea: No tracheal deviation.   Cardiovascular:      Rate and Rhythm: Normal rate.   Pulmonary:      Effort: Pulmonary effort is normal. No respiratory distress.      Breath sounds: No stridor.   Chest:      Chest wall: No tenderness.   Abdominal:      General: Bowel sounds are normal. There is no  "distension.      Palpations: Abdomen is soft. There is no hepatomegaly, splenomegaly or mass.      Tenderness: There is no abdominal tenderness. There is no rebound.   Musculoskeletal:         General: No tenderness. Normal range of motion.      Cervical back: Normal range of motion and neck supple.   Lymphadenopathy:      Cervical: No cervical adenopathy.      Upper Body:      Right upper body: No supraclavicular adenopathy.      Left upper body: No supraclavicular adenopathy.   Skin:     General: Skin is dry.      Findings: No erythema or rash.   Neurological:      Mental Status: She is alert and oriented to person, place, and time.      Cranial Nerves: No cranial nerve deficit.      Coordination: Coordination normal.   Psychiatric:         Behavior: Behavior normal.         Thought Content: Thought content normal.         Judgment: Judgment normal.          Significant Labs:   BMP:   Recent Labs   Lab 03/05/25  0538 03/06/25  0537   GLU 84 87    143   K 3.8 3.2*   * 119*   CO2 15* 16*   BUN 31* 31*   CREATININE 1.3 1.3   CALCIUM 7.2* 7.1*   MG 2.5 2.5   , CBC:   Recent Labs   Lab 03/05/25  0538 03/06/25  0537   WBC 1.60* 2.68*   HGB 11.8* 11.3*   HCT 33.3* 32.3*   * 125*   , CMP:   Recent Labs   Lab 03/05/25  0538 03/06/25  0537    143   K 3.8 3.2*   * 119*   CO2 15* 16*   GLU 84 87   BUN 31* 31*   CREATININE 1.3 1.3   CALCIUM 7.2* 7.1*   PROT 4.8* 4.6*   ALBUMIN 1.7* 1.5*   BILITOT 25.3* 23.3*   ALKPHOS 603* 600*   * 415*   * 109*   ANIONGAP 9 8   , Coagulation:   Recent Labs   Lab 03/04/25  0800   INR 2.2*   , Haptoglobin: No results for input(s): "HAPTOGLOBIN" in the last 48 hours., Immunology: No results for input(s): "SPEP", "AURORA", "MARYANNE", "FREELAMBDALI" in the last 48 hours., LDH: No results for input(s): "LDHCSF", "BFSOURCE" in the last 48 hours., LFTs:   Recent Labs   Lab 03/05/25  0538 03/06/25  0537   * 109*   * 415*   ALKPHOS 603* 600*   BILITOT " "25.3* 23.3*   PROT 4.8* 4.6*   ALBUMIN 1.7* 1.5*   , Reticulocytes: No results for input(s): "RETIC" in the last 48 hours., Tumor Markers: No results for input(s): "PSA", "CEA", "", "AFPTM", "[id number]", "" in the last 48 hours.    Invalid input(s): "ALGTM", Uric Acid No results for input(s): "URICACID" in the last 48 hours., and Urine Studies: No results for input(s): "COLORU", "APPEARANCEUA", "PHUR", "SPECGRAV", "PROTEINUA", "GLUCUA", "KETONESU", "BILIRUBINUA", "OCCULTUA", "NITRITE", "UROBILINOGEN", "LEUKOCYTESUR", "RBCUA", "WBCUA", "BACTERIA", "SQUAMEPITHEL", "HYALINECASTS" in the last 48 hours.    Invalid input(s): "WRIGHTSUR"    Diagnostic Results:  I have reviewed all pertinent imaging results/findings within the past 24 hours.  Assessment/Plan:     * Malignant neoplasm of overlapping sites of left breast in female, estrogen receptor negative  Patient with visceral crisis HER2 positive breast cancer treated with carboplatin and Taxotere.  As an inpatient patient is currently pancytopenic at the present time secondary to drug very extensive conversation with the patient as well as her  at bedside clearly understands she has stage IV disease noncurable in nature.  At the request of Hospital Medicine I have spoken to them concerning this awaiting recovery of counts in outpatient setting would consider treatment with Herceptin and Perjeta at that particular point discussed implications and answered questions with them understanding of the gravity of this situation I offered this show them the extent of her disease on PET scan which she declined at the present time  03/05/2025.  Extensive conversation with her  as well as she.  Discussed the nature systemic chemotherapy with mucositis pancytopenia as side-effects.  There was concerns of transferred to [facility] for hepatology consultation I do not feel this is warranted at this point the patient has metastatic disease " in her liver as well as in other locations variant we have given her first-line chemotherapy and awaiting for response outpatient administration of HER2 acting agents once patient is medically stable variant she clearly understands that she has a treatable but not curable malignancy.  At this point we will continue to observe as she takes through her carlos and subsequent hopefully return of counts she does appear that she is eating a little bit better and able to swallow.  Hopefully she will have some response that we can have removed to the outpatient setting.  Discussed situation with her extensively toxicity profiles agents.  03/06/2025 patient's white count is improving liver functions stabilizing if patient is able to eat and drink would be able to be discharged to be treated with HER2 agents in outpatient setting.    Neutropenia  Secondary to chemotherapy explain the situation in the carlos demonstrating that drug is active in her system.  Hopefully Sha Cancer cells as well as normal bone marrow    Intractable nausea and vomiting  Lly will have some responsePatient appears to be better this morning is eating some Jell-O and states that she feels better very extensive involvement with disease process    Transaminitis  Secondary to liver involvement extensive visceral crisis    Moderate episode of recurrent major depressive disorder  Extensive involvement of liver by malignancy.  Hopefully chemotherapy will have some response over the next 7-10 days        Thank you for your consult. I will follow-up with patient. Please contact us if you have any additional questions.     Billy Olivo MD  Hematology/Oncology  [facility]

## 2025-03-06 NOTE — ASSESSMENT & PLAN NOTE
Patient is likely to have ischemic hepatitis as evidenced by abnormalities in AST, ALT, T. BILI, ALBUMIN, and INR. Etiology includes multifactorial visceral crisis from metastatic breast cancer to liver, drug induced liver injury, chemotherapy. Continue to monitor liver function while treating underlying condition. Daily labs to include CMP and PT/INR.    AST   Date Value Ref Range Status   03/06/2025 415 (H) 10 - 40 U/L Final     ALT   Date Value Ref Range Status   03/06/2025 109 (H) 10 - 44 U/L Final     Consulted transfer center for higher level of care for hepatology service  Discussed with hem/onc and higher level of service not warranted  Associated with visceral crisis per hem/onc

## 2025-03-06 NOTE — SUBJECTIVE & OBJECTIVE
Interval History:  Patient states that she is feeling better with more strength less nausea    Oncology Treatment Plan:   OP BREAST DOCEtaxel trastuzumab pertuzumab Q3W    Medications:  Continuous Infusions:   0.9% NaCl   Intravenous Continuous 75 mL/hr at 03/05/25 1957 New Bag at 03/05/25 1957     Scheduled Meds:   cefTRIAXone (Rocephin) IV (PEDS and ADULTS)  2 g Intravenous Q24H    electrolytes-dextrose  400 mL Oral Q4H    mupirocin   Nasal BID     PRN Meds:  Current Facility-Administered Medications:     aluminum-magnesium hydroxide-simethicone, 30 mL, Oral, QID PRN    dextrose 50%, 12.5 g, Intravenous, PRN    dextrose 50%, 25 g, Intravenous, PRN    glucagon (human recombinant), 1 mg, Intramuscular, PRN    glucose, 16 g, Oral, PRN    glucose, 24 g, Oral, PRN    magic mouthwash (diphenhydrAMINE 12.5 mg/5 mL 20 mL, aluminum & magnesium hydroxide-simethicone (MYLANTA) 20 mL, LIDOcaine HCl 2% (XYLOCAINE) 20 mL) solution, 15 mL, Swish & Spit, Q4H PRN    morphine, 2 mg, Intravenous, Q4H PRN    naloxone, 0.02 mg, Intravenous, PRN    ondansetron, 8 mg, Intravenous, Q8H PRN    polyethylene glycol, 17 g, Oral, BID PRN    promethazine (PHENERGAN) 12.5 mg in 0.9% NaCl 50 mL IVPB, 12.5 mg, Intravenous, Q6H PRN    sodium chloride 0.9%, 10 mL, Intravenous, Q12H PRN    traMADoL, 50 mg, Oral, Q6H PRN    zolpidem, 5 mg, Oral, Daily PRN     Review of Systems   Constitutional:  Positive for fatigue. Negative for activity change, appetite change, chills, diaphoresis, fever and unexpected weight change.   HENT:  Negative for congestion, dental problem, drooling, ear discharge, ear pain, facial swelling, hearing loss, mouth sores, nosebleeds, postnasal drip, rhinorrhea, sinus pressure, sneezing, sore throat, tinnitus, trouble swallowing and voice change.    Eyes:  Negative for photophobia, pain, discharge, redness, itching and visual disturbance.   Respiratory:  Negative for cough, choking, chest tightness, shortness of breath,  wheezing and stridor.    Cardiovascular:  Negative for chest pain, palpitations and leg swelling.   Gastrointestinal:  Negative for abdominal distention, abdominal pain, anal bleeding, blood in stool, constipation, diarrhea, nausea, rectal pain and vomiting.   Endocrine: Negative for cold intolerance, heat intolerance, polydipsia, polyphagia and polyuria.   Genitourinary:  Negative for decreased urine volume, difficulty urinating, dyspareunia, dysuria, enuresis, flank pain, frequency, genital sores, hematuria, menstrual problem, pelvic pain, urgency, vaginal bleeding, vaginal discharge and vaginal pain.   Musculoskeletal:  Negative for arthralgias, back pain, gait problem, joint swelling, myalgias, neck pain and neck stiffness.   Skin:  Negative for color change, pallor and rash.   Allergic/Immunologic: Negative for environmental allergies, food allergies and immunocompromised state.   Neurological:  Positive for weakness. Negative for dizziness, tremors, seizures, syncope, facial asymmetry, speech difficulty, light-headedness, numbness and headaches.   Hematological:  Negative for adenopathy. Does not bruise/bleed easily.   Psychiatric/Behavioral:  Negative for agitation, behavioral problems, confusion, decreased concentration, dysphoric mood, hallucinations, self-injury, sleep disturbance and suicidal ideas. The patient is not nervous/anxious and is not hyperactive.      Objective:     Vital Signs (Most Recent):  Temp: 98.3 °F (36.8 °C) (03/06/25 0531)  Pulse: 95 (03/06/25 0531)  Resp: 18 (03/06/25 0531)  BP: (!) 101/53 (03/06/25 0531)  SpO2: 96 % (03/06/25 0531) Vital Signs (24h Range):  Temp:  [98.1 °F (36.7 °C)-99.1 °F (37.3 °C)] 98.3 °F (36.8 °C)  Pulse:  [] 95  Resp:  [16-19] 18  SpO2:  [92 %-96 %] 96 %  BP: (101-115)/(51-68) 101/53     Weight: 67.6 kg (149 lb 0.5 oz)  Body mass index is 27.26 kg/m².  Body surface area is 1.72 meters squared.      Intake/Output Summary (Last 24 hours) at 3/6/2025  0712  Last data filed at 3/6/2025 0537  Gross per 24 hour   Intake 475 ml   Output --   Net 475 ml        Physical Exam  Vitals reviewed.   Constitutional:       General: She is not in acute distress.     Appearance: She is well-developed. She is ill-appearing. She is not diaphoretic.   HENT:      Head: Normocephalic and atraumatic.      Right Ear: External ear normal.      Left Ear: External ear normal.      Nose: Nose normal.      Right Sinus: No maxillary sinus tenderness or frontal sinus tenderness.      Left Sinus: No maxillary sinus tenderness or frontal sinus tenderness.      Mouth/Throat:      Pharynx: No oropharyngeal exudate.   Eyes:      General: Lids are normal. No scleral icterus.        Right eye: No discharge.         Left eye: No discharge.      Conjunctiva/sclera: Conjunctivae normal.      Right eye: Right conjunctiva is not injected. No hemorrhage.     Left eye: Left conjunctiva is not injected. No hemorrhage.     Pupils: Pupils are equal, round, and reactive to light.   Neck:      Thyroid: No thyromegaly.      Vascular: No JVD.      Trachea: No tracheal deviation.   Cardiovascular:      Rate and Rhythm: Normal rate.   Pulmonary:      Effort: Pulmonary effort is normal. No respiratory distress.      Breath sounds: No stridor.   Chest:      Chest wall: No tenderness.   Abdominal:      General: Bowel sounds are normal. There is no distension.      Palpations: Abdomen is soft. There is no hepatomegaly, splenomegaly or mass.      Tenderness: There is no abdominal tenderness. There is no rebound.   Musculoskeletal:         General: No tenderness. Normal range of motion.      Cervical back: Normal range of motion and neck supple.   Lymphadenopathy:      Cervical: No cervical adenopathy.      Upper Body:      Right upper body: No supraclavicular adenopathy.      Left upper body: No supraclavicular adenopathy.   Skin:     General: Skin is dry.      Findings: No erythema or rash.   Neurological:      Mental  "Status: She is alert and oriented to person, place, and time.      Cranial Nerves: No cranial nerve deficit.      Coordination: Coordination normal.   Psychiatric:         Behavior: Behavior normal.         Thought Content: Thought content normal.         Judgment: Judgment normal.          Significant Labs:   BMP:   Recent Labs   Lab 03/05/25 0538 03/06/25  0537   GLU 84 87    143   K 3.8 3.2*   * 119*   CO2 15* 16*   BUN 31* 31*   CREATININE 1.3 1.3   CALCIUM 7.2* 7.1*   MG 2.5 2.5   , CBC:   Recent Labs   Lab 03/05/25 0538 03/06/25  0537   WBC 1.60* 2.68*   HGB 11.8* 11.3*   HCT 33.3* 32.3*   * 125*   , CMP:   Recent Labs   Lab 03/05/25 0538 03/06/25  0537    143   K 3.8 3.2*   * 119*   CO2 15* 16*   GLU 84 87   BUN 31* 31*   CREATININE 1.3 1.3   CALCIUM 7.2* 7.1*   PROT 4.8* 4.6*   ALBUMIN 1.7* 1.5*   BILITOT 25.3* 23.3*   ALKPHOS 603* 600*   * 415*   * 109*   ANIONGAP 9 8   , Coagulation:   Recent Labs   Lab 03/04/25  0800   INR 2.2*   , Haptoglobin: No results for input(s): "HAPTOGLOBIN" in the last 48 hours., Immunology: No results for input(s): "SPEP", "AURORA", "MARYANNE", "FREELAMBDALI" in the last 48 hours., LDH: No results for input(s): "LDHCSF", "BFSOURCE" in the last 48 hours., LFTs:   Recent Labs   Lab 03/05/25 0538 03/06/25  0537   * 109*   * 415*   ALKPHOS 603* 600*   BILITOT 25.3* 23.3*   PROT 4.8* 4.6*   ALBUMIN 1.7* 1.5*   , Reticulocytes: No results for input(s): "RETIC" in the last 48 hours., Tumor Markers: No results for input(s): "PSA", "CEA", "", "AFPTM", "[id number]", "" in the last 48 hours.    Invalid input(s): "ALGTM", Uric Acid No results for input(s): "URICACID" in the last 48 hours., and Urine Studies: No results for input(s): "COLORU", "APPEARANCEUA", "PHUR", "SPECGRAV", "PROTEINUA", "GLUCUA", "KETONESU", "BILIRUBINUA", "OCCULTUA", "NITRITE", "UROBILINOGEN", "LEUKOCYTESUR", "RBCUA", "WBCUA", "BACTERIA", "SQUAMEPITHEL", " ""HYALINECASTS" in the last 48 hours.    Invalid input(s): "WRIGHTSUR"    Diagnostic Results:  I have reviewed all pertinent imaging results/findings within the past 24 hours.  "

## 2025-03-07 ENCOUNTER — TELEPHONE (OUTPATIENT)
Dept: HEMATOLOGY/ONCOLOGY | Facility: CLINIC | Age: 50
End: 2025-03-07
Payer: COMMERCIAL

## 2025-03-07 NOTE — DISCHARGE SUMMARY
[facility] Medicine  Discharge Summary      Patient Name: Serina Barriga  MRN: [id number]  FRAN: [id number]  Patient Class: IP- Inpatient  Admission Date: 3/3/2025  Hospital Length of Stay: 4 days  Discharge Date and Time:  2025 3:08 PM  Attending Physician: Da Carranza MD   Discharging Provider: Da Carranza MD  Primary Care Provider: Obed Alford MD    Primary Care Team: [facility] MEDICINE A    HPI:   50 year old female, comorbid conditions include Breast Cancer, Transaminitis, hyperbilirubinemia. Presented to Oncology clinic with c/o nausea/vomiting, diarrhea. Patient s/p first cycle chemotherapy on  with docetaxel. Planned for anti-her2 therapy as OP. Patient has known lesions to liver, MRI liver on  did not show obstructive process. Bili has been elevated in the range of 19-24 since diagnosis and has been jaundiced. In the oncology clinic: BP: 88/55, improved to 113/72 after IV fluids. Significant Labs: WBC: 1.22, CO2: 19, Cr: 2.0, Ca: 7.9, Ma.9, ALP: 613, Bili: 24.1, AST: 630, ALT: 149. Patient treated with IV fluids, anti-emetics in infusion center. Oncology recommended admission. Patient is a full code. Admitted to Hospital Medicine for management of Neutropenia, Intractable N/V, CINDI, Hyperbilirubinemia.     * No surgery found *      Hospital Course:   3/4 direct admit from clinic by primary oncologist due to intractable nausea, vomiting, and diarrhea. Received chemotherapy last hospitalization, -. Elevated liver enzymes worsening 2/2 visceral crisis. Patient endorses improvement in symptoms of nausea and vomiting.   3/5 endorses improvement in symptoms and would like to advance diet. Reports soft stool. Continue supportive care. Decrease intravenous fluid rate and intravenous antibiotic(s)   3/6 improvement with appetite but complains of mucositis. Good rest last night. Complains of loose stool. Wants to go home tomorrow. Neutropenia improving. Continue  adjusting supportive care, discontinue intravenous antibiotic(s), intravenous fluids, start imodium, and add nystatin. Correct k     3/7    Wbc levels improved to normal. Endorses improvement in symptoms. Main complaint is mucositis. Discussed options for relief. Follow up with primary oncologist. After discussing with oncology, ok to discharge home with follow up with primary oncologist. Received empiric zosyn and rocephin for immunocompromised state. Discontinued due to diarrhea. Cdiff negative.     Hypokalemia improved. Diet recommendations provided on discharge.     Insomnia improved with ambien. Discussed risk v benefits. Defer to primary providers for medication management.    Hypotension. Holding blood pressure medication(s) on discharge.     Creatinine elevated on admission. Improved with intravenous fluids. gfr stable on discharge. At risk for worsening due to poor po intake, nausea, vomiting, diarrhea, and overall health. Encouraged po intake with milk, sprite, ice cubes. Discussed supportive management with zofran and lomotil.    Transaminitis elevated but stable.    No acute distress. No respiratory distress. On room air. Jaundice. Moist oral mucosa. Scleral icterus. Ill appearing. Weakness. AO3.  at bedside.    Patient seen and evaluated by me. Patient was determined to be suitable for discharge. Patient deemed stable for discharge to home.       Goals of Care Treatment Preferences:  Code Status: Full Code      SDOH Screening:  The patient was screened for utility difficulties, food insecurity, transport difficulties, housing insecurity, and interpersonal safety and there were no concerns identified this admission.     Consults:   Consults (From admission, onward)          Status Ordering Provider     Inpatient consult to Social Work/Case Management  Once        Provider:  (Not yet assigned)    JOSE Frey     Inpatient consult to Hematology/Oncology  Once        Provider:  (Not yet  assigned)    Acknowledged JOSE FORTE     Inpatient consult to PICC team ([facility])  Once        Provider:  (Not yet assigned)    Completed JOSE FORTE            Assessment & Plan  Malignant neoplasm of overlapping sites of left breast in female, estrogen receptor negative  Followed by Oncology, known metastasis to liver, treated with Docetaxel on 2/24, planned for Anti-Her2 therapy as OP.     Continue supportive care  Follow up outpatient primary oncology      Moderate episode of recurrent major depressive disorder  Patient has persistent depression which is unknown and is currently controlled. Will Continue anti-depressant medications. We will not consult psychiatry at this time. Patient does not display psychosis at this time. Continue to monitor closely and adjust plan of care as needed.      Primary hypertension  Patient's blood pressure range in the last 24 hours was: BP  Min: 91/48  Max: 104/59.The patient's inpatient anti-hypertensive regimen is listed below:  Current Antihypertensives       Plan  - BP is uncontrolled, will adjust as follows: hold anti-hypertensive medications for now    Transaminitis  LFTs elevated since prior to chemotherapy secondary to liver metastasis  MRI on 2/24 did not show obstructive process  Remains elevated in setting visceral crisis and hepatic involvement of metastatic breast cancer  Initiated transfer to higher level of care due to no hepatology service  Discussed with hem/onc and does not recommend    Remains elevated but stable  Chemotherapy induced diarrhea  Diarrhea present since chemotherapy    Cdiff negative   Imodium trial     Intractable nausea and vomiting  Secondary to chemotherapy, patient and family report inadequate management of nausea and vomiting at home      Discontinue intravenous fluids  antiemetics prn  Advance diet as tolerated     Neutropenia  Secondary to chemotherapy, s/p docetaxel on 2/24/25. Followed by oncology.    --Prophylactic antibiotics until  ANC >1000  Empiric intravenous antibiotic(s) due to immunocompromised state  Urinalysis suggestive of acute cystitis  Last admission, urine culture negative   Blood cultures negative growth to date     Improving  Per hem/onc, can discharge tomorrow if continues to improve    CINDI (acute kidney injury)  CINDI is likely due to pre-renal azotemia due to dehydration. Baseline creatinine is  1.0 . Most recent creatinine and eGFR are listed below.  Recent Labs     03/05/25  0538 03/06/25  0537 03/07/25  0446   CREATININE 1.3 1.3 1.9*   EGFRNORACEVR 50* 50* 32*      Plan  - CINDI is improving  - Avoid nephrotoxins and renally dose meds for GFR listed above  - Monitor urine output, serial BMP, and adjust therapy as needed    Stable  Creatinine near baseline  Discontinue fluids  Encourage po intake    Acute liver failure without hepatic coma  Patient is likely to have ischemic hepatitis as evidenced by abnormalities in AST, ALT, T. BILI, ALBUMIN, and INR. Etiology includes  multifactorial visceral crisis from metastatic breast cancer to liver, drug induced liver injury, chemotherapy . Continue to monitor liver function while treating underlying condition. Daily labs to include CMP and PT/INR.    AST   Date Value Ref Range Status   03/07/2025 430 (H) 10 - 40 U/L Final     ALT   Date Value Ref Range Status   03/07/2025 117 (H) 10 - 44 U/L Final     Consulted transfer center for higher level of care for hepatology service  Discussed with hem/onc and higher level of service not warranted  Associated with visceral crisis per hem/onc    Final Active Diagnoses:    Diagnosis Date Noted POA    PRINCIPAL PROBLEM:  Malignant neoplasm of overlapping sites of left breast in female, estrogen receptor negative [C50.812, Z17.1] 02/21/2025 Not Applicable    Acute liver failure without hepatic coma [K72.00] 03/04/2025 Yes    Chemotherapy induced diarrhea [K52.1, T45.1X5A] 03/03/2025 Yes    Intractable nausea and vomiting [R11.2] 03/03/2025 Yes     "Neutropenia [D70.9] 03/03/2025 Yes    CINDI (acute kidney injury) [N17.9] 03/03/2025 Yes    Transaminitis [R74.01] 02/21/2025 Yes    Primary hypertension [I10] 11/29/2022 Yes    Moderate episode of recurrent major depressive disorder [F33.1] 11/29/2022 Yes      Problems Resolved During this Admission:       Discharged Condition: stable    Disposition: Another Health Care Inst*    Follow Up:   Follow-up Information       Obed Alford MD. Schedule an appointment as soon as possible for a visit in 3 day(s).    Specialty: Pediatrics  Why: hospital follow up  Contact information:  [address] [facility] [address] [phone]               Niyah Lopez MD. Go to.    Specialty: Hematology and Oncology  Why: hospital follow up  Contact information:  [address]  [place] [address] [phone]                           Patient Instructions:      COMMODE FOR HOME USE     Order Specific Question Answer Comments   Type: Standard    Height: 5' 2" (1.575 m)    Weight: 73.2 kg (161 lb 6 oz)    Does patient have medical equipment at home? none    Length of need (1-99 months): 12      Activity as tolerated       Significant Diagnostic Studies: Labs: CMP   Recent Labs   Lab 03/06/25  0537 03/07/25  0446    141   K 3.2* 3.5   * 116*   CO2 16* 16*   GLU 87 75   BUN 31* 46*   CREATININE 1.3 1.9*   CALCIUM 7.1* 7.4*   PROT 4.6* 4.7*   ALBUMIN 1.5* 1.6*   BILITOT 23.3* 23.5*   ALKPHOS 600* 679*   * 430*   * 117*   ANIONGAP 8 9   , CBC   Recent Labs   Lab 03/06/25  0537 03/07/25  0446   WBC 2.68* 6.30   HGB 11.3* 11.6*   HCT 32.3* 33.4*   * 133*   , All labs within the past 24 hours have been reviewed, and procalcitonin 1.64; lactate 0.9  Microbiology: Blood Culture   Lab Results   Component Value Date    LABBLOO No Growth to date 03/03/2025    LABBLOO No Growth to date 03/03/2025    LABBLOO No Growth to date 03/03/2025    LABBLOO No Growth to date 03/03/2025    LABBLOO No Growth " to date 03/03/2025    LABBLOO No Growth to date 03/03/2025    LABBLOO No Growth to date 03/03/2025    LABBLOO No Growth to date 03/03/2025    and cdiff negative   Radiology: X-Ray: CXR: portable showing picc line placement    Pending Diagnostic Studies:       None           Medications:  Reconciled Home Medications:      Medication List        START taking these medications      nystatin 100,000 unit/mL suspension  Commonly known as: MYCOSTATIN  Take 5 mLs (500,000 Units total) by mouth 4 (four) times daily with meals and nightly. for 10 days            CONTINUE taking these medications      (MAGIC MOUTHWASH) 1:1:1 BENADRYL 12.5MG/5ML LIQ, ALUMINUM & MAGNESIUM  Swish and spit 15 mLs every 4 (four) hours as needed (mouth&/or throat pain). for mouth sores     diphenoxylate-atropine 2.5-0.025 mg 2.5-0.025 mg per tablet  Commonly known as: LomotiL  Take 1 tablet by mouth 4 (four) times daily as needed for Diarrhea.     LIDOcaine-prilocaine cream  Commonly known as: EMLA  Apply topically as needed.     loperamide 2 mg capsule  Commonly known as: IMODIUM  Take 1 capsule (2 mg total) by mouth every 3 (three) hours as needed for Diarrhea.     OLANZapine 5 MG tablet  Commonly known as: ZyPREXA  Take 1 tablet (5 mg total) by mouth every evening.     sertraline 50 MG tablet  Commonly known as: ZOLOFT  Take 50 mg by mouth once daily.     traMADoL 50 mg tablet  Commonly known as: ULTRAM  Take 1 tablet (50 mg total) by mouth every 12 (twelve) hours as needed for Pain.            STOP taking these medications      lisinopriL 10 MG tablet            ASK your doctor about these medications      ondansetron 4 MG Tbdl  Commonly known as: ZOFRAN-ODT  Take 1 tablet (4 mg total) by mouth every 8 (eight) hours as needed (nausea).  Ask about: Should I take this medication?              Indwelling Lines/Drains at time of discharge:   Lines/Drains/Airways       Peripherally Inserted Central Catheter Line  Duration             PICC Double  Lumen 03/04/25 0915 left basilic 3 days              Central Venous Catheter Line  Duration                  PowerPort A Cath Single Lumen 02/26/25 1050 Internal Jugular Right 9 days                    Time spent on the discharge of patient: 45 minutes         Da Carranza MD  Department of Hospital Medicine  '[facility]

## 2025-03-07 NOTE — PLAN OF CARE
Discussed poc with pt, pt verbalized understanding  Purposeful rounding every 2hours  VS wnl  Cardiac monitoring in use, pt is NSR, tele monitor #[id number]  Blood glucose monitoring   Fall precautions in place, remains injury free  Pain and nausea under control with PRN meds  Accurate I&Os  Abx given as prescribed  Bed locked at lowest position  Call light within reach  Will cont with POC

## 2025-03-07 NOTE — ASSESSMENT & PLAN NOTE
Patient with visceral crisis HER2 positive breast cancer treated with carboplatin and Taxotere.  As an inpatient patient is currently pancytopenic at the present time secondary to drug very extensive conversation with the patient as well as her  at bedside clearly understands she has stage IV disease noncurable in nature.  At the request of Hospital Medicine I have spoken to them concerning this awaiting recovery of counts in outpatient setting would consider treatment with Herceptin and Perjeta at that particular point discussed implications and answered questions with them understanding of the gravity of this situation I offered this show them the extent of her disease on PET scan which she declined at the present time  03/05/2025.  Extensive conversation with her  as well as she.  Discussed the nature systemic chemotherapy with mucositis pancytopenia as side-effects.  There was concerns of transferred to [facility] for hepatology consultation I do not feel this is warranted at this point the patient has metastatic disease in her liver as well as in other locations variant we have given her first-line chemotherapy and awaiting for response outpatient administration of HER2 acting agents once patient is medically stable variant she clearly understands that she has a treatable but not curable malignancy.  At this point we will continue to observe as she takes through her carlos and subsequent hopefully return of counts she does appear that she is eating a little bit better and able to swallow.  Hopefully she will have some response that we can have removed to the outpatient setting.  Discussed situation with her extensively toxicity profiles agents.  03/06/2025 patient's white count is improving liver functions stabilizing if patient is able to eat and drink would be able to be discharged to be treated with HER2 agents in outpatient setting.  03/07/2025 patient reports decrease  swelling in tenderness involving her left breast where known disease at present variant the patient appears to be improving in overall sense of well-being.  Does have grade 2 mucositis at present but white count is resolving.  Will be seen and evaluated for next cycle of treatment will probably need to wait for systemic treatment but will able to be give HER2 agents in outpatient setting variant discussed implications of answered questions with the told her at this point that I would remain off of her blood pressure medicines in if she uses the bathroom I would definitely to bring me to the bathroom to avoid fall

## 2025-03-07 NOTE — SUBJECTIVE & OBJECTIVE
Interval History:  Patient reports marked decrease in swelling involving her left breast    Oncology Treatment Plan:   OP BREAST DOCEtaxel trastuzumab pertuzumab Q3W    Medications:  Continuous Infusions:  Scheduled Meds:   electrolytes-dextrose  400 mL Oral Q4H    mupirocin   Nasal BID    nystatin  500,000 Units Oral QID (WM & HS)    potassium bicarbonate  25 mEq Oral BID     PRN Meds:  Current Facility-Administered Medications:     aluminum-magnesium hydroxide-simethicone, 30 mL, Oral, QID PRN    dextrose 50%, 12.5 g, Intravenous, PRN    dextrose 50%, 25 g, Intravenous, PRN    glucagon (human recombinant), 1 mg, Intramuscular, PRN    glucose, 16 g, Oral, PRN    glucose, 24 g, Oral, PRN    magic mouthwash (diphenhydrAMINE 12.5 mg/5 mL 20 mL, aluminum & magnesium hydroxide-simethicone (MYLANTA) 20 mL, LIDOcaine HCl 2% (XYLOCAINE) 20 mL) solution, 15 mL, Swish & Spit, Q4H PRN    naloxone, 0.02 mg, Intravenous, PRN    ondansetron, 8 mg, Intravenous, Q8H PRN    polyethylene glycol, 17 g, Oral, BID PRN    sodium chloride 0.9%, 10 mL, Intravenous, Q12H PRN    traMADoL, 50 mg, Oral, Q6H PRN     Review of Systems   Constitutional:  Positive for fatigue. Negative for activity change, appetite change, chills, diaphoresis, fever and unexpected weight change.   HENT:  Positive for mouth sores. Negative for congestion, dental problem, drooling, ear discharge, ear pain, facial swelling, hearing loss, nosebleeds, postnasal drip, rhinorrhea, sinus pressure, sneezing, sore throat, tinnitus, trouble swallowing and voice change.    Eyes:  Negative for photophobia, pain, discharge, redness, itching and visual disturbance.   Respiratory:  Negative for cough, choking, chest tightness, shortness of breath, wheezing and stridor.    Cardiovascular:  Negative for chest pain, palpitations and leg swelling.   Gastrointestinal:  Negative for abdominal distention, abdominal pain, anal bleeding, blood in stool, constipation, diarrhea, nausea,  rectal pain and vomiting.   Endocrine: Negative for cold intolerance, heat intolerance, polydipsia, polyphagia and polyuria.   Genitourinary:  Negative for decreased urine volume, difficulty urinating, dyspareunia, dysuria, enuresis, flank pain, frequency, genital sores, hematuria, menstrual problem, pelvic pain, urgency, vaginal bleeding, vaginal discharge and vaginal pain.   Musculoskeletal:  Negative for arthralgias, back pain, gait problem, joint swelling, myalgias, neck pain and neck stiffness.   Skin:  Negative for color change, pallor and rash.   Allergic/Immunologic: Negative for environmental allergies, food allergies and immunocompromised state.   Neurological:  Positive for weakness. Negative for dizziness, tremors, seizures, syncope, facial asymmetry, speech difficulty, light-headedness, numbness and headaches.   Hematological:  Negative for adenopathy. Does not bruise/bleed easily.   Psychiatric/Behavioral:  Positive for dysphoric mood. Negative for agitation, behavioral problems, confusion, decreased concentration, hallucinations, self-injury, sleep disturbance and suicidal ideas. The patient is nervous/anxious. The patient is not hyperactive.      Objective:     Vital Signs (Most Recent):  Temp: 97.9 °F (36.6 °C) (03/07/25 0431)  Pulse: 98 (03/07/25 0530)  Resp: 20 (03/07/25 0431)  BP: (!) 91/48 (03/07/25 0431)  SpO2: (!) 94 % (03/07/25 0431) Vital Signs (24h Range):  Temp:  [97.4 °F (36.3 °C)-99.3 °F (37.4 °C)] 97.9 °F (36.6 °C)  Pulse:  [85-98] 98  Resp:  [17-20] 20  SpO2:  [94 %-96 %] 94 %  BP: ()/(48-58) 91/48     Weight: 73.2 kg (161 lb 6 oz)  Body mass index is 29.52 kg/m².  Body surface area is 1.79 meters squared.      Intake/Output Summary (Last 24 hours) at 3/7/2025 0717  Last data filed at 3/7/2025 0540  Gross per 24 hour   Intake 1880 ml   Output --   Net 1880 ml        Physical Exam  Vitals reviewed.   Constitutional:       General: She is not in acute distress.     Appearance: She  is well-developed. She is ill-appearing. She is not diaphoretic.   HENT:      Head: Normocephalic and atraumatic.      Right Ear: External ear normal.      Left Ear: External ear normal.      Nose: Nose normal.      Right Sinus: No maxillary sinus tenderness or frontal sinus tenderness.      Left Sinus: No maxillary sinus tenderness or frontal sinus tenderness.      Mouth/Throat:      Pharynx: No oropharyngeal exudate.   Eyes:      General: Lids are normal. No scleral icterus.        Right eye: No discharge.         Left eye: No discharge.      Conjunctiva/sclera: Conjunctivae normal.      Right eye: Right conjunctiva is not injected. No hemorrhage.     Left eye: Left conjunctiva is not injected. No hemorrhage.     Pupils: Pupils are equal, round, and reactive to light.   Neck:      Thyroid: No thyromegaly.      Vascular: No JVD.      Trachea: No tracheal deviation.   Cardiovascular:      Rate and Rhythm: Normal rate.   Pulmonary:      Effort: Pulmonary effort is normal. No respiratory distress.      Breath sounds: No stridor.   Chest:      Chest wall: No tenderness.   Abdominal:      General: Bowel sounds are normal. There is no distension.      Palpations: Abdomen is soft. There is no hepatomegaly, splenomegaly or mass.      Tenderness: There is no abdominal tenderness. There is no rebound.   Musculoskeletal:         General: No tenderness. Normal range of motion.      Cervical back: Normal range of motion and neck supple.   Lymphadenopathy:      Cervical: No cervical adenopathy.      Upper Body:      Right upper body: No supraclavicular adenopathy.      Left upper body: No supraclavicular adenopathy.   Skin:     General: Skin is dry.      Findings: No erythema or rash.   Neurological:      Mental Status: She is alert and oriented to person, place, and time.      Cranial Nerves: No cranial nerve deficit.      Motor: Weakness present.      Coordination: Coordination abnormal.   Psychiatric:         Behavior:  "Behavior normal.         Thought Content: Thought content normal.         Judgment: Judgment normal.          Significant Labs:   BMP:   Recent Labs   Lab 03/06/25  0537 03/07/25 0446   GLU 87 75    141   K 3.2* 3.5   * 116*   CO2 16* 16*   BUN 31* 46*   CREATININE 1.3 1.9*   CALCIUM 7.1* 7.4*   MG 2.5  --    , CBC:   Recent Labs   Lab 03/06/25  0537 03/07/25 0446   WBC 2.68* 6.30   HGB 11.3* 11.6*   HCT 32.3* 33.4*   * 133*   , CMP:   Recent Labs   Lab 03/06/25  0537 03/07/25 0446    141   K 3.2* 3.5   * 116*   CO2 16* 16*   GLU 87 75   BUN 31* 46*   CREATININE 1.3 1.9*   CALCIUM 7.1* 7.4*   PROT 4.6* 4.7*   ALBUMIN 1.5* 1.6*   BILITOT 23.3* 23.5*   ALKPHOS 600* 679*   * 430*   * 117*   ANIONGAP 8 9   , Coagulation: No results for input(s): "PT", "INR", "APTT" in the last 48 hours., Haptoglobin: No results for input(s): "HAPTOGLOBIN" in the last 48 hours., Immunology: No results for input(s): "SPEP", "AURORA", "MARYANNE", "FREELAMBDALI" in the last 48 hours., LDH: No results for input(s): "LDHCSF", "BFSOURCE" in the last 48 hours., LFTs:   Recent Labs   Lab 03/06/25  0537 03/07/25 0446   * 117*   * 430*   ALKPHOS 600* 679*   BILITOT 23.3* 23.5*   PROT 4.6* 4.7*   ALBUMIN 1.5* 1.6*   , Reticulocytes: No results for input(s): "RETIC" in the last 48 hours., Tumor Markers: No results for input(s): "PSA", "CEA", "", "AFPTM", "QV2034", "" in the last 48 hours.    Invalid input(s): "ALGTM", Uric Acid No results for input(s): "URICACID" in the last 48 hours., and Urine Studies: No results for input(s): "COLORU", "APPEARANCEUA", "PHUR", "SPECGRAV", "PROTEINUA", "GLUCUA", "KETONESU", "BILIRUBINUA", "OCCULTUA", "NITRITE", "UROBILINOGEN", "LEUKOCYTESUR", "RBCUA", "WBCUA", "BACTERIA", "SQUAMEPITHEL", "HYALINECASTS" in the last 48 hours.    Invalid input(s): "WRIGHTSUR"    Diagnostic Results:  I have reviewed all pertinent imaging results/findings within the past " 24 hours.

## 2025-03-07 NOTE — ASSESSMENT & PLAN NOTE
Patient is likely to have ischemic hepatitis as evidenced by abnormalities in AST, ALT, T. BILI, ALBUMIN, and INR. Etiology includes multifactorial visceral crisis from metastatic breast cancer to liver, drug induced liver injury, chemotherapy. Continue to monitor liver function while treating underlying condition. Daily labs to include CMP and PT/INR.    AST   Date Value Ref Range Status   03/07/2025 430 (H) 10 - 40 U/L Final     ALT   Date Value Ref Range Status   03/07/2025 117 (H) 10 - 44 U/L Final     Consulted transfer center for higher level of care for hepatology service  Discussed with hem/onc and higher level of service not warranted  Associated with visceral crisis per hem/onc

## 2025-03-07 NOTE — ASSESSMENT & PLAN NOTE
CINDI is likely due to pre-renal azotemia due to dehydration. Baseline creatinine is 1.0. Most recent creatinine and eGFR are listed below.  Recent Labs     03/05/25  0538 03/06/25  0537 03/07/25  0446   CREATININE 1.3 1.3 1.9*   EGFRNORACEVR 50* 50* 32*      Plan  - CINDI is improving  - Avoid nephrotoxins and renally dose meds for GFR listed above  - Monitor urine output, serial BMP, and adjust therapy as needed    Stable  Creatinine near baseline  Discontinue fluids  Encourage po intake

## 2025-03-07 NOTE — PROGRESS NOTES
AVS given to patient and care giver. No questions or concerns at this time. PICC was removed and pressure was held for 15 minues- dry dressing applied at the time of discharge. Patient was wheeled down to private vehicle in stable condition with all belongings.

## 2025-03-07 NOTE — PLAN OF CARE
Plan of care reviewed with with patient with verbal understanding. Chart and orders reviewed. Patient remains free from falls this shift, safety precautions in place. Patient resting comfortably in bed. Pain controlled with as needed pain medication. Purposeful rounding with bed in lowest position, side rails up, call light and personal items within reach. Heart monitor in place. Will continue to monitor until the end of shift. No needs at this time.    Problem: Adult Inpatient Plan of Care  Goal: Plan of Care Review  Outcome: Progressing  Goal: Patient-Specific Goal (Individualized)  Outcome: Progressing  Goal: Absence of Hospital-Acquired Illness or Injury  Outcome: Progressing  Goal: Optimal Comfort and Wellbeing  Outcome: Progressing  Goal: Readiness for Transition of Care  Outcome: Progressing     Problem: Acute Kidney Injury/Impairment  Goal: Fluid and Electrolyte Balance  Outcome: Progressing  Goal: Improved Oral Intake  Outcome: Progressing  Goal: Effective Renal Function  Outcome: Progressing     Problem: Skin Injury Risk Increased  Goal: Skin Health and Integrity  Outcome: Progressing     Problem: Infection  Goal: Absence of Infection Signs and Symptoms  Outcome: Progressing

## 2025-03-07 NOTE — PLAN OF CARE
[facility] - Med Surg  Discharge Final Note    Primary Care Provider: Obed Alford MD    Expected Discharge Date: 3/7/2025    Final Discharge Note (most recent)       Final Note - 03/07/25 0925          Final Note    Assessment Type Final Discharge Note     Anticipated Discharge Disposition Home or Self Care        Post-Acute Status    Discharge Delays None known at this time                   No d/c needs or orders at this time.     1210 BSC order send to [facility]; pending approval and delivery.     1248 Sw spoke with pt's spouse, Bakari, who stated they do not need or want a BSC.

## 2025-03-07 NOTE — ASSESSMENT & PLAN NOTE
Patient's blood pressure range in the last 24 hours was: BP  Min: 91/48  Max: 104/59.The patient's inpatient anti-hypertensive regimen is listed below:  Current Antihypertensives       Plan  - BP is uncontrolled, will adjust as follows: hold anti-hypertensive medications for now

## 2025-03-07 NOTE — TELEPHONE ENCOUNTER
LVM for pt advising of Tx ed appt for Monday at 9 am after appt with Dr Lopez. Provided direct number for questions or concerns

## 2025-03-07 NOTE — PROGRESS NOTES
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  Pancytopenic nausea vomiting variant I was asked to see the patient for further evaluation by Hospital Medicine team ECOG status 3    Interval History:  Patient reports marked decrease in swelling involving her left breast    Oncology Treatment Plan:   OP BREAST DOCEtaxel trastuzumab pertuzumab Q3W    Medications:  Continuous Infusions:  Scheduled Meds:   electrolytes-dextrose  400 mL Oral Q4H    mupirocin   Nasal BID    nystatin  500,000 Units Oral QID (WM & HS)    potassium bicarbonate  25 mEq Oral BID     PRN Meds:  Current Facility-Administered Medications:     aluminum-magnesium hydroxide-simethicone, 30 mL, Oral, QID PRN    dextrose 50%, 12.5 g, Intravenous, PRN    dextrose 50%, 25 g, Intravenous, PRN    glucagon (human recombinant), 1 mg, Intramuscular, PRN    glucose, 16 g, Oral, PRN    glucose, 24 g, Oral, PRN    magic mouthwash (diphenhydrAMINE 12.5 mg/5 mL 20 mL, aluminum & magnesium hydroxide-simethicone (MYLANTA) 20 mL, LIDOcaine HCl 2% (XYLOCAINE) 20 mL) solution, 15 mL, Swish & Spit, Q4H PRN    naloxone, 0.02 mg, Intravenous, PRN    ondansetron, 8 mg, Intravenous, Q8H PRN    polyethylene glycol, 17 g, Oral, BID PRN    sodium chloride 0.9%, 10 mL, Intravenous, Q12H PRN    traMADoL, 50 mg, Oral, Q6H PRN     Review of Systems   Constitutional:  Positive for fatigue. Negative for activity change, appetite change, chills, diaphoresis, fever and unexpected weight change.   HENT:  Positive for mouth sores. Negative for congestion, dental problem, drooling, ear discharge, ear pain,  facial swelling, hearing loss, nosebleeds, postnasal drip, rhinorrhea, sinus pressure, sneezing, sore throat, tinnitus, trouble swallowing and voice change.    Eyes:  Negative for photophobia, pain, discharge, redness, itching and visual disturbance.   Respiratory:  Negative for cough, choking, chest tightness, shortness of breath, wheezing and stridor.    Cardiovascular:  Negative for chest pain, palpitations and leg swelling.   Gastrointestinal:  Negative for abdominal distention, abdominal pain, anal bleeding, blood in stool, constipation, diarrhea, nausea, rectal pain and vomiting.   Endocrine: Negative for cold intolerance, heat intolerance, polydipsia, polyphagia and polyuria.   Genitourinary:  Negative for decreased urine volume, difficulty urinating, dyspareunia, dysuria, enuresis, flank pain, frequency, genital sores, hematuria, menstrual problem, pelvic pain, urgency, vaginal bleeding, vaginal discharge and vaginal pain.   Musculoskeletal:  Negative for arthralgias, back pain, gait problem, joint swelling, myalgias, neck pain and neck stiffness.   Skin:  Negative for color change, pallor and rash.   Allergic/Immunologic: Negative for environmental allergies, food allergies and immunocompromised state.   Neurological:  Positive for weakness. Negative for dizziness, tremors, seizures, syncope, facial asymmetry, speech difficulty, light-headedness, numbness and headaches.   Hematological:  Negative for adenopathy. Does not bruise/bleed easily.   Psychiatric/Behavioral:  Positive for dysphoric mood. Negative for agitation, behavioral problems, confusion, decreased concentration, hallucinations, self-injury, sleep disturbance and suicidal ideas. The patient is nervous/anxious. The patient is not hyperactive.      Objective:     Vital Signs (Most Recent):  Temp: 97.9 °F (36.6 °C) (03/07/25 0431)  Pulse: 98 (03/07/25 0530)  Resp: 20 (03/07/25 0431)  BP: (!) 91/48 (03/07/25 0431)  SpO2: (!) 94 % (03/07/25 0431)  Vital Signs (24h Range):  Temp:  [97.4 °F (36.3 °C)-99.3 °F (37.4 °C)] 97.9 °F (36.6 °C)  Pulse:  [85-98] 98  Resp:  [17-20] 20  SpO2:  [94 %-96 %] 94 %  BP: ()/(48-58) 91/48     Weight: 73.2 kg (161 lb 6 oz)  Body mass index is 29.52 kg/m².  Body surface area is 1.79 meters squared.      Intake/Output Summary (Last 24 hours) at 3/7/2025 0729  Last data filed at 3/7/2025 0540  Gross per 24 hour   Intake 1880 ml   Output --   Net 1880 ml        Physical Exam  Vitals reviewed.   Constitutional:       General: She is not in acute distress.     Appearance: She is well-developed. She is ill-appearing. She is not diaphoretic.   HENT:      Head: Normocephalic and atraumatic.      Right Ear: External ear normal.      Left Ear: External ear normal.      Nose: Nose normal.      Right Sinus: No maxillary sinus tenderness or frontal sinus tenderness.      Left Sinus: No maxillary sinus tenderness or frontal sinus tenderness.      Mouth/Throat:      Pharynx: No oropharyngeal exudate.   Eyes:      General: Lids are normal. No scleral icterus.        Right eye: No discharge.         Left eye: No discharge.      Conjunctiva/sclera: Conjunctivae normal.      Right eye: Right conjunctiva is not injected. No hemorrhage.     Left eye: Left conjunctiva is not injected. No hemorrhage.     Pupils: Pupils are equal, round, and reactive to light.   Neck:      Thyroid: No thyromegaly.      Vascular: No JVD.      Trachea: No tracheal deviation.   Cardiovascular:      Rate and Rhythm: Normal rate.   Pulmonary:      Effort: Pulmonary effort is normal. No respiratory distress.      Breath sounds: No stridor.   Chest:      Chest wall: No tenderness.   Abdominal:      General: Bowel sounds are normal. There is no distension.      Palpations: Abdomen is soft. There is no hepatomegaly, splenomegaly or mass.      Tenderness: There is no abdominal tenderness. There is no rebound.   Musculoskeletal:         General: No tenderness. Normal range of  "motion.      Cervical back: Normal range of motion and neck supple.   Lymphadenopathy:      Cervical: No cervical adenopathy.      Upper Body:      Right upper body: No supraclavicular adenopathy.      Left upper body: No supraclavicular adenopathy.   Skin:     General: Skin is dry.      Findings: No erythema or rash.   Neurological:      Mental Status: She is alert and oriented to person, place, and time.      Cranial Nerves: No cranial nerve deficit.      Motor: Weakness present.      Coordination: Coordination abnormal.   Psychiatric:         Behavior: Behavior normal.         Thought Content: Thought content normal.         Judgment: Judgment normal.          Significant Labs:   BMP:   Recent Labs   Lab 03/06/25  0537 03/07/25  0446   GLU 87 75    141   K 3.2* 3.5   * 116*   CO2 16* 16*   BUN 31* 46*   CREATININE 1.3 1.9*   CALCIUM 7.1* 7.4*   MG 2.5  --    , CBC:   Recent Labs   Lab 03/06/25 0537 03/07/25 0446   WBC 2.68* 6.30   HGB 11.3* 11.6*   HCT 32.3* 33.4*   * 133*   , CMP:   Recent Labs   Lab 03/06/25  0537 03/07/25  0446    141   K 3.2* 3.5   * 116*   CO2 16* 16*   GLU 87 75   BUN 31* 46*   CREATININE 1.3 1.9*   CALCIUM 7.1* 7.4*   PROT 4.6* 4.7*   ALBUMIN 1.5* 1.6*   BILITOT 23.3* 23.5*   ALKPHOS 600* 679*   * 430*   * 117*   ANIONGAP 8 9   , Coagulation: No results for input(s): "PT", "INR", "APTT" in the last 48 hours., Haptoglobin: No results for input(s): "HAPTOGLOBIN" in the last 48 hours., Immunology: No results for input(s): "SPEP", "AURORA", "MARYANNE", "FREELAMBDALI" in the last 48 hours., LDH: No results for input(s): "LDHCSF", "BFSOURCE" in the last 48 hours., LFTs:   Recent Labs   Lab 03/06/25 0537 03/07/25  0446   * 117*   * 430*   ALKPHOS 600* 679*   BILITOT 23.3* 23.5*   PROT 4.6* 4.7*   ALBUMIN 1.5* 1.6*   , Reticulocytes: No results for input(s): "RETIC" in the last 48 hours., Tumor Markers: No results for input(s): "PSA", "CEA", " """, "AFPTM", "HF2665", "" in the last 48 hours.    Invalid input(s): "ALGTM", Uric Acid No results for input(s): "URICACID" in the last 48 hours., and Urine Studies: No results for input(s): "COLORU", "APPEARANCEUA", "PHUR", "SPECGRAV", "PROTEINUA", "GLUCUA", "KETONESU", "BILIRUBINUA", "OCCULTUA", "NITRITE", "UROBILINOGEN", "LEUKOCYTESUR", "RBCUA", "WBCUA", "BACTERIA", "SQUAMEPITHEL", "HYALINECASTS" in the last 48 hours.    Invalid input(s): "WRIGHTSUR"    Diagnostic Results:  I have reviewed all pertinent imaging results/findings within the past 24 hours.  Assessment/Plan:     * Malignant neoplasm of overlapping sites of left breast in female, estrogen receptor negative  Patient with visceral crisis HER2 positive breast cancer treated with carboplatin and Taxotere.  As an inpatient patient is currently pancytopenic at the present time secondary to drug very extensive conversation with the patient as well as her  at bedside clearly understands she has stage IV disease noncurable in nature.  At the request of Hospital Medicine I have spoken to them concerning this awaiting recovery of counts in outpatient setting would consider treatment with Herceptin and Perjeta at that particular point discussed implications and answered questions with them understanding of the gravity of this situation I offered this show them the extent of her disease on PET scan which she declined at the present time  03/05/2025.  Extensive conversation with her  as well as she.  Discussed the nature systemic chemotherapy with mucositis pancytopenia as side-effects.  There was concerns of transferred to [facility] for hepatology consultation I do not feel this is warranted at this point the patient has metastatic disease in her liver as well as in other locations variant we have given her first-line chemotherapy and awaiting for response outpatient administration of HER2 acting agents once patient " is medically stable variant she clearly understands that she has a treatable but not curable malignancy.  At this point we will continue to observe as she takes through her carlos and subsequent hopefully return of counts she does appear that she is eating a little bit better and able to swallow.  Hopefully she will have some response that we can have removed to the outpatient setting.  Discussed situation with her extensively toxicity profiles agents.  03/06/2025 patient's white count is improving liver functions stabilizing if patient is able to eat and drink would be able to be discharged to be treated with HER2 agents in outpatient setting.  03/07/2025 patient reports decrease swelling in tenderness involving her left breast where known disease at present variant the patient appears to be improving in overall sense of well-being.  Does have grade 2 mucositis at present but white count is resolving.  Will be seen and evaluated for next cycle of treatment will probably need to wait for systemic treatment but will able to be give HER2 agents in outpatient setting variant discussed implications of answered questions with the told her at this point that I would remain off of her blood pressure medicines in if she uses the bathroom I would definitely to bring me to the bathroom to avoid fall    Neutropenia  Secondary to chemotherapy explain the situation in the carlos demonstrating that drug is active in her system.  Hopefully Sha Cancer cells as well as normal bone marrow    Intractable nausea and vomiting  Lly will have some responsePatient appears to be better this morning is eating some Jell-O and states that she feels better very extensive involvement with disease process    Transaminitis  Secondary to liver involvement extensive visceral crisis    Moderate episode of recurrent major depressive disorder  Extensive involvement of liver by malignancy.  Hopefully chemotherapy will have some response over the next  7-10 days        Thank you for your consult. I will follow-up with patient. Please contact us if you have any additional questions.     Billy Olivo MD  Hematology/Oncology  [facility]

## 2025-03-09 PROBLEM — R04.0 EPISTAXIS: Status: ACTIVE | Noted: 2025-01-01

## 2025-03-09 PROBLEM — Z71.89 GOALS OF CARE, COUNSELING/DISCUSSION: Status: ACTIVE | Noted: 2025-01-01

## 2025-03-09 PROBLEM — R65.21 SEPSIS WITH ACUTE RENAL FAILURE AND SEPTIC SHOCK: Status: ACTIVE | Noted: 2025-01-01

## 2025-03-09 PROBLEM — K12.31 MUCOSITIS DUE TO CHEMOTHERAPY: Status: ACTIVE | Noted: 2025-01-01

## 2025-03-09 PROBLEM — A41.9 SEPSIS WITH ACUTE RENAL FAILURE AND SEPTIC SHOCK: Status: ACTIVE | Noted: 2025-01-01

## 2025-03-09 PROBLEM — N17.9 SEPSIS WITH ACUTE RENAL FAILURE AND SEPTIC SHOCK: Status: ACTIVE | Noted: 2025-01-01

## 2025-03-09 PROBLEM — D69.6 THROMBOCYTOPENIA: Status: ACTIVE | Noted: 2025-01-01

## 2025-03-09 NOTE — PROCEDURES
"Serina Barriga is a 50 y.o. female patient.    Temp: 97.7 °F (36.5 °C) (03/09/25 1811)  Pulse: 88 (03/09/25 1811)  Resp: 18 (03/09/25 1811)  BP: (!) 91/54 (03/09/25 1811)  SpO2: 100 % (03/09/25 1811)  Weight: 68 kg (150 lb) (03/09/25 1259)  Height: 5' 2" (157.5 cm) (03/09/25 1259)       Intubation    Date/Time: 3/9/2025 6:29 PM  Location procedure was performed: [facility] INTENSIVE CARE UNIT    Performed by: Liberty Rodriguez MD  Authorized by: Liberty Rodriguez MD  Consent Done: Emergent Situation  Indications: respiratory distress  Intubation method: direct  Patient status: sedated  Preoxygenation: nonrebreather mask  Sedatives: etomidate  Paralytic: vecuronium  Laryngoscope size: Glide 4  Tube size: 7.0 mm  Tube type: cuffed  Number of attempts: 1  Ventilation between attempts: BVM  Cricoid pressure: yes  Cords visualized: yes  Post-procedure assessment: ETCO2 monitor          3/9/2025    "

## 2025-03-09 NOTE — HPI
50 year old female, comorbid conditions include Breast Cancer, Transaminitis, hyperbilirubinemia. Presented to Oncology clinic with c/o nausea/vomiting, diarrhea. Patient s/p first cycle chemotherapy on 2/24 with docetaxel. Planned for anti-her2 therapy as OP. Patient has known lesions to liver, MRI liver on 2/24 did not show obstructive process.She was discharged recently on 3/7 with CINDI better and resolution of hypotension and electrolyte abn. Now with worsening mucositis, hypotension, epistaxis, mucosal bleeding and weakness. INR, Cr, BUN, Hb, Bicarbonate, LFTs and bilirubin all worse since the day of discharge 48 hrs ago. She is deeply jaundiced and coagulopathic.

## 2025-03-09 NOTE — ASSESSMENT & PLAN NOTE
This patient does not have evidence of infective focus  My overall impression is septic shock due to MAP < 65.  Source: Unknown  Antibiotics given-   Antibiotics (72h ago, onward)      None          Latest lactate reviewed-  Recent Labs   Lab 03/09/25  1333   LACTATE 1.3     Organ dysfunction indicated by Acute kidney injury and Acute liver injury    Fluid challenge Ideal Body Weight- The patient's ideal body weight is Ideal body weight: 50.1 kg (110 lb 7.2 oz) which will be used to calculate fluid bolus of 30 ml/kg for treatment of septic shock.      Post- resuscitation assessment Yes - I attest a sepsis perfusion exam was performed within 6 hours of sepsis, severe sepsis, or septic shock presentation, following fluid resuscitation.      Will Start Pressors- Levophed for MAP of 65  Source control achieved by: On broad spectrum Abx

## 2025-03-09 NOTE — ASSESSMENT & PLAN NOTE
CINDI is likely due to pre-renal azotemia due to intravascular volume depletion. Baseline creatinine is unknown. Most recent creatinine and eGFR are listed below.  Recent Labs     03/07/25  0446 03/09/25  1333   CREATININE 1.9* 5.3*   EGFRNORACEVR 32* 9*      Plan  - CINDI is worsening. Will continue current treatment  - Avoid nephrotoxins and renally dose meds for GFR listed above  - Monitor urine output, serial BMP, and adjust therapy as needed  - IV bicarbonate infusion @ 125  - CVL and CVP to guide therapy  - Echo in am

## 2025-03-09 NOTE — ED PROVIDER NOTES
SCRIBE #1 NOTE: I, Eliud Gamble, am scribing for, and in the presence of, Ean Dean MD. I have scribed the entire note.       History     Chief Complaint   Patient presents with    Epistaxis     Pt having complications from Chemo for breast and liver cancer. Pt has had multiple nose bleeds, bleeding from the gums, and blood in stool this AM. Pt is very jaundiced and having a generalized rash on body.      Review of patient's allergies indicates:   Allergen Reactions    Codeine Rash         History of Present Illness     HPI    3/9/2025, 2:26 PM  History obtained from the patient, medical records, and       History of Present Illness: Serina Barriga is a 50 y.o. female patient with a PMHx of HTN, depression, L breast cancer, liver metastasis, CINDI, and acute liver failure who presents to the Emergency Department for evaluation of Chemo Tx complications which began 2 weeks ago. 2 weeks ago, pt was diagnosed with liver metastasis. Pt had 1 unit of Chemo given on 3/3/25 at [facility] by Dr. Niyah Lopez (Hematology and Oncology). Pt was recently discharged from [facility] 3 days ago. Earlier today, pt was told by Dr. Lopez to come to ED due to a bleeding rash on her upper body that onset yesterday. Dr. Lopez is worried the rash may affect the pt's platelet levels. Associated sxs include jaundice, mouth ulcers, mouth pain, epistaxis, itchiness to rash, bleeding from her mouth, coughing up sputum and blood, and fatigue.  reports pt is also having difficulty eating and speaking due to her mouth ulcers. Symptoms are constant and severe in severity. No mitigating or exacerbating factors reported.   denies any confusion, myalgia, or fever from pt. Pt has no past hx of blood transfusions.   reports the only medication pt has taken at home has been Imodium.  reports pt tried Magic mouthwash and Nystatin but is having difficulty tolerating it.  reports the pt's  blood levels have been inconsistent since Chemo Tx. No further complaints or concerns at this time.       Arrival mode: Personal Transportation    PCP: Obed Alford MD        Past Medical History:  Past Medical History:   Diagnosis Date    Depression     controlled with meds    Fibroid December 2, 2022    Hypertension     It's controlled with meds.       Past Surgical History:  Past Surgical History:   Procedure Laterality Date    FUNCTIONAL ENDOSCOPIC SINUS SURGERY (FESS) USING COMPUTER-ASSISTED NAVIGATION  02/2022         Family History:  Family History   Problem Relation Name Age of Onset    Breast cancer Mother Maria Angelo 63        triple negative breast cancer  masectomy    Hypertension Mother Maria Angelo     Prostate cancer Father Rikki Angelo 70        prostectomy    Thyroid disease Maternal Aunt Sherry Lazarus     Lung cancer Maternal Grandmother          smoker    Skin cancer Paternal Grandfather          face    Other (HPV) Daughter  23    Breast cancer Other Fifi 60 - 69    Ovarian cancer Neg Hx      Colon cancer Neg Hx         Social History:  Social History     Tobacco Use    Smoking status: Never    Smokeless tobacco: Never   Substance and Sexual Activity    Alcohol use: No    Drug use: No    Sexual activity: Yes     Partners: Male     Birth control/protection: OCP        Review of Systems     Review of Systems   Constitutional:  Positive for appetite change and fatigue. Negative for fever.   HENT:  Positive for mouth sores (ulcers) and nosebleeds.         (+) mouth pain  (+) mouth bleeding   Respiratory:  Positive for cough (productive of blood and mucus). Negative for shortness of breath.    Cardiovascular:  Negative for chest pain.   Gastrointestinal:  Negative for nausea.   Genitourinary:  Negative for dysuria.   Musculoskeletal:  Negative for back pain and myalgias.   Skin:  Positive for color change (Appears yellow) and rash (to generalized upper body).        (+) Bleeding from  rashes  (+) itchiness to rash   Neurological:  Negative for weakness.   Hematological:  Does not bruise/bleed easily.   Psychiatric/Behavioral:  Negative for confusion.    All other systems reviewed and are negative.       Physical Exam     Initial Vitals [03/09/25 1259]   BP Pulse Resp Temp SpO2   (!) 83/41 89 18 98.3 °F (36.8 °C) 98 %      MAP       --          Physical Exam  Vitals and nursing note reviewed.   Constitutional:       General: She is not in acute distress.     Appearance: She is well-developed. She is ill-appearing.      Comments: Patient is lethargic   HENT:      Head: Normocephalic and atraumatic.      Comments: Patient blood in her mouth.  She has mucositis.  She has some dry blood in her nares.     Nose: No congestion or rhinorrhea.   Eyes:      General: Scleral icterus present.      Pupils: Pupils are equal, round, and reactive to light.   Cardiovascular:      Rate and Rhythm: Normal rate and regular rhythm.      Heart sounds: Normal heart sounds. No murmur heard.     No friction rub. No gallop.   Pulmonary:      Effort: No respiratory distress.      Breath sounds: Normal breath sounds. No stridor. No wheezing, rhonchi or rales.   Abdominal:      General: Bowel sounds are normal. There is no distension.      Palpations: Abdomen is soft.      Tenderness: There is no abdominal tenderness. There is no guarding or rebound.   Musculoskeletal:         General: No tenderness, deformity or signs of injury.      Cervical back: Normal range of motion and neck supple. No tenderness.      Comments: Patient has generalized weakness.  She is able to move and not paralyzed but very very weak   Skin:     Coloration: Skin is jaundiced.      Findings: Rash present.      Comments: Rash noted to upper body to the chest shoulder upper arms.  It does luciano.  There was no pustules.  It does not seem to be tender to touch.   Neurological:      Cranial Nerves: No cranial nerve deficit.      Sensory: No sensory  "deficit.      Motor: Weakness present.      Comments: Patient is able to state her name date of birth and time however she is very weak and lethargic   Psychiatric:      Comments: Flat affect           .     ED Course   Critical Care    Date/Time: 3/9/2025 3:11 PM    Performed by: Ean Dean MD  Authorized by: Ean Dean MD  Direct patient critical care time: 13 minutes  Additional history critical care time: 10 minutes  Ordering / reviewing critical care time: 5 minutes  Documentation critical care time: 8 minutes  Consulting other physicians critical care time: 4 minutes  Consult with family critical care time: 5 minutes  Total critical care time (exclusive of procedural time) : 45 minutes  Critical care time was exclusive of separately billable procedures and treating other patients and teaching time.  Critical care was necessary to treat or prevent imminent or life-threatening deterioration of the following conditions: renal failure and sepsis.  Critical care was time spent personally by me on the following activities: blood draw for specimens, development of treatment plan with patient or surrogate, discussions with consultants, interpretation of cardiac output measurements, evaluation of patient's response to treatment, examination of patient, obtaining history from patient or surrogate, ordering and review of laboratory studies, ordering and performing treatments and interventions, ordering and review of radiographic studies, pulse oximetry, re-evaluation of patient's condition and review of old charts.        ED Vital Signs:  Vitals:    03/09/25 1259 03/09/25 1315 03/09/25 1400 03/09/25 1445   BP: (!) 83/41 (!) 92/51 (!) 80/40 (!) 96/47   Pulse: 89 85 89 88   Resp: 18 19 (!) 22 (!) 21   Temp: 98.3 °F (36.8 °C)      TempSrc: Oral      SpO2: 98% 97% 95% 96%   Weight: 68 kg (150 lb)      Height: 5' 2" (1.575 m)       03/09/25 1500 03/09/25 1545 03/09/25 1554 03/09/25 1559   BP: (!) 80/41 (!) " 71/42  (S) (!) 78/45   Pulse: 85 87 82 85   Resp: 17 16 18 17   Temp:    98 °F (36.7 °C)   TempSrc:       SpO2: 95% 96% 95% 95%   Weight:       Height:        03/09/25 1638   BP: (!) 82/49   Pulse: 83   Resp: 17   Temp:    TempSrc:    SpO2: 96%   Weight:    Height:        Abnormal Lab Results:  Labs Reviewed   CBC W/ AUTO DIFFERENTIAL - Abnormal       Result Value    WBC 17.46 (*)     RBC 4.06      Hemoglobin 10.6 (*)     Hematocrit 30.7 (*)     MCV 76 (*)     MCH 26.1 (*)     MCHC 34.5      RDW 24.5 (*)     Platelets 67 (*)     MPV SEE COMMENT      Immature Granulocytes CANCELED      Immature Grans (Abs) CANCELED      Lymph # CANCELED      Mono # CANCELED      Eos # CANCELED      Baso # CANCELED      nRBC 5 (*)     Gran % 64.0      Lymph % 12.0 (*)     Mono % 5.0      Eosinophil % 0.0      Basophil % 0.0      Bands 5.0      Metamyelocytes 3.0      Myelocytes 11.0      Platelet Estimate Decreased (*)     Aniso Slight      Poik Slight      Poly Occasional      Target Cells Occasional      Schistocytes Present      Toxic Granulation Present      Differential Method Manual     COMPREHENSIVE METABOLIC PANEL - Abnormal    Sodium 132 (*)     Potassium 4.1      Chloride 109      CO2 9 (*)     Glucose 80       (*)     Creatinine 5.3 (*)     Calcium 6.8 (*)     Total Protein 4.4 (*)     Albumin 1.5 (*)     Total Bilirubin 25.4 (*)     Alkaline Phosphatase 841 (*)      (*)      (*)     eGFR 9 (*)     Anion Gap 14      Narrative:     CO2 CALCIUM critical result(s) called and verbal readback obtained   from ANA BOLAÑOS RN by SANTA 03/09/2025 14:23   URINALYSIS, REFLEX TO URINE CULTURE - Abnormal    Specimen UA Urine, Catheterized      Color, UA Yellow      Appearance, UA Hazy (*)     pH, UA 6.0      Specific Gravity, UA 1.015      Protein, UA 1+ (*)     Glucose, UA Trace (*)     Ketones, UA Negative      Bilirubin (UA) 3+ (*)     Occult Blood UA Trace (*)     Nitrite, UA Negative      Urobilinogen, UA  Negative      Leukocytes, UA Negative      Narrative:     Specimen Source->Urine   MAGNESIUM - Abnormal    Magnesium 2.8 (*)    PHOSPHORUS - Abnormal    Phosphorus 7.3 (*)    APTT - Abnormal    aPTT 148.1 (*)    PROTIME-INR - Abnormal    Prothrombin Time 47.5 (*)     INR 4.6 (*)    PROCALCITONIN - Abnormal    Procalcitonin 4.84 (*)    LIPASE - Abnormal    Lipase 253 (*)    AMMONIA - Abnormal    Ammonia 66 (*)    CULTURE, BLOOD   CULTURE, BLOOD   CLOSTRIDIUM DIFFICILE   LACTIC ACID, PLASMA    Lactate (Lactic Acid) 1.3     B-TYPE NATRIURETIC PEPTIDE    BNP 66     TROPONIN I    Troponin I 0.025     URINALYSIS MICROSCOPIC    RBC, UA 2      WBC, UA 3      Bacteria Rare      Hyaline Casts, UA 0      Microscopic Comment SEE COMMENT      Narrative:     Specimen Source->Urine   LACTIC ACID, PLASMA   TYPE & SCREEN    Group & Rh B POS      Indirect Monet NEG      Specimen Outdate 03/12/2025 23:59     PREPARE FRESH FROZEN PLASMA SOFT        All Lab Results:  Results for orders placed or performed during the hospital encounter of 03/09/25   CBC auto differential    Collection Time: 03/09/25  1:33 PM   Result Value Ref Range    WBC 17.46 (H) 3.90 - 12.70 K/uL    RBC 4.06 4.00 - 5.40 M/uL    Hemoglobin 10.6 (L) 12.0 - 16.0 g/dL    Hematocrit 30.7 (L) 37.0 - 48.5 %    MCV 76 (L) 82 - 98 fL    MCH 26.1 (L) 27.0 - 31.0 pg    MCHC 34.5 32.0 - 36.0 g/dL    RDW 24.5 (H) 11.5 - 14.5 %    Platelets 67 (L) 150 - 450 K/uL    MPV SEE COMMENT 9.2 - 12.9 fL    Immature Granulocytes CANCELED 0.0 - 0.5 %    Immature Grans (Abs) CANCELED 0.00 - 0.04 K/uL    Lymph # CANCELED 1.0 - 4.8 K/uL    Mono # CANCELED 0.3 - 1.0 K/uL    Eos # CANCELED 0.0 - 0.5 K/uL    Baso # CANCELED 0.00 - 0.20 K/uL    nRBC 5 (A) 0 /100 WBC    Gran % 64.0 38.0 - 73.0 %    Lymph % 12.0 (L) 18.0 - 48.0 %    Mono % 5.0 4.0 - 15.0 %    Eosinophil % 0.0 0.0 - 8.0 %    Basophil % 0.0 0.0 - 1.9 %    Bands 5.0 %    Metamyelocytes 3.0 %    Myelocytes 11.0 %    Platelet Estimate  Decreased (A)     Aniso Slight     Poik Slight     Poly Occasional     Target Cells Occasional     Schistocytes Present     Toxic Granulation Present     Differential Method Manual    Comprehensive metabolic panel    Collection Time: 03/09/25  1:33 PM   Result Value Ref Range    Sodium 132 (L) 136 - 145 mmol/L    Potassium 4.1 3.5 - 5.1 mmol/L    Chloride 109 95 - 110 mmol/L    CO2 9 (LL) 23 - 29 mmol/L    Glucose 80 70 - 110 mg/dL     (H) 6 - 20 mg/dL    Creatinine 5.3 (H) 0.5 - 1.4 mg/dL    Calcium 6.8 (LL) 8.7 - 10.5 mg/dL    Total Protein 4.4 (L) 6.0 - 8.4 g/dL    Albumin 1.5 (L) 3.5 - 5.2 g/dL    Total Bilirubin 25.4 (H) 0.1 - 1.0 mg/dL    Alkaline Phosphatase 841 (H) 40 - 150 U/L     (H) 10 - 40 U/L     (H) 10 - 44 U/L    eGFR 9 (A) >60 mL/min/1.73 m^2    Anion Gap 14 8 - 16 mmol/L   Lactic acid, plasma #1    Collection Time: 03/09/25  1:33 PM   Result Value Ref Range    Lactate (Lactic Acid) 1.3 0.5 - 2.2 mmol/L   Magnesium    Collection Time: 03/09/25  1:33 PM   Result Value Ref Range    Magnesium 2.8 (H) 1.6 - 2.6 mg/dL   Phosphorus    Collection Time: 03/09/25  1:33 PM   Result Value Ref Range    Phosphorus 7.3 (H) 2.7 - 4.5 mg/dL   APTT    Collection Time: 03/09/25  1:33 PM   Result Value Ref Range    aPTT 148.1 (H) 21.0 - 32.0 sec   Protime-INR    Collection Time: 03/09/25  1:33 PM   Result Value Ref Range    Prothrombin Time 47.5 (H) 9.0 - 12.5 sec    INR 4.6 (H) 0.8 - 1.2   Brain natriuretic peptide    Collection Time: 03/09/25  1:33 PM   Result Value Ref Range    BNP 66 0 - 99 pg/mL   Troponin I    Collection Time: 03/09/25  1:33 PM   Result Value Ref Range    Troponin I 0.025 0.000 - 0.026 ng/mL   Procalcitonin    Collection Time: 03/09/25  1:33 PM   Result Value Ref Range    Procalcitonin 4.84 (H) <0.25 ng/mL   Lipase    Collection Time: 03/09/25  1:33 PM   Result Value Ref Range    Lipase 253 (H) 4 - 60 U/L   Type & Screen    Collection Time: 03/09/25  1:33 PM   Result Value  Ref Range    Group & Rh B POS     Indirect Monet NEG     Specimen Outdate 03/12/2025 23:59    Ammonia    Collection Time: 03/09/25  1:41 PM   Result Value Ref Range    Ammonia 66 (H) 10 - 50 umol/L   Urinalysis, Reflex to Urine Culture Urine, Catheterized    Collection Time: 03/09/25  2:07 PM    Specimen: Urine   Result Value Ref Range    Specimen UA Urine, Catheterized     Color, UA Yellow Yellow, Straw, Bianca    Appearance, UA Hazy (A) Clear    pH, UA 6.0 5.0 - 8.0    Specific Gravity, UA 1.015 1.005 - 1.030    Protein, UA 1+ (A) Negative    Glucose, UA Trace (A) Negative    Ketones, UA Negative Negative    Bilirubin (UA) 3+ (A) Negative    Occult Blood UA Trace (A) Negative    Nitrite, UA Negative Negative    Urobilinogen, UA Negative <2.0 EU/dL    Leukocytes, UA Negative Negative   Urinalysis Microscopic    Collection Time: 03/09/25  2:07 PM   Result Value Ref Range    RBC, UA 2 0 - 4 /hpf    WBC, UA 3 0 - 5 /hpf    Bacteria Rare None-Occ /hpf    Hyaline Casts, UA 0 0-1/lpf /lpf    Microscopic Comment SEE COMMENT        Imaging Results:  Imaging Results              X-Ray Chest AP Portable (Final result)  Result time 03/09/25 13:29:42      Final result by Slava Simmons MD (03/09/25 13:29:42)                   Impression:      No acute findings.    Finalized on: 3/9/2025 1:29 PM By:  Slava Simmons MD  [facility]# [id number]      2025-03-09 13:31:[id number]     [facility]               Narrative:    EXAM:  XR CHEST AP PORTABLE    CLINICAL HISTORY: Sepsis;    COMPARISON STUDIES: 03/04/2025    FINDINGS:  The heart size is normal.  The mediastinal silhouette is within normal limits.  Right chest port unchanged in positioning.    The lungs are clear. No pleural effusion.  Mild elevation right hemidiaphragm.    No acute osseous findings.  Age appropriate or no arthritic change.                                         The EKG was ordered, reviewed, and independently interpreted by the ED provider.  Interpretation time: 13:37  Rate:  86 BPM  Rhythm: normal sinus rhythm  Interpretation: Cannot rule out Anterior infarct, age undetermined. No STEMI.         The Emergency Provider reviewed the vital signs and test results, which are outlined above.     ED Discussion     1:26 PM: A focused exam (Vital Signs Reviewed, Cardiopulmonary Exam, Capillary Refill Evaluation, Peripheral Pulse Evaluation and Skin Examination) performed.     3:12 PM: Discussed pt's case with Dr. Lopez (Hematology and Oncology) who agrees with admission.  Patient is very dehydrated with bleeding elevated coags and liver enzymes.  She is a white count is 17 and cefepime has been ordered.  30 cc/kg of normal saline has been ordered initially.      3:54 PM: Discussed pt's case with Evon Rasmussen NP (Pulmonary Medicine) who recommends bicarb drip and starting on Levophed if the pt does not respond to fluids. She also states Critical Care can take pt if she neets a central line and pressors.  After the 30 cc/kg she got another L and a half of IV fluids.    3:54 PM: Discussed pt's case with Nela Mckeon NP (Hospital Medicine) who recommends albumin.  We will also plan to start Levophed drip.  And plan for the patient to go to the ICU.  Went over care with the patient in the .  Elevated BUN creatinine she has not really ate or drank anything for since being discharged from the hospital.    4:25 PM: Discussed case with Evon Rasmussen NP (Pulmonary Medicine). Dr. Rodriguez agrees with current care and management of pt and accepts admission.   Admitting Service: Pulmonary Medicine  Admitting Physician: Dr. Rodriguez  Admit to:  ICU    4:25 PM: Re-evaluated pt.  I have discussed test results, shared treatment plan, and the need for admission with patient/family/caretaker at bedside. Pt and/or family/caretaker express understanding at this time and agree with all information. All questions answered. Pt/caretaker/family member(s) have no further questions or concerns at this  time. Pt is ready for admit.     Medical Decision Making  Differential diagnoses: Pneumonia, UTI, intra-abdominal pathology, meningitis, encephalitis, cellulitis, COVID, influenza, Bacteremia, allergic reaction to medication     Amount and/or Complexity of Data Reviewed  Labs: ordered. Decision-making details documented in ED Course.  Radiology: ordered. Decision-making details documented in ED Course.  ECG/medicine tests: ordered and independent interpretation performed. Decision-making details documented in ED Course.    Risk  OTC drugs.  Prescription drug management.  Decision regarding hospitalization.    Critical Care  Total time providing critical care: 45 minutes                ED Medication(s):  Medications   magic mouthwash (diphenhydrAMINE 12.5 mg/5 mL 20 mL, aluminum & magnesium hydroxide-simethicone (MYLANTA) 20 mL, LIDOcaine HCl 2% (XYLOCAINE) 20 mL) solution (15 mLs Swish & Spit Given 3/9/25 1438)   NORepinephrine 4 mg in sodium chloride 0.9% 250 mL infusion (premix) (has no administration in time range)   sodium chloride 0.9% flush 10 mL (has no administration in time range)   famotidine (PF) injection 20 mg (has no administration in time range)   sodium bicarbonate 150 mEq in D5W 1,150 mL infusion (has no administration in time range)   phytonadione vitamin k (AQUA-MEPHYTON) 1 mg in D5W 50 mL IVPB (has no administration in time range)   0.9%  NaCl infusion (for blood administration) (has no administration in time range)   oxymetazoline 0.05 % nasal spray 2 spray (2 sprays Each Nostril Given 3/9/25 1422)   sodium chloride 0.9% bolus 1,503 mL 1,503 mL (0 mLs Intravenous Stopped 3/9/25 1454)   ceFEPIme injection 2 g (2 g Intravenous Given 3/9/25 1448)   sodium chloride 0.9% bolus 1,500 mL 1,500 mL (0 mLs Intravenous Stopped 3/9/25 1637)       New Prescriptions    No medications on file               Scribe Attestation:   Scribe #1: I performed the above scribed service and the documentation accurately  describes the services I performed. I attest to the accuracy of the note.     Attending:   Physician Attestation Statement for Scribe #1: I, Ean Dean MD, personally performed the services described in this documentation, as scribed by Eliud Gamble, in my presence, and it is both accurate and complete.           Clinical Impression       ICD-10-CM ICD-9-CM   1. Sepsis, due to unspecified organism, unspecified whether acute organ dysfunction present  A41.9 038.9     995.91   2. Screening for cardiovascular condition  Z13.6 V81.2   3. Acute renal failure, unspecified acute renal failure type  N17.9 584.9   4. Rash  R21 782.1   5. Epistaxis  R04.0 784.7   6. Mucositis due to chemotherapy  K12.31 528.01     E933.1       Disposition:   Disposition: Admitted  Condition: Serious        Ean Dean MD  03/09/25 3903

## 2025-03-09 NOTE — PROCEDURES
"Serina Barriga is a 50 y.o. female patient.    Temp: 97.7 °F (36.5 °C) (03/09/25 1811)  Pulse: 98 (03/09/25 1845)  Resp: 20 (03/09/25 1845)  BP: (!) 109/55 (03/09/25 1845)  SpO2: 100 % (03/09/25 1845)  Weight: 68 kg (150 lb) (03/09/25 1259)  Height: 5' 2" (157.5 cm) (03/09/25 1259)       Central Line    Date/Time: 3/9/2025 6:59 PM    Performed by: Evon Rasmussen NP  Authorized by: Evon Rasmussen NP    Location procedure was performed:  [facility] PULMON[facility]  Pre-operative diagnosis:  Sepsis  Post-operative diagnosis:  Sepsis  Consent Done ?:  Yes  Time out complete?: Verified correct patient, procedure, equipment, staff, and site/side    Indications:  Med administration and hemodynamic monitoring  Preparation:  Skin prepped with ChloraPrep  Skin prep agent dried: Skin prep agent completely dried prior to procedure    Sterile barriers: All five maximal sterile barriers used - gloves, gown, cap, mask and large sterile sheet    Hand hygiene: Hand hygiene performed immediately prior to central venous catheter insertion    Location:  Right internal jugular  Catheter type:  Triple lumen  Catheter size:  7 Fr  Inserted Catheter Length (cm):  16  Ultrasound guidance: Yes    Vessel Caliber:  Small  Needle advanced into vessel with real time ultrasound guidance.    Guidewire confirmed in vessel.    Steril sheath on probe.    Sterile gel used.  Manometry: Yes    Number of attempts:  1  Securement:  Line sutured, chlorhexidine patch, sterile dressing applied and blood return through all ports  Complications: No    Specimens: No    Implants: No    Guidewire: guidewire removed intact, verified with nurse    XRay:  Placement verified by x-ray  Adverse Events:  NoneTermination Site: superior vena cava      3/9/2025    "

## 2025-03-09 NOTE — H&P
[facility] - Emergency Dept.  Critical Care Medicine  History & Physical    Patient Name: Serina Barriga  MRN: [id number]  Admission Date: 3/9/2025  Hospital Length of Stay: 0 days  Code Status: Full Code  Attending Physician: Liberty Rodriguez MD   Primary Care Provider: Obed Alford MD   Principal Problem: <principal problem not specified>    Subjective:     HPI:  50 year old female, comorbid conditions include Breast Cancer, Transaminitis, hyperbilirubinemia. Presented to Oncology clinic with c/o nausea/vomiting, diarrhea. Patient s/p first cycle chemotherapy on 2/24 with docetaxel. Planned for anti-her2 therapy as OP. Patient has known lesions to liver, MRI liver on 2/24 did not show obstructive process.She was discharged recently on 3/7 with CINDI better and resolution of hypotension and electrolyte abn. Now with worsening mucositis, hypotension, epistaxis, mucosal bleeding and weakness.   INR, Cr, BUN, Hb, Bicarbonate, LFTs and bilirubin all worse since the day of discharge 48 hrs ago. She is deeply jaundiced and coagulopathic.    Hospital/ICU Course:  No notes on file     Past Medical History:   Diagnosis Date    Depression     controlled with meds    Fibroid December 2, 2022    Hypertension     It's controlled with meds.       Past Surgical History:   Procedure Laterality Date    FUNCTIONAL ENDOSCOPIC SINUS SURGERY (FESS) USING COMPUTER-ASSISTED NAVIGATION  02/2022       Review of patient's allergies indicates:   Allergen Reactions    Codeine Rash       Family History       Problem Relation (Age of Onset)    Breast cancer Mother (63), Other (60 - 69)    HPV Daughter (23)    Hypertension Mother    Lung cancer Maternal Grandmother    Prostate cancer Father (70)    Skin cancer Paternal Grandfather    Thyroid disease Maternal Aunt          Tobacco Use    Smoking status: Never    Smokeless tobacco: Never   Substance and Sexual Activity    Alcohol use: No    Drug use: No    Sexual activity: Yes     Partners:  Male     Birth control/protection: OCP         Review of Systems   Constitutional:  Positive for fatigue. Negative for chills, diaphoresis, fever and unexpected weight change.   HENT:  Positive for nosebleeds, postnasal drip, trouble swallowing and voice change.    Eyes: Negative.    Respiratory:  Positive for cough and shortness of breath. Negative for apnea, choking, chest tightness, wheezing and stridor.    Cardiovascular: Negative.    Gastrointestinal: Negative.    Genitourinary: Negative.    Neurological: Negative.      Objective:     Vital Signs (Most Recent):  Temp: 98 °F (36.7 °C) (03/09/25 1559)  Pulse: 83 (03/09/25 1638)  Resp: 17 (03/09/25 1638)  BP: (!) 82/49 (03/09/25 1638)  SpO2: 96 % (03/09/25 1638) Vital Signs (24h Range):  Temp:  [98 °F (36.7 °C)-98.3 °F (36.8 °C)] 98 °F (36.7 °C)  Pulse:  [82-89] 83  Resp:  [16-22] 17  SpO2:  [95 %-98 %] 96 %  BP: (71-96)/(40-51) 82/49     Weight: 68 kg (150 lb)  Body mass index is 27.44 kg/m².      Intake/Output Summary (Last 24 hours) at 3/9/2025 1647  Last data filed at 3/9/2025 1454  Gross per 24 hour   Intake 1503 ml   Output --   Net 1503 ml        Physical Exam  Vitals and nursing note reviewed.   Constitutional:       General: She is in acute distress.      Appearance: She is ill-appearing. She is not toxic-appearing or diaphoretic.   Eyes:      General: Scleral icterus present.      Pupils: Pupils are equal, round, and reactive to light.   Cardiovascular:      Rate and Rhythm: Tachycardia present.   Pulmonary:      Effort: No respiratory distress.      Breath sounds: Rhonchi present.   Abdominal:      Palpations: Abdomen is soft.   Neurological:      General: No focal deficit present.      Mental Status: She is alert.          Vents:       Lines/Drains/Airways       Central Venous Catheter Line  Duration                  PowerPort A Cath Single Lumen 02/26/25 1050 Internal Jugular Right 11 days              Peripheral Intravenous Line  Duration                   Peripheral IV - Single Lumen 03/09/25 1330 20 G Anterior;Distal;Right Upper Arm <1 day         Peripheral IV - Single Lumen 03/09/25 1335 18 G 1 3/4 in Anterior;Left Upper Arm <1 day                    Significant Labs:    CBC/Anemia Profile:  Recent Labs   Lab 03/09/25  1333   WBC 17.46*   HGB 10.6*   HCT 30.7*   PLT 67*   MCV 76*   RDW 24.5*        Chemistries:  Recent Labs   Lab 03/09/25  1333   *   K 4.1      CO2 9*   *   CREATININE 5.3*   CALCIUM 6.8*   ALBUMIN 1.5*   PROT 4.4*   BILITOT 25.4*   ALKPHOS 841*   *   *   MG 2.8*   PHOS 7.3*       All pertinent labs within the past 24 hours have been reviewed.    Significant Imaging:   I have reviewed all pertinent imaging results/findings within the past 24 hours.  Assessment/Plan:     ENT  Epistaxis  S coagulopathy    Renal/  CINDI (acute kidney injury)  CINDI is likely due to pre-renal azotemia due to intravascular volume depletion. Baseline creatinine is unknown. Most recent creatinine and eGFR are listed below.  Recent Labs     03/07/25  0446 03/09/25  1333   CREATININE 1.9* 5.3*   EGFRNORACEVR 32* 9*      Plan  - CINDI is worsening. Will continue current treatment  - Avoid nephrotoxins and renally dose meds for GFR listed above  - Monitor urine output, serial BMP, and adjust therapy as needed  - IV bicarbonate infusion @ 125  - CVL and CVP to guide therapy  - Echo in am    ID  Sepsis with acute renal failure and septic shock  This patient does not have evidence of infective focus  My overall impression is septic shock due to MAP < 65.  Source: Unknown  Antibiotics given-   Antibiotics (72h ago, onward)      None          Latest lactate reviewed-  Recent Labs   Lab 03/09/25  1333   LACTATE 1.3     Organ dysfunction indicated by Acute kidney injury and Acute liver injury    Fluid challenge Ideal Body Weight- The patient's ideal body weight is Ideal body weight: 50.1 kg (110 lb 7.2 oz) which will be used to calculate fluid bolus  of 30 ml/kg for treatment of septic shock.      Post- resuscitation assessment Yes - I attest a sepsis perfusion exam was performed within 6 hours of sepsis, severe sepsis, or septic shock presentation, following fluid resuscitation.      Will Start Pressors- Levophed for MAP of 65  Source control achieved by: On broad spectrum Abx      Hematology  Thrombocytopenia  The likely etiology of thrombocytopenia is liver disease. The patients 3 most recent labs are listed below.  Recent Labs     03/07/25  0446 03/09/25  1333   * 67*     Plan  - Will transfuse if platelet count is <50k (if undergoing surgical procedure or have active bleeding).        Oncology  Malignant neoplasm of overlapping sites of left breast in female, estrogen receptor negative  Per Onc.  See Rx regimen    GI  Acute liver failure without hepatic coma  Patient is likely to have ischemic hepatitis as evidenced by abnormalities in AST, ALT, T. BILI, ALBUMIN, and INR. Etiology includes  metastatic disease . Continue to monitor liver function while treating underlying condition. Daily labs to include CMP, Liver Function Panel, and PT/INR.    AST   Date Value Ref Range Status   03/09/2025 716 (H) 10 - 40 U/L Final     ALT   Date Value Ref Range Status   03/09/2025 157 (H) 10 - 44 U/L Final     Hepatology consult in am   FFP to reverse coagulopathy / possible GI bleed / Mucositis and oral / nasal bleeding.      Transaminitis  Liver Mets    Palliative Care  Goals of care, counseling/discussion       D/W PATIENT IN THE PRESENCE OF HER  AND SHE WANTS ALL AGGRESSIVE CARE DONE AT THIS TIME AND THEN DIFFER FURTHER DECISIONS TO HER  IF SHE IS UNABLE TO PARTICIPATE.     Critical Care Time: 55 minutes  Critical secondary to Patient has a condition that poses threat to life and bodily function: Acute Renal Failure    Critical care was time spent personally by me on the following activities: development of treatment plan with patient or surrogate  and bedside caregivers, discussions with consultants, evaluation of patient's response to treatment, examination of patient, ordering and performing treatments and interventions, ordering and review of laboratory studies, ordering and review of radiographic studies, pulse oximetry, re-evaluation of patient's condition. This critical care time did not overlap with that of any other provider or involve time for any procedures.     Liberty Rodriguez MD  Critical Care Medicine  [facility] - Emergency Dept.

## 2025-03-09 NOTE — ASSESSMENT & PLAN NOTE
D/W PATIENT IN THE PRESENCE OF HER  AND SHE WANTS ALL AGGRESSIVE CARE DONE AT THIS TIME AND THEN DIFFER FURTHER DECISIONS TO HER  IF SHE IS UNABLE TO PARTICIPATE.

## 2025-03-09 NOTE — PHARMACY MED REC
"Admission Medication History     The home medication history was taken by Siobhan Murphy.    You may go to "Admission" then "Reconcile Home Medications" tabs to review and/or act upon these items.     The home medication list has been updated by the Pharmacy department.   Please read ALL comments highlighted in yellow.   Please address this information as you see fit.    Feel free to contact us if you have any questions or require assistance.      The medications listed below were removed from the home medication list. Please reorder if appropriate:  Patient reports no longer taking the following medication(s):  Magic mouthwash 1:1:1:   ROBIN cream  Imodium 2 mg  Nystatin 100,000 unit/mL suspension  Tramadol 50 mg    Medications listed below were obtained from: Patient/family and Analytic software- [employer]      [facility] REC COMPLETED:   Siobhan Murphy  [id number]    Current Outpatient Medications on File Prior to Encounter   Medication Sig Dispense Refill Last Dose/Taking    diphenoxylate-atropine 2.5-0.025 mg (LOMOTIL) 2.5-0.025 mg per tablet Take 1 tablet by mouth 4 (four) times daily as needed for Diarrhea. 30 tablet 1 3/9/2025    OLANZapine (ZYPREXA) 5 MG tablet Take 1 tablet (5 mg total) by mouth every evening. 30 tablet 3 Past Week    ondansetron (ZOFRAN-ODT) 4 MG TbDL Take 4 mg by mouth every 8 (eight) hours as needed.   Past Week    sertraline (ZOLOFT) 50 MG tablet Take 50 mg by mouth once daily.   Past Week    traMADoL (ULTRAM) 50 mg tablet Take 1 tablet (50 mg total) by mouth every 12 (twelve) hours as needed for Pain. 30 tablet 0 Past Week                           .          "

## 2025-03-09 NOTE — SUBJECTIVE & OBJECTIVE
Past Medical History:   Diagnosis Date    Depression     controlled with meds    Fibroid December 2, 2022    Hypertension     It's controlled with meds.       Past Surgical History:   Procedure Laterality Date    FUNCTIONAL ENDOSCOPIC SINUS SURGERY (FESS) USING COMPUTER-ASSISTED NAVIGATION  02/2022       Review of patient's allergies indicates:   Allergen Reactions    Codeine Rash       Family History       Problem Relation (Age of Onset)    Breast cancer Mother (63), Other (60 - 69)    HPV Daughter (23)    Hypertension Mother    Lung cancer Maternal Grandmother    Prostate cancer Father (70)    Skin cancer Paternal Grandfather    Thyroid disease Maternal Aunt          Tobacco Use    Smoking status: Never    Smokeless tobacco: Never   Substance and Sexual Activity    Alcohol use: No    Drug use: No    Sexual activity: Yes     Partners: Male     Birth control/protection: OCP         Review of Systems   Constitutional:  Positive for fatigue. Negative for chills, diaphoresis, fever and unexpected weight change.   HENT:  Positive for nosebleeds, postnasal drip, trouble swallowing and voice change.    Eyes: Negative.    Respiratory:  Positive for cough and shortness of breath. Negative for apnea, choking, chest tightness, wheezing and stridor.    Cardiovascular: Negative.    Gastrointestinal: Negative.    Genitourinary: Negative.    Neurological: Negative.      Objective:     Vital Signs (Most Recent):  Temp: 98 °F (36.7 °C) (03/09/25 1559)  Pulse: 83 (03/09/25 1638)  Resp: 17 (03/09/25 1638)  BP: (!) 82/49 (03/09/25 1638)  SpO2: 96 % (03/09/25 1638) Vital Signs (24h Range):  Temp:  [98 °F (36.7 °C)-98.3 °F (36.8 °C)] 98 °F (36.7 °C)  Pulse:  [82-89] 83  Resp:  [16-22] 17  SpO2:  [95 %-98 %] 96 %  BP: (71-96)/(40-51) 82/49     Weight: 68 kg (150 lb)  Body mass index is 27.44 kg/m².      Intake/Output Summary (Last 24 hours) at 3/9/2025 1647  Last data filed at 3/9/2025 1454  Gross per 24 hour   Intake 1503 ml   Output --    Net 1503 ml        Physical Exam  Vitals and nursing note reviewed.   Constitutional:       General: She is in acute distress.      Appearance: She is ill-appearing. She is not toxic-appearing or diaphoretic.   Eyes:      General: Scleral icterus present.      Pupils: Pupils are equal, round, and reactive to light.   Cardiovascular:      Rate and Rhythm: Tachycardia present.   Pulmonary:      Effort: No respiratory distress.      Breath sounds: Rhonchi present.   Abdominal:      Palpations: Abdomen is soft.   Neurological:      General: No focal deficit present.      Mental Status: She is alert.          Vents:       Lines/Drains/Airways       Central Venous Catheter Line  Duration                  PowerPort A Cath Single Lumen 02/26/25 1050 Internal Jugular Right 11 days              Peripheral Intravenous Line  Duration                  Peripheral IV - Single Lumen 03/09/25 1330 20 G Anterior;Distal;Right Upper Arm <1 day         Peripheral IV - Single Lumen 03/09/25 1335 18 G 1 3/4 in Anterior;Left Upper Arm <1 day                    Significant Labs:    CBC/Anemia Profile:  Recent Labs   Lab 03/09/25  1333   WBC 17.46*   HGB 10.6*   HCT 30.7*   PLT 67*   MCV 76*   RDW 24.5*        Chemistries:  Recent Labs   Lab 03/09/25  1333   *   K 4.1      CO2 9*   *   CREATININE 5.3*   CALCIUM 6.8*   ALBUMIN 1.5*   PROT 4.4*   BILITOT 25.4*   ALKPHOS 841*   *   *   MG 2.8*   PHOS 7.3*       All pertinent labs within the past 24 hours have been reviewed.    Significant Imaging:   I have reviewed all pertinent imaging results/findings within the past 24 hours.

## 2025-03-09 NOTE — PROCEDURES
"[facility] - Intensive Care ([facility])  Bronchoscopy   Procedure Note    SUMMARY     Date of Procedure: 3/9/2025    Procedure: Bronchoalveolar lavage, BAL    Provider: Liberty Rodriguez MD    Pre-Procedure Diagnosis: hemoptysis    Indication: hemoptysis    Anesthesia: Moderate Sedation        Description of the Findings of the Procedure:     Patient was consented for the procedure with all risk and benefit of the procedure explained in detail.  Patient was given the opportunity to ask questions and all concerns were answered.  The bronchocope was inserted into the main airway via the endotracheal tube. An anatomical survey was done of the main airways and the subsegmental bronchus.  The findings are reported above.  A bronchialalveolar lavage was performed using aliquots of normal saline instilled into the airways then aspirated back.      Complications:  none    Estimated Blood Loss (EBL): Minimal           Specimens: BAL RML / RLL       Condition: Stable        Disposition: ICU - intubated and critically ill.    Attestation: I was present and scrubbed for the entire procedure.  Serina Barriga is a 50 y.o. female patient.    Temp: 97.7 °F (36.5 °C) (03/09/25 1811)  Pulse: 87 (03/09/25 1822)  Resp: 20 (03/09/25 1822)  BP: (!) 91/54 (03/09/25 1811)  SpO2: 100 % (03/09/25 1822)  Weight: 68 kg (150 lb) (03/09/25 1259)  Height: 5' 2" (157.5 cm) (03/09/25 1259)       Procedures    3/9/2025    "

## 2025-03-09 NOTE — TELEPHONE ENCOUNTER
Reason for Disposition   Bloody crusts on lips or sores in mouth   Patient sounds very sick or weak to the triager     See notes    Additional Information   Negative: [1] Life-threatening reaction (anaphylaxis) in the past to similar substance (e.g., food, insect bite/sting, chemical, etc.) AND [2] < 2 hours since exposure   Negative: [1] Sudden onset of rash (within last 2 hours) AND [2] difficulty breathing or swallowing   Negative: Shock suspected (e.g., cold/pale/clammy skin, too weak to stand, low BP, rapid pulse)   Negative: Difficult to awaken or acting confused (e.g., disoriented, slurred speech)   Negative: [1] Purple or blood-colored spots or dots AND [2] fever   Negative: Sounds like a life-threatening emergency to the triager   Negative: [1] Bright red, sunburn-like rash AND [2] current tampon use or nasal packing   Negative: [1] Bright red, sunburn-like rash AND [3] wound infection or recent surgery   Negative: [1] Bright red skin AND [2] peels off in sheets   Negative: Stiff neck (can't touch chin to chest)   Negative: Fever   Negative: Joint pain or swelling   Negative: Patient sounds very sick or weak to the triager   Negative: [1] Purple or blood-colored rash (spots or dots) AND [2] no fever AND [3] sounds well to triager   Negative: Large or small blisters on skin (i.e., fluid filled bubbles or sacs)   Negative: Fainted or too weak to stand following large blood loss   Negative: Sounds like a life-threatening emergency to the triager   Negative: [1] Bleeding present > 30 minutes AND [2] using correct method of direct pressure   Negative: [1] Bleeding now AND [2] second call after being instructed in correct technique of direct pressure   Negative: Feeling weak or lightheaded (e.g., woozy, feeling like they might faint)     Same as hospital   Negative: Pale skin (pallor) of new-onset or getting worse   Negative: [1] Has nasal packing (inserted by health care provider to control bleeding) AND [2] new  rash   Negative: [1] Has nasal packing AND [2] now bleeding around the packing  (Exception: Few drops or ooze.)    Protocols used: Rash or Redness - Widespread-A-AH, Nosebleed-A-AH  Pt's spouse states she received chemo two weeks ago. States she was discharged from the hospital Friday and has new onset rashes (purple) to the upper body and a nose bleed last night. Spouse wants to know if he needs to bring her back to the hospital or if she can keep her office appointment tomorrow. Spoke with pt's Oncologist Dr. Lopez and she requested to speak with the spouse. Advised spouse to call OOC back if pt becomes worse. Warm transferred the MD to the spouse. This nurse left the call after the transfer.

## 2025-03-09 NOTE — ASSESSMENT & PLAN NOTE
Patient is likely to have ischemic hepatitis as evidenced by abnormalities in AST, ALT, T. BILI, ALBUMIN, and INR. Etiology includes  metastatic disease . Continue to monitor liver function while treating underlying condition. Daily labs to include CMP, Liver Function Panel, and PT/INR.    AST   Date Value Ref Range Status   03/09/2025 716 (H) 10 - 40 U/L Final     ALT   Date Value Ref Range Status   03/09/2025 157 (H) 10 - 44 U/L Final     Hepatology consult in am   FFP to reverse coagulopathy / possible GI bleed / Mucositis and oral / nasal bleeding.

## 2025-03-09 NOTE — ASSESSMENT & PLAN NOTE
The likely etiology of thrombocytopenia is liver disease. The patients 3 most recent labs are listed below.  Recent Labs     03/07/25  0446 03/09/25  1333   * 67*     Plan  - Will transfuse if platelet count is <50k (if undergoing surgical procedure or have active bleeding).

## 2025-03-10 PROBLEM — Z51.5 COMFORT MEASURES ONLY STATUS: Status: ACTIVE | Noted: 2025-01-01

## 2025-03-10 NOTE — PROGRESS NOTES
Family conference held with this RN and Guillermo WARE, after discussing between themselves family has elected to make the pt comfort care at this time.  Per PA the ETT and sedation will remain and to turn off BP support.  Family at bedside now, will turn off medications when family is ready.  Emotional support provided, all questions encouraged and answered.

## 2025-03-10 NOTE — ACP (ADVANCE CARE PLANNING)
Advance Care Planning     Date: 03/10/2025    Code Status  In light of the patients advanced and life limiting illness,I engaged the the family in a voluntary conversation about the patient's preferences for care  at the very end of life. The patient wishes to have a natural, peaceful death.  Along those lines, the patient does not wish to have CPR or other invasive treatments performed when her heart and/or breathing stops. I communicated to the family that a DNR order would be placed in her medical record to reflect this preference.    A total of 25 min was spent on advance care planning, goals of care discussion, emotional support, formulating and communicating prognosis and exploring burden/benefit of various approaches of treatment. This discussion occurred on a fully voluntary basis with the verbal consent of the patient and/or family.

## 2025-03-10 NOTE — ASSESSMENT & PLAN NOTE
- Patient is likely to have ischemic hepatitis as evidenced by abnormalities in AST, ALT, T. BILI, ALBUMIN, and INR. Etiology includes  metastatic disease . Continue to monitor liver function while treating underlying condition. Daily labs to include CMP, Liver Function Panel, and PT/INR.    AST   Date Value Ref Range Status   03/10/2025 628 (H) 10 - 40 U/L Final     ALT   Date Value Ref Range Status   03/10/2025 136 (H) 10 - 44 U/L Final   - Hepatology has been consulted   - Continue to monitor LFT trends and avoid hepatotoxic agents  - FFP to reverse coagulopathy / possible GI bleed / Mucositis and oral / nasal bleeding.

## 2025-03-10 NOTE — PLAN OF CARE
Nutrition Recommendations/Interventions 3/10/25:   1. When medically appropriate, initiate pt onto continuous Enteral nutrition, recommend Peptamen 1.5 w/ Prebio via NG/OG tube at goal rate 30 mL/hr, starting at 10 mL/hr then progress to goal rate within 24 hrs if pt is tolerating and per MD/NP   -160 mL q4h free water flushes (960 mL/day), per MD/NP   -Check Mg, K+, Na, Phos and Glu before and during initiation, correct as indicated   2. Recommend 500 mg/day vitamin C for wound healing   3. Weigh twice weekly  4. Collaboration by nutrition professional with other providers     Goals:   1. Pt will be initiated onto continuous EN within 24 hrs   2. Pt will tolerate and intake >75% EEN and EPN prior to RD follow up   3. Pt will receive vitamin C supplement prior to RD follow up    GAYLA Hernandez, RDN, LDN

## 2025-03-10 NOTE — CHAPLAIN
Initial visit with patient and family.  Visited with patient and family to assess for spiritual and emotional needs.  Pt was unable to visit but I did spend time with her spouse and other family members.  They were all struggling with pt's condition and were taking time to process the situation.  Pt's family asked for prayer and I took time to do this before leaving.  Spiritual care remains available as needed.    Chaplain Edison Merlos M.Div., B[facility]

## 2025-03-10 NOTE — PROGRESS NOTES
1738- family stated they are ready for the gtts to be turned off. , dad and a daughter at the bedside.  Levo, vaso and bicarb gtt turned off at this time.

## 2025-03-10 NOTE — NURSING
Pt arrived from ED via stretcher.  AAOx3.  VSS.  Blood noted in and around mouth.  Administered 2 units FFP and administered vitamin K prior to intubation.  Updated  and family.

## 2025-03-10 NOTE — ASSESSMENT & PLAN NOTE
- CINDI is likely due to pre-renal azotemia due to intravascular volume depletion. Baseline creatinine is unknown. Most recent creatinine and eGFR are listed below.  Recent Labs     03/10/25  0400 03/10/25  0820 03/10/25  1150   CREATININE 5.3* 5.2* 5.4*   EGFRNORACEVR 9* 10* 9*   - CINDI is worsening. Will continue current treatment  - Avoid nephrotoxins and renally dose meds for GFR listed above  - Monitor urine output, serial BMP, and adjust therapy as needed  - Continue w/ IV bicarbonate infusion @ 125 for now

## 2025-03-10 NOTE — ASSESSMENT & PLAN NOTE
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   - Has been following with Dr. Lopez op  - Started chemotherapy late february; current treatment consisted of docetaxel, carboplatin, TP  - Held chemotherapy on date 3/3 due to notable CINDI/hypotension and sent over to ED from clinic  - Consult hem/onc if assistance needed

## 2025-03-10 NOTE — PROGRESS NOTES
Pharmacokinetic Initial Assessment: IV Vancomycin    Assessment/Plan:    Initiate intravenous vancomycin with loading dose of 1250 mg once with subsequent doses when random concentrations are less than 20 mcg/mL  Desired empiric serum trough concentration is 10 to 20 mcg/mL  Draw vancomycin random level on 3/11/25 at 0430.  Pharmacy will continue to follow and monitor vancomycin.      Please contact pharmacy at extension [phone] with any questions regarding this assessment.     Thank you for the consult,   Braxton Pacheco       Patient brief summary:  Serina Barriga is a 50 y.o. female initiated on antimicrobial therapy with IV Vancomycin for treatment of suspected sepsis    Drug Allergies:   Review of patient's allergies indicates:   Allergen Reactions    Codeine Rash       Actual Body Weight:   68 kg    Renal Function:   Estimated Creatinine Clearance: 11.8 mL/min (A) (based on SCr of 5.2 mg/dL (H)).,     Dialysis Method (if applicable):  N/A    CBC (last 72 hours):  Recent Labs   Lab Result Units 03/07/25  0446 03/09/25  1333 03/09/25  2209   WBC K/uL 6.30 17.46* 21.12*   Hemoglobin g/dL 11.6* 10.6* 7.4*   Hematocrit % 33.4* 30.7* 21.4*   Platelets K/uL 133* 67* 60*   Gran % % 22.0* 64.0 70.0   Lymph % % 41.0 12.0* 9.0*   Mono % % 22.0* 5.0 5.0   Eosinophil % % 1.0 0.0 0.0   Basophil % % 0.0 0.0 0.0   Differential Method  Manual Manual Manual       Metabolic Panel (last 72 hours):  Recent Labs   Lab Result Units 03/07/25  0446 03/09/25  1333 03/09/25  1407 03/09/25  2209   Sodium mmol/L 141 132*  --  134*   Potassium mmol/L 3.5 4.1  --  3.5   Chloride mmol/L 116* 109  --  110   CO2 mmol/L 16* 9*  --  11*   Glucose mg/dL 75 80  --  167*   Glucose, UA   --   --  Trace*  --    BUN mg/dL 46* 105*  --  103*   Creatinine mg/dL 1.9* 5.3*  --  5.2*   Albumin g/dL 1.6* 1.5*  --  1.5*   Total Bilirubin mg/dL 23.5* 25.4*  --  22.0*   Alkaline Phosphatase U/L 679* 841*  --  606*   AST U/L 430* 716*  --  579*   ALT U/L  117* 157*  --  125*   Magnesium mg/dL  --  2.8*  --  2.4   Phosphorus mg/dL  --  7.3*  --   --        Microbiologic Results:  Microbiology Results (last 7 days)       Procedure Component Value Units Date/Time    Culture, Respiratory with Gram Stain [[id number]] Collected: 03/09/25 1825    Order Status: Sent Specimen: Bronchial Wash from Sputum Updated: 03/09/25 1846    Clostridium difficile EIA [[id number]] Collected: 03/09/25 1708    Order Status: Sent Specimen: Stool Updated: 03/09/25 1709    Blood culture x two cultures. Draw prior to antibiotics. [[id number]] Collected: 03/09/25 1346    Order Status: Sent Specimen: Blood from Peripheral, Antecubital, Right     Blood culture x two cultures. Draw prior to antibiotics. [[id number]] Collected: 03/09/25 1336    Order Status: Sent Specimen: Blood from Peripheral, Upper Arm, Left

## 2025-03-10 NOTE — PROGRESS NOTES
[facility] ([facility])  Critical Care Medicine  Progress Note    Patient Name: Serina Barriga  MRN: [id number]  Admission Date: 3/9/2025  Hospital Length of Stay: 1 days  Code Status: DNR  Attending Provider: Chin Gongora MD  Primary Care Provider: Obed Alford MD   Principal Problem: Hypotension    Subjective:     HPI:  50 year old female, comorbid conditions include Breast Cancer, Transaminitis, hyperbilirubinemia. Presented to Oncology clinic with c/o nausea/vomiting, diarrhea. Patient s/p first cycle chemotherapy on 2/24 with docetaxel. Planned for anti-her2 therapy as OP. Patient has known lesions to liver, MRI liver on 2/24 did not show obstructive process.She was discharged recently on 3/7 with CINDI better and resolution of hypotension and electrolyte abn. Now with worsening mucositis, hypotension, epistaxis, mucosal bleeding and weakness. INR, Cr, BUN, Hb, Bicarbonate, LFTs and bilirubin all worse since the day of discharge 48 hrs ago. She is deeply jaundiced and coagulopathic.    Hospital/ICU Course:  03/10/2025: Requiring levo/vaso support. Temp has improved throughout morning. 50/5 on vent. Heavy oral bleeding overnight. H/H 7.6/21.2, plts 69. Cr worsening. Ammonia up to 85. LFTs trending up. Ongoing discussions with /family and have elected DNR status.     Objective:     Vital Signs (Most Recent):  Temp: 99.3 °F (37.4 °C) (03/10/25 1300)  Pulse: 93 (03/10/25 1300)  Resp: 18 (03/10/25 1300)  BP: (!) 120/58 (03/10/25 1300)  SpO2: 100 % (03/10/25 1300) Vital Signs (24h Range):  Temp:  [94.6 °F (34.8 °C)-99.3 °F (37.4 °C)] 99.3 °F (37.4 °C)  Pulse:  [69-99] 93  Resp:  [16-31] 18  SpO2:  [95 %-100 %] 100 %  BP: ()/(40-74) 120/58  Arterial Line BP: (105-146)/(42-55) 115/48     Weight: 68 kg (149 lb 14.6 oz)  Body mass index is 27.42 kg/m².  Intake/Output Summary (Last 24 hours) at 3/10/2025 1328  Last data filed at 3/10/2025 1200  Gross per 24 hour   Intake 9605.65 ml    Output 345 ml   Net 9260.65 ml     Physical Exam  Vitals reviewed.   Constitutional:       Appearance: She is ill-appearing.      Interventions: She is sedated, intubated and restrained.   HENT:      Head: Normocephalic.      Mouth/Throat:      Comments: ET tube in place, dried blood around mouth   Eyes:      General: Scleral icterus present.      Pupils: Pupils are equal, round, and reactive to light.   Cardiovascular:      Rate and Rhythm: Normal rate and regular rhythm.   Pulmonary:      Effort: She is intubated.      Breath sounds: Rhonchi and rales present. No wheezing.   Abdominal:      General: There is no distension.      Palpations: Abdomen is soft.   Skin:     Coloration: Skin is jaundiced.      Findings: Bruising present.     Review of Systems   Unable to perform ROS: Intubated     Vents:  Vent Mode: A/C (03/10/25 1129)  Ventilator Initiated: Yes (03/10/25 0509)  Set Rate: 20 BPM (03/10/25 1129)  Vt Set: 350 mL (03/10/25 1129)  PEEP/CPAP: 5 cmH20 (03/10/25 1129)  Oxygen Concentration (%): 50 (03/10/25 1300)  Peak Airway Pressure: 28 cmH20 (03/10/25 1129)  Plateau Pressure: 0 cmH20 (03/10/25 1129)  Total Ve: 8.96 L/m (03/10/25 1129)  Negative Inspiratory Force (cm H2O): 0 (03/10/25 1129)  F/VT Ratio<105 (RSBI): 238.1 (03/10/25 1129)    Lines/Drains/Airways       Central Venous Catheter Line  Duration                  PowerPort A Cath Single Lumen 02/26/25 1050 Internal Jugular Right 12 days    Percutaneous Central Line - Triple Lumen  03/09/25 1847 Internal Jugular Right <1 day              Drain  Duration                  Urethral Catheter 03/09/25 1800 <1 day              Airway  Duration                  Airway - Non-Surgical 03/09/25 1822 Endotracheal Tube <1 day              Arterial Line  Duration             Arterial Line 03/09/25 2200 Right <1 day              Peripheral Intravenous Line  Duration                  Peripheral IV - Single Lumen 03/09/25 1330 20 G Anterior;Distal;Right Upper Arm <1  day         Peripheral IV - Single Lumen 03/09/25 1335 18 G 1 3/4 in Anterior;Left Upper Arm <1 day                  Significant Labs:    CBC/Anemia Profile:  Recent Labs   Lab 03/09/25  2209 03/10/25  0400 03/10/25  1150   WBC 21.12* 29.45* 35.00*   HGB 7.4* 9.1* 7.6*   HCT 21.4* 25.8* 21.2*   PLT 60* 76* 69*   MCV 76* 79* 78*   RDW 24.4* 23.4* 22.5*     Chemistries:  Recent Labs   Lab 03/09/25  1333 03/09/25  2209 03/10/25  0400 03/10/25  0820 03/10/25  1150   * 134* 134* 134* 135*   K 4.1 3.5 3.3* 3.6 3.9    110 107 106 106   CO2 9* 11* 11* 12* 11*   * 103* 102* 104* 105*   CREATININE 5.3* 5.2* 5.3* 5.2* 5.4*   CALCIUM 6.8* 6.5* 6.0* 5.8* 5.9*   ALBUMIN 1.5* 1.5* 1.7*  --   --    PROT 4.4* 4.0* 4.3*  --   --    BILITOT 25.4* 22.0* 22.9*  --   --    ALKPHOS 841* 606* 607*  --   --    * 125* 136*  --   --    * 579* 628*  --   --    MG 2.8* 2.4 2.4  --   --    PHOS 7.3*  --  7.4*  --   --      Significant Imaging:  I have reviewed all pertinent imaging results/findings within the past 24 hours.  I have reviewed and interpreted all pertinent imaging results/findings within the past 24 hours.    ABG  Recent Labs   Lab 03/10/25  0549   PH 7.329*   PO2 136*   PCO2 26.8*   HCO3 14.1*   BE -12*     Assessment/Plan:     ENT  Mucositis due to chemotherapy  - Mucositis 2/2 systemic chemotherapy  - Complicated now by heavy mucosal bleeding  - Unable to find exact source with ET tube in place   - Continue to monitor blood counts, may need ENT involvement; transfuse as indicated    Renal/  CINDI (acute kidney injury)  - CINDI is likely due to pre-renal azotemia due to intravascular volume depletion. Baseline creatinine is unknown. Most recent creatinine and eGFR are listed below.  Recent Labs     03/10/25  0400 03/10/25  0820 03/10/25  1150   CREATININE 5.3* 5.2* 5.4*   EGFRNORACEVR 9* 10* 9*   - CINDI is worsening. Will continue current treatment  - Avoid nephrotoxins and renally dose meds for GFR  "listed above  - Monitor urine output, serial BMP, and adjust therapy as needed  - Continue w/ IV bicarbonate infusion @ 125 for now    ID  Sepsis with acute renal failure and septic shock  - This patient does not have evidence of infective focus. My overall impression is septic shock due to MAP < 65. Source: Unknown  - Antibiotics given:  Antibiotics (72h ago, onward)      Start     Stop Route Frequency Ordered    03/10/25 0900  mupirocin 2 % ointment         03/15/25 0859 Nasl 2 times daily 03/09/25 2019    03/10/25 0000  cefTRIAXone injection 2 g         -- IV Every 24 hours (non-standard times) 03/09/25 2247 03/09/25 2347  vancomycin - pharmacy to dose  (vancomycin IVPB (PEDS and ADULTS))        Placed in "And" Linked Group    -- IV pharmacy to manage frequency 03/09/25 2247        - Latest lactate reviewed:  Recent Labs   Lab 03/09/25 2011   LACTATE 0.6   - Organ dysfunction indicated by Acute kidney injury and Acute liver injury  - Fluid challenge Ideal Body Weight- The patient's ideal body weight is Ideal body weight: 50.1 kg (110 lb 7.2 oz) which will be used to calculate fluid bolus of 30 ml/kg for treatment of septic shock.    - Post- resuscitation assessment Yes - I attest a sepsis perfusion exam was performed within 6 hours of sepsis, severe sepsis, or septic shock presentation, following fluid resuscitation.  - Will Start Pressors- Levophed for MAP of 65  - Source control achieved by: On broad spectrum Abx  - Blood cx with no growth     Hematology  Thrombocytopenia  - The likely etiology of thrombocytopenia is liver disease. The patients 3 most recent labs are listed below.  Recent Labs     03/09/25  2209 03/10/25  0400 03/10/25  1150   PLT 60* 76* 69*   - Will transfuse if platelet count is <50k (if undergoing surgical procedure or have active bleeding).    Oncology  Malignant neoplasm of overlapping sites of left breast in female, estrogen receptor negative  - Recent diagnosis of L breast invasive " ductal carcinoma in February; PET scan + liver mets.   - Has been following with Dr. Lopez op  - Started chemotherapy late february; current treatment consisted of docetaxel, carboplatin, TP  - Held chemotherapy on date 3/3 due to notable CINDI/hypotension and sent over to ED from clinic  - Consult hem/onc if assistance needed    GI  Acute liver failure without hepatic coma  - Patient is likely to have ischemic hepatitis as evidenced by abnormalities in AST, ALT, T. BILI, ALBUMIN, and INR. Etiology includes  metastatic disease . Continue to monitor liver function while treating underlying condition. Daily labs to include CMP, Liver Function Panel, and PT/INR.    AST   Date Value Ref Range Status   03/10/2025 628 (H) 10 - 40 U/L Final     ALT   Date Value Ref Range Status   03/10/2025 136 (H) 10 - 44 U/L Final   - Hepatology has been consulted   - Continue to monitor LFT trends and avoid hepatotoxic agents  - FFP to reverse coagulopathy / possible GI bleed / Mucositis and oral / nasal bleeding.    Transaminitis  - See [facility]    Palliative Care  Goals of care, counseling/discussion  - Patient is DNR  - SEE ACP NOTE    DVT Prophylaxis: Contraindicated given mucosal bleeding, SCDs  GI Prophylaxis: Pepcid    Critical Care Time: 43 minutes  Critical secondary to Patient has a condition that poses threat to life and bodily function: penitentiary, sepsis, CINDI     Critical care was time spent personally by me on the following activities: development of treatment plan with patient or surrogate and bedside caregivers, discussions with consultants, evaluation of patient's response to treatment, examination of patient, ordering and performing treatments and interventions, ordering and review of laboratory studies, ordering and review of radiographic studies, pulse oximetry, re-evaluation of patient's condition. This critical care time did not overlap with that of any other provider or involve time for any procedures.     Guillermo Rogers,  LAYLA  Critical Care Medicine  [facility] ([facility])   23w4d Home

## 2025-03-10 NOTE — CONSULTS
[facility] ([facility])  Adult Nutrition  Consult Note    SUMMARY     Recommendations    Recommendation/Intervention:   1. When medically appropriate, initiate pt onto continuous Enteral nutrition, recommend Peptamen 1.5 w/ Prebio via NG/OG tube at goal rate 30 mL/hr, starting at 10 mL/hr then progress to goal rate within 24 hrs if pt is tolerating and per MD/NP   -Formula at goal rate provides: 1080 kcals/day w/out Propofol (71% EEN), 1402.08 kcals/day w/ Propofol ( 93% EEN), 49 g protein/day (91% EPN), 138 g CHO/day (73% CHO needs), 556 mL free formula water/day    -160 mL q4h free water flushes (960 mL/day) = total 1516 mL/day, per MD/NP   -Check Mg, K+, Na, Phos and Glu before and during initiation, correct as indicated   2. Recommend 500 mg/day vitamin C for wound healing   3. Weigh twice weekly    Goals:   1. Pt will be initiated onto continuous EN within 24 hrs   2. Pt will tolerate and intake >75% EEN and EPN prior to RD follow up   3. Pt will receive vitamin C supplement prior to RD follow up  Nutrition Goal Status: new  Communication of RD Recs: other (comment) (Consult, POC, sticky note)    Nutrition Discharge Planning     Nutrition Discharge Planning: Too early to determine, pending clinical course    Assessment and Plan    Nutrition Problem  Inadequate protein-energy intake   Increased nutrient needs: (energy, protein, vitamin C)    Related to (etiology):   Decreased ability to consume sufficient protein/energy intake   Increased demand for nutrition     Signs and Symptoms (as evidenced by):   Intubated and sedated, NPO  Wound healing needs    Interventions/Recommendations (treatment strategy):  1. Management of composition and rate of enteral nutrition   2. Vitamin C supplement therapy  3. Collaboration by nutrition professional with other providers    Nutrition Diagnosis Status:   New       Malnutrition Assessment     Skin (Micronutrient): bruised, yellow, edema, wounds unhealed (mottled;  Elliott score = 9 (very high risk)       Weight Loss (Malnutrition): greater than 5% in 1 month  Fluid Accumulation (Malnutrition): moderate                         Reason for Assessment    Reason For Assessment: consult, new tube feeding  Diagnosis:  (Mucositis due to chemotherapy)  General Information Comments:   3/10/25: 50 y.o. Female admitted for Mucositis due to chemotherapy. PMH: Transaminitis, Malnignant neoplasm of L breast, CINDI, Acute liver failure w/ out hepatic coma, Sepsis w/ CINDI and Septic shock, Thrombocytopenia, Epistaxis; Hx: Chemotherapy induced diarrhea, Intractable N/V, Neutropenia. Pt is currently intubated and sedated on Propofol and Fentanyl, NPO, in the ICU. RD consulted for TF recommendations. H&P noted that the pt presented to Oncology clinic c/o N/V/D, pt is s/p first cycle chemotherapy on 2/24/25 (docetaxel), pt was recently d/c'd on 3/7 w/ CINDI better and resolution of hypotension and electrolyte abn, pt now w/ worsening mucositis, hypotension, epistaxis, mucosal bleeding and weakness. EMR noted that the pt was intubated yesterday 3/9 and is having continuous oral bleeding throughout the night w/ frequent suction. Discussed pt during rounds, RN noted AMS, pt voluntarily intubated, pt has no NG or OG tube at this time, increased bloody oozing/secreations, pt has multisystem organ failure. Per chart: LBM 3/10 (black/liquid); 3+ moderate edema; IASD perirectal/ MASD gluteal cleft both partial thickness tissue losses, details per Wound care note 3/10/25. Labs, meds, weights reviewed. Weight charted 2/5/25 159 lbs, 3/9/25 150 lbs (BMI 27.44, Overweight), -9 lb wt loss (6% wt change) x 3+ months. Visual NFPE performed, mild orbital malnutition noted, will perfrom NFPE at follow up. RD will continue to follow and monitor pt's nutritional status during admit.    Nutrition/Diet History    Spiritual, Cultural Beliefs, Methodist Practices, Values that Affect Care: no  Food Allergies: NKFA  Factors  "Affecting Nutritional Intake: impaired cognitive status/motor control, NPO, on mechanical ventilation    Nutrition Related Social Determinants of Health: SDOH: Unable to assess at this time.     Anthropometrics    Height: 5' 2" (157.5 cm)  Height (inches): 62 in  Height Method: Stated  Weight: 68 kg (149 lb 14.6 oz)  Weight (lb): 149.91 lb  Weight Method: Stated  Ideal Body Weight (IBW), Female: 110 lb  % Ideal Body Weight, Female (lb): 136.28 %  BMI (Calculated): 27.4  BMI Grade: 25 - 29.9 - overweight  Usual Body Weight (UBW), k.27 kg  Weight Change Amount: 9 lb  % Usual Body Weight: 94.29  % Weight Change From Usual Weight: -5.91 %       Wt Readings from Last 15 Encounters:   03/10/25 68 kg (149 lb 14.6 oz)   25 73.2 kg (161 lb 6 oz)   25 67.6 kg (149 lb 0.5 oz)   25 70.3 kg (155 lb)   25 70.4 kg (155 lb 3.3 oz)   25 69.5 kg (153 lb 3.5 oz)   25 69.9 kg (154 lb 1.6 oz)   25 72.3 kg (159 lb 6.3 oz)   24 72.3 kg (159 lb 6.3 oz)   24 72.3 kg (159 lb 6.3 oz)   23 69.7 kg (153 lb 10.6 oz)   23 69.7 kg (153 lb 10.6 oz)   23 68.9 kg (151 lb 14.4 oz)   22 68.9 kg (152 lb)   22 69.6 kg (153 lb 5.3 oz)     Lab/Procedures/Meds    Pertinent Labs Reviewed: reviewed  Pertinent Medications Reviewed: reviewed  Pertinent Medications Comments: vitamin K    BMP  Lab Results   Component Value Date     (L) 03/10/2025    K 3.9 03/10/2025     03/10/2025    CO2 11 (L) 03/10/2025     (H) 03/10/2025    CREATININE 5.4 (H) 03/10/2025    CALCIUM 5.9 (LL) 03/10/2025    ANIONGAP 18 (H) 03/10/2025    EGFRNORACEVR 9 (A) 03/10/2025     Lab Results   Component Value Date    CALCIUM 5.9 (LL) 03/10/2025    PHOS 7.4 (H) 03/10/2025     Lab Results   Component Value Date    ALBUMIN 1.7 (L) 03/10/2025     Lab Results   Component Value Date     (H) 03/10/2025     (H) 03/10/2025    ALKPHOS 607 (H) 03/10/2025    BILITOT 22.9 (H) " "03/10/2025     No results for input(s): "POCTGLUCOSE" in the last 24 hours.    Lab Results   Component Value Date    HGBA1C 5.2 11/18/2024     Lab Results   Component Value Date    WBC 35.00 (H) 03/10/2025    HGB 7.6 (L) 03/10/2025    HCT 21.2 (L) 03/10/2025    MCV 78 (L) 03/10/2025    PLT 69 (L) 03/10/2025       Scheduled Meds:   cefTRIAXone (Rocephin) IV (PEDS and ADULTS)  2 g Intravenous Q24H    chlorhexidine  15 mL Mouth/Throat BID    famotidine (PF)  20 mg Intravenous Daily    lorazepam  1 mg Intravenous Once    mupirocin   Nasal BID    phytonadione vitamin k (AQUA-MEPHYTON) 1 mg in D5W 50 mL IVPB  1 mg Intravenous Daily     Continuous Infusions:   fentanyl  0-250 mcg/hr Intravenous Continuous 7.5 mL/hr at 03/10/25 1300 75 mcg/hr at 03/10/25 1300    NORepinephrine bitartrate-D5W  0-3 mcg/kg/min Intravenous Continuous 19.8 mL/hr at 03/10/25 1403 0.62 mcg/kg/min at 03/10/25 1403    propofoL  0-50 mcg/kg/min Intravenous Continuous 12.2 mL/hr at 03/10/25 1300 30 mcg/kg/min at 03/10/25 1300    sodium bicarbonate 150 mEq in D5W 1,150 mL infusion   Intravenous Continuous 150 mL/hr at 03/10/25 1300 Rate Verify at 03/10/25 1300    vasopressin  0.04 Units/min Intravenous Continuous 12 mL/hr at 03/10/25 1139 0.04 Units/min at 03/10/25 1139     PRN Meds:.  Current Facility-Administered Medications:     0.9%  NaCl infusion (for blood administration), , Intravenous, Q24H PRN    0.9%  NaCl infusion (for blood administration), , Intravenous, Q24H PRN    dextrose 50%, 12.5 g, Intravenous, PRN    glucagon (human recombinant), 1 mg, Intramuscular, PRN    insulin aspart U-100, 0-5 Units, Subcutaneous, Q6H PRN    magic mouthwash (diphenhydrAMINE 12.5 mg/5 mL 20 mL, aluminum & magnesium hydroxide-simethicone (MYLANTA) 20 mL, LIDOcaine HCl 2% (XYLOCAINE) 20 mL) solution, 15 mL, Swish & Spit, Q4H PRN    pneumoc 20-korin conj-dip cr(PF), 0.5 mL, Intramuscular, Prior to discharge    sodium chloride 0.9%, 10 mL, Intravenous, PRN    " Pharmacy to dose Vancomycin consult, , , Once **AND** vancomycin - pharmacy to dose, , Intravenous, pharmacy to manage frequency      Estimated/Assessed Needs    Weight Used For Calorie Calculations: 68 kg (149 lb 14.6 oz)  Energy Calorie Requirements (kcal): 1516 kcals ([place] state (Critical care-vent, BMI < 30)  Energy Need Method: [facility]  Protein Requirements: 54-68 g (0.8-1.0 g/kg ABW (CINDI, no dialysis)  Weight Used For Protein Calculations: 68 kg (149 lb 14.6 oz)  Fluid Requirements (mL): 500 mL + total output (CINDI, per MD/NP)  Estimated Fluid Requirement Method: other (see comments)  RDA Method (mL): 1516  CHO Requirement: 190 g (1516 kcals/8)      Nutrition Prescription Ordered    Current Diet Order: NPO    Evaluation of Received Nutrient/Fluid Intake  I/O: (Net since admit):  3/10/25: +7866.4 mL    Enteral Calories (kcal): 0  Enteral Protein (gm): 0  Enteral (Free Water) Fluid (mL): 0  Free Water Flush Fluid (mL): 0  Total Calories (kcal/kg): 322.08 (from Propofol)  % Kcal Needs: 21%  % Protein Needs: 0%    Energy Calories Required: not meeting needs  Protein Required: not meeting needs  Fluid Required: exceeds needs  Total Fluid Intake (mL): 8211.5  Comments: LBM 3/10 (black/liquid)  Tolerance: other (see comments) (Currently no intake)  % Intake of Estimated Energy Needs: 0 - 25 %  % Meal Intake: NPO    Nutrition Risk    Level of Risk/Frequency of Follow-up: high (F/u x 2 weekly)       Monitor and Evaluation    Monitor and Evaluation: Energy intake, Protein intake, Carbohydrate intake, Enteral and parenteral nutrition administration, Weight, Electrolyte and renal panel, Gastrointestinal profile, Glucose/endocrine profile, Inflammatory profile, Nutrition focused physical findings, Skin       Nutrition Follow-Up    RD Follow-up?: Yes  Linsey Marquez, BS, RDN, LDN

## 2025-03-10 NOTE — ASSESSMENT & PLAN NOTE
"- This patient does not have evidence of infective focus. My overall impression is septic shock due to MAP < 65. Source: Unknown  - Antibiotics given:  Antibiotics (72h ago, onward)      Start     Stop Route Frequency Ordered    03/10/25 0900  mupirocin 2 % ointment         03/15/25 0859 Nasl 2 times daily 03/09/25 2019    03/10/25 0000  cefTRIAXone injection 2 g         -- IV Every 24 hours (non-standard times) 03/09/25 2247 03/09/25 2347  vancomycin - pharmacy to dose  (vancomycin IVPB (PEDS and ADULTS))        Placed in "And" Linked Group    -- IV pharmacy to manage frequency 03/09/25 2247        - Latest lactate reviewed:  Recent Labs   Lab 03/09/25 2011   LACTATE 0.6   - Organ dysfunction indicated by Acute kidney injury and Acute liver injury  - Fluid challenge Ideal Body Weight- The patient's ideal body weight is Ideal body weight: 50.1 kg (110 lb 7.2 oz) which will be used to calculate fluid bolus of 30 ml/kg for treatment of septic shock.    - Post- resuscitation assessment Yes - I attest a sepsis perfusion exam was performed within 6 hours of sepsis, severe sepsis, or septic shock presentation, following fluid resuscitation.  - Will Start Pressors- Levophed for MAP of 65  - Source control achieved by: On broad spectrum Abx  - Blood cx with no growth     "

## 2025-03-10 NOTE — ACP (ADVANCE CARE PLANNING)
Advance Care Planning     Date: 03/10/2025    [facility]  I engaged the family in a voluntary conversation about advance care planning and we specifically addressed what the goals of care would be moving forward, in light of the patient's change in clinical status, specifically worsening CONSUELO, kidney failure, hypotension.  We did specifically address the patient's likely prognosis, which is poor.  We explored the patient's values and preferences for future care.  The family endorses that what is most important right now is to focus on comfort and QOL     Accordingly, we have decided that the best plan to meet the patient's goals includes no further escalation in treatment and pivot to comfort-focused care    Decision for comfort measures made around 1738, all gtts d/c'ed and turned off. Family at bedside    A total of 23 min was spent on advance care planning, goals of care discussion, emotional support, formulating and communicating prognosis and exploring burden/benefit of various approaches of treatment. This discussion occurred on a fully voluntary basis with the verbal consent of the patient and/or family.

## 2025-03-10 NOTE — PROCEDURES
"Serina Barriga is a 50 y.o. female patient.    Temp: (!) 95.9 °F (35.5 °C) (03/09/25 2051)  Pulse: 86 (03/09/25 2051)  Resp: 20 (03/09/25 2051)  BP: (!) 102/51 (03/09/25 2051)  SpO2: 100 % (03/09/25 2051)  Weight: 68 kg (150 lb) (03/09/25 1259)  Height: 5' 2" (157.5 cm) (03/09/25 1259)       Arterial Line    Date/Time: 3/9/2025 9:45 PM  Location procedure was performed: [facility] INTENSIVE CARE UNIT    Performed by: Marla Abreu ACNP-BC  Authorized by: Marla Abreu ACNP-BC  Pre-op Diagnosis: septic shock  Post-operative diagnosis: septic shock  Consent Done: Not Needed  Preparation: Patient was prepped and draped in the usual sterile fashion.  Indications: multiple ABGs and hemodynamic monitoring  Location: right radial    Patient sedated: yes (see MAR for vent sedation)  Brady's test normal: no  Needle gauge: 20  Seldinger technique: Seldinger technique used  Number of attempts: 2  Complications: No  Specimens: No  Implants: No  Post-procedure: dressing applied (secured with mastisol and steri strips; biopatch and clear occlusive dressing)  Post-procedure CMS: unchanged  Patient tolerance: Patient tolerated the procedure well with no immediate complications        3/9/2025    "

## 2025-03-10 NOTE — ASSESSMENT & PLAN NOTE
- The likely etiology of thrombocytopenia is liver disease. The patients 3 most recent labs are listed below.  Recent Labs     03/09/25  2209 03/10/25  0400 03/10/25  1150   PLT 60* 76* 69*   - Will transfuse if platelet count is <50k (if undergoing surgical procedure or have active bleeding).

## 2025-03-10 NOTE — PLAN OF CARE
[facility] - Intensive Care (Hospital)  Initial Discharge Assessment       Primary Care Provider: Obed Alford MD    Admission Diagnosis: Epistaxis [R04.0]  Rash [R21]  Screening for cardiovascular condition [Z13.6]  Mucositis due to chemotherapy [K12.31]  Acute renal failure, unspecified acute renal failure type [N17.9]  Sepsis, due to unspecified organism, unspecified whether acute organ dysfunction present [A41.9]    Admission Date: 3/9/2025  Expected Discharge Date:     Transition of Care Barriers: None    Payor: BLUE CROSS BLUE [facility] / Plan: [place] OF [place] LOC[place] PL[place] / Product Type: Commercial /     Extended Emergency Contact Information  Primary Emergency Contact: Bakari Barriga  Address: [address] [place] [address]           [place] [address] [facility]  Home Phone: [phone]  Mobile Phone: [phone]  Relation: Spouse    Discharge Plan A: Other (pending hospital course)         [id number] [place] [facility] [address] [facility]  [address] [facility]  [place] [id number]  Phone: [phone] Fax: [phone]      Initial Assessment (most recent)       Adult Discharge Assessment - 03/10/25 1037          Discharge Assessment    Assessment Type Discharge Planning Assessment     Confirmed/corrected address, phone number and insurance Yes     Confirmed Demographics Correct on Facesheet     Source of Information health record     Communicated ESTHELA with patient/caregiver Date not available/Unable to determine     Reason For Admission Sepsis     People in Home spouse;child(renato), adult     Facility Arrived From: home     Do you expect to return to your current living situation? Yes     Do you have help at home or someone to help you manage your care at home? Yes     Who are your caregiver(s) and their phone number(s)? spouse and daughter     Prior to hospitilization cognitive status: Alert/Oriented     Current cognitive status: Unable to Assess;Coma/Sedated/Intubated     Walking or Climbing  Stairs Difficulty no     Dressing/Bathing Difficulty no     Home Accessibility wheelchair accessible     Home Layout Able to live on 1st floor     Equipment Currently Used at Home none     Readmission within 30 days? No     Patient currently being followed by outpatient case management? No     Do you currently have service(s) that help you manage your care at home? No     Do you take prescription medications? Yes     Do you have prescription coverage? Yes     Coverage commercial     Do you have any problems affording any of your prescribed medications? No     Is the patient taking medications as prescribed? yes     Who is going to help you get home at discharge? family     How do you get to doctors appointments? family or friend will provide     Are you on dialysis? No     Do you take coumadin? No     Discharge Plan A Other   pending hospital course    DME Needed Upon Discharge  none     Discharge Plan discussed with: Patient     Transition of Care Barriers None                   Anticipated DC dispo: pending hospital course   Prior Level of Function: independent with ADLs   People in home: spouse and adult daughter     Comments:  Chart review completed for discharge planning. Family will be help at home and can provide transport at time of discharge. Confirmed demographics, insurance, and emergency contacts. CM discharge needs depends on hospital progress. CM will continue following to assist with other needs. Discharge plan has been determined by review of patient's clinical status, future medical and therapeutic needs, and coverage/benefits for post-acute care in coordination with multidisciplinary team members.

## 2025-03-10 NOTE — ASSESSMENT & PLAN NOTE
- Mucositis 2/2 systemic chemotherapy  - Complicated now by heavy mucosal bleeding  - Unable to find exact source with ET tube in place   - Continue to monitor blood counts, may need ENT involvement; transfuse as indicated

## 2025-03-10 NOTE — HOSPITAL COURSE
03/10/2025: Requiring levo/vaso support. Temp has improved throughout morning. 50/5 on vent. Heavy oral bleeding overnight. H/H 7.6/21.2, plts 69. Cr worsening. Ammonia up to 85. LFTs trending up. Ongoing discussions with /family and have elected DNR status.   10:36 PM- patient was transitioned to comfort measures this afternoon and  at 2232 with family at bedside. She was pronounced by Dr. Jolly.

## 2025-03-10 NOTE — SUBJECTIVE & OBJECTIVE
Objective:     Vital Signs (Most Recent):  Temp: 99.3 °F (37.4 °C) (03/10/25 1300)  Pulse: 93 (03/10/25 1300)  Resp: 18 (03/10/25 1300)  BP: (!) 120/58 (03/10/25 1300)  SpO2: 100 % (03/10/25 1300) Vital Signs (24h Range):  Temp:  [94.6 °F (34.8 °C)-99.3 °F (37.4 °C)] 99.3 °F (37.4 °C)  Pulse:  [69-99] 93  Resp:  [16-31] 18  SpO2:  [95 %-100 %] 100 %  BP: ()/(40-74) 120/58  Arterial Line BP: (105-146)/(42-55) 115/48     Weight: 68 kg (149 lb 14.6 oz)  Body mass index is 27.42 kg/m².  Intake/Output Summary (Last 24 hours) at 3/10/2025 1328  Last data filed at 3/10/2025 1200  Gross per 24 hour   Intake 9605.65 ml   Output 345 ml   Net 9260.65 ml     Physical Exam  Vitals reviewed.   Constitutional:       Appearance: She is ill-appearing.      Interventions: She is sedated, intubated and restrained.   HENT:      Head: Normocephalic.      Mouth/Throat:      Comments: ET tube in place, dried blood around mouth   Eyes:      General: Scleral icterus present.      Pupils: Pupils are equal, round, and reactive to light.   Cardiovascular:      Rate and Rhythm: Normal rate and regular rhythm.   Pulmonary:      Effort: She is intubated.      Breath sounds: Rhonchi and rales present. No wheezing.   Abdominal:      General: There is no distension.      Palpations: Abdomen is soft.   Skin:     Coloration: Skin is jaundiced.      Findings: Bruising present.     Review of Systems   Unable to perform ROS: Intubated     Vents:  Vent Mode: A/C (03/10/25 1129)  Ventilator Initiated: Yes (03/10/25 0509)  Set Rate: 20 BPM (03/10/25 1129)  Vt Set: 350 mL (03/10/25 1129)  PEEP/CPAP: 5 cmH20 (03/10/25 1129)  Oxygen Concentration (%): 50 (03/10/25 1300)  Peak Airway Pressure: 28 cmH20 (03/10/25 1129)  Plateau Pressure: 0 cmH20 (03/10/25 1129)  Total Ve: 8.96 L/m (03/10/25 1129)  Negative Inspiratory Force (cm H2O): 0 (03/10/25 1129)  F/VT Ratio<105 (RSBI): 238.1 (03/10/25 1129)    Lines/Drains/Airways       Central Venous Catheter Line   Duration                  PowerPort A Cath Single Lumen 02/26/25 1050 Internal Jugular Right 12 days    Percutaneous Central Line - Triple Lumen  03/09/25 1847 Internal Jugular Right <1 day              Drain  Duration                  Urethral Catheter 03/09/25 1800 <1 day              Airway  Duration                  Airway - Non-Surgical 03/09/25 1822 Endotracheal Tube <1 day              Arterial Line  Duration             Arterial Line 03/09/25 2200 Right <1 day              Peripheral Intravenous Line  Duration                  Peripheral IV - Single Lumen 03/09/25 1330 20 G Anterior;Distal;Right Upper Arm <1 day         Peripheral IV - Single Lumen 03/09/25 1335 18 G 1 3/4 in Anterior;Left Upper Arm <1 day                  Significant Labs:    CBC/Anemia Profile:  Recent Labs   Lab 03/09/25  2209 03/10/25  0400 03/10/25  1150   WBC 21.12* 29.45* 35.00*   HGB 7.4* 9.1* 7.6*   HCT 21.4* 25.8* 21.2*   PLT 60* 76* 69*   MCV 76* 79* 78*   RDW 24.4* 23.4* 22.5*     Chemistries:  Recent Labs   Lab 03/09/25  1333 03/09/25  2209 03/10/25  0400 03/10/25  0820 03/10/25  1150   * 134* 134* 134* 135*   K 4.1 3.5 3.3* 3.6 3.9    110 107 106 106   CO2 9* 11* 11* 12* 11*   * 103* 102* 104* 105*   CREATININE 5.3* 5.2* 5.3* 5.2* 5.4*   CALCIUM 6.8* 6.5* 6.0* 5.8* 5.9*   ALBUMIN 1.5* 1.5* 1.7*  --   --    PROT 4.4* 4.0* 4.3*  --   --    BILITOT 25.4* 22.0* 22.9*  --   --    ALKPHOS 841* 606* 607*  --   --    * 125* 136*  --   --    * 579* 628*  --   --    MG 2.8* 2.4 2.4  --   --    PHOS 7.3*  --  7.4*  --   --      Significant Imaging:  I have reviewed all pertinent imaging results/findings within the past 24 hours.  I have reviewed and interpreted all pertinent imaging results/findings within the past 24 hours.

## 2025-03-10 NOTE — PLAN OF CARE
Pt sedated with propofol and fentanyl, intubated, intermittently nods heads to questions. NSR on monitor. Temp low over night to 95 F per wu temp probe, junito hugger applied with improvement to 97.9 F this morning. Sats 100% on current vent settings. Oral bleeding continuously throughout night, frequent suction and oral cleaning performed. Wu in place with minimal output, NP aware. ART line in place, BP low over night, levophed titrated to achieve MAP >65, vasopressin added this morning. Bilateral wrist restraints in place. Skin jaundiced, petechiae generalized anteriorly and posteriorly. Weight shifting q2, green heel boots in place.

## 2025-03-10 NOTE — CONSULTS
[facility] - Intensive Care (Hospital)  Wound Care    Patient Name:  Serina Barriga   MRN:  [id number]  Date: 3/10/2025  Diagnosis: <principal problem not specified>    History:     Past Medical History:   Diagnosis Date    Depression     controlled with meds    Fibroid December 2, 2022    Hypertension     It's controlled with meds.       Social History[1]    Precautions:     Allergies as of 03/09/2025 - Reviewed 03/09/2025   Allergen Reaction Noted    Codeine Rash 06/14/2016       [facility] Assessment Details/Treatment     Consulted on this 51 y/o F patient due to present on admission skin breakdown and discolorations. Patient admitted with rash and epitaxis and bleeding from gums and stool, and has PMH significant for breast cancer with liver mets, CINDI, acute liver failure, HTN. She was recently discharged from [facility], and returned due to rash and nose bleed.   She is seen in ICU, intubated, sedated, in 2 IV pressors. Noted with oral bleeding, oozing from IV site, Mediport site as well. Lips are crusted with dry blood.  Skin is jaundiced - bilirubin 22.9  Petechial, purpuric rash noted to upper chest and upper back, and extending out with scattered lesions to abdomen, BUE, BLE. PLT 69.  Distal fingers/nail beds and toes/nail beds with purple/maroon discoloration likely related to ischemia from IV pressors. Scattered maroon discolorations to bilateral feet, bilateral achilles, and right elbow also likely related to IV pressors - painted with cavilon. Heel offloading boots maintained.  LUE large area of ecchymosis also noted.  Sacrum is intact with no breakdown. Midline gluteal cleft small open split in crease noted related to moisture, open area measures 2x0.2x0.1cm with moist red wound bed. Cleansed with saline, painted with cavilon, and foam dressing applied.  Roula-rectal skin IAD noted with maroon discoloration and scattered areas of partial thickness tissue loss. Moisture barrier paste applied to affected  skin.    Recommendations made to primary team for wound care, pressure injury prevention interventions, moisture management.     Patient's at high risk for skin failure and expected to develop worsening wounds and skin breakdown related to this and meets the diagnosis guidelines due to meeting the following Major and Minor criteria for SKIN FAILURE diagnosis:    Major Criteria met include:  Cardiovascular failure (requiring either inotrope, vasopressor, or mechanical circulatory support) - on Vasopressin and Norepinephrine Gtts  Respiratory failure (requiring either invasive or non-invasive ventilation (ie. BiPAP, AVAPS), or high flow nasal cannula oxygen) - intubated  Renal failure requiring acute dialysis - (either HD, CVVHD, or AVVH) - CINDI, but no HD - made a DNR today  Neurological failure with GCS <10 NOT due to sedation and/or FAST stage 7 for progressive advanced neurologic illness - on sedation  Acute Hepatic failure with bilirubin >4 - Bilirubin 22.9  Hematological failure with thrombocytopenia <100k - PLT 69  End stage disease processes (ie. Malignancy, renal, heart failure, pulmonary, liver failure) - breast cancer with liver mets and liver failure    Minor Criteria met include:  Hypoalbuminemia <3.5, malnourished, or cachectic state - albumin 1.7  Unusual shape skin injuries (such as pear, butterfly, or horseshoe shape) - peripheral ischemic changes    Patient has been has the following pressure injury prevention interventions in place including: turning q2h with foam wedge, frequent repositioning, heels offloaded, and moisture management.             03/10/25 0940   WOCN Assessment   WOCN Total Time (mins) 60   Visit Date 03/10/25   Visit Time 0940   Consult Type New   WOCN Speciality Wound   Wound moisture;At risk for pressure Injury;other   Intervention assessed;applied;chart review;coordination of care;team conference;orders   Teaching   (unable due to patient condition)   Pressure Injury Prevention     Check Moisture Management Pad Done   Sacral Foam Dressing Peel back sacral foam dressing, assess skin and reapply   Heel protection technique Heel boot   Heel preventative measures Peel back dressing/boot, assess skin and reapply   Check Medical Devices Done        Wound 03/09/25 1900 Incontinence associated dermatitis Perirectal   Date First Assessed/Time First Assessed: 03/09/25 1900   Present on Original Admission: Yes  Primary Wound Type: Incontinence associated dermatitis  Location: Perirectal   Wound Image    Dressing Appearance Open to air   Drainage Amount Scant   Drainage Characteristics/Odor Serous   Appearance Maroon   Tissue loss description Partial thickness   Periwound Area Redness   Care Cleansed with:;Sterile normal saline;Applied:;Skin Barrier        Wound 03/10/25 0700 Skin changes at life end (scale) Left upper;posterior Arm   Date First Assessed/Time First Assessed: 03/10/25 0700   Primary Wound Type: Skin changes at life end (scale)  Side: Left  Orientation: upper;posterior  Location: Arm   Wound Image    Dressing Appearance Open to air   Drainage Amount None   Tissue loss description Not applicable   Periwound Area Ecchymotic        Wound 03/10/25 0700 Skin changes at life end (scale) Right Foot   Date First Assessed/Time First Assessed: 03/10/25 0700   Primary Wound Type: Skin changes at life end (scale)  Side: Right  Location: Foot   Wound Image       Dressing Appearance Open to air   Drainage Amount None   Appearance Maroon   Tissue loss description Not applicable   Care Applied:;Skin Barrier        Wound 03/10/25 0700 Skin changes at life end (scale) Left Foot   Date First Assessed/Time First Assessed: 03/10/25 0700   Primary Wound Type: Skin changes at life end (scale)  Side: Left  Location: Foot   Wound Image       Dressing Appearance Open to air   Drainage Amount None   Appearance Maroon   Care Applied:;Skin Barrier        Wound 03/10/25 Rash anterior;posterior;upper Torso  macular;other (see comments)   Date First Assessed: 03/10/25   Present on Original Admission: Yes  Primary Wound Type: Rash  Orientation: anterior;posterior;upper  Location: (c) Torso  Type: (c) macular;other (see comments)   Wound Image       Distribution regional   Characteristics redness/erythema  (purpuric, petechaiel)   Color other (see comments)  (maroon)   Lesion Size less than 0.5 cm        Wound 03/10/25 Moisture associated dermatitis midline Gluteal cleft   Date First Assessed: 03/10/25   Present on Original Admission: Yes  Primary Wound Type: Moisture associated dermatitis  Orientation: midline  Location: Gluteal cleft   Wound Image    Dressing Appearance Open to air   Drainage Amount None   Appearance Red;Moist   Tissue loss description Partial thickness   Periwound Area Intact   Wound Edges Open   Wound Length (cm) 2 cm   Wound Width (cm) 0.2 cm   Wound Depth (cm) 0.1 cm   Wound Volume (cm^3) 0.021 cm^3   Wound Surface Area (cm^2) 0.31 cm^2   Care Cleansed with:;Antimicrobial agent;Applied:;Skin Barrier   Dressing Foam   Dressing Change Due 03/13/25       03/10/2025         [1]   Social History  Socioeconomic History    Marital status:    Tobacco Use    Smoking status: Never    Smokeless tobacco: Never   Substance and Sexual Activity    Alcohol use: No    Drug use: No    Sexual activity: Yes     Partners: Male     Birth control/protection: OCP     Social Drivers of Health     Financial Resource Strain: Low Risk  (3/6/2025)    Overall Financial Resource Strain (CARDIA)     Difficulty of Paying Living Expenses: Not hard at all   Food Insecurity: No Food Insecurity (3/6/2025)    Hunger Vital Sign     Worried About Running Out of Food in the Last Year: Never true     Ran Out of Food in the Last Year: Never true   Transportation Needs: No Transportation Needs (2/19/2025)    PRAPARE - Transportation     Lack of Transportation (Medical): No     Lack of Transportation (Non-Medical): No   Physical  Activity: Insufficiently Active (2/19/2025)    Exercise Vital Sign     Days of Exercise per Week: 2 days     Minutes of Exercise per Session: 20 min   Stress: No Stress Concern Present (3/6/2025)    [employer] - Occupational Stress Questionnaire     Feeling of Stress : Not at all   Recent Concern: Stress - Stress Concern Present (3/4/2025)    [employer] - Occupational Stress Questionnaire     Feeling of Stress : Rather much   Housing Stability: Low Risk  (3/6/2025)    Housing Stability Vital Sign     Unable to Pay for Housing in the Last Year: No     Number of Times Moved in the Last Year: 0     Homeless in the Last Year: No

## 2025-03-11 NOTE — ACP (ADVANCE CARE PLANNING)
As previously discussed with day providers, patient was transitioned to comfort measures, but remained intubated for management of bleeding/secretions.   Discussed with family that patient was still on assist control settings. They wanted patient to be comfortable and for there to be nothing standing in the way of her transitioning comfortably. Her  and father agreed that we change ventilator mode to spontaneous with minimal additional support. Changes made with some respiratory effort still noted.   After some time, patient became apneic with persistent alarming of ventilator and initiating back up rate.   Discussed with family that since patient is now apneic, there should be no further issues with secretion management and we decided together to extubate patient for palliative purposes in order to maintain her comfort in the dying process. She was successfully extubated and appears comfortable with no gurgling, respiratory distress, or facial expression at this time.     A total of 17 min was spent on advance care planning, goals of care discussion, emotional support, formulating and communicating prognosis and exploring burden/benefit of various approaches of treatment. This discussion occurred on a fully voluntary basis with the verbal consent of the patient and/or family.     Tali Leal, [facility]-  Critical Care Medicine  [facility]

## 2025-03-11 NOTE — DISCHARGE SUMMARY
[facility] ([facility])  Critical Care Medicine  Discharge Summary      Patient Name: Serina Barriga  MRN: [id number]  Admission Date: 3/9/2025  Hospital Length of Stay: 1 days  Discharge Date and Time: No discharge date for patient encounter.  Attending Physician: Chin Gongora MD   Discharging Provider: Tali Leal NP  Primary Care Provider: Obed Alford MD  Reason for Admission: Neutropenic fever, septic shock    HPI:   50 year old female, comorbid conditions include Breast Cancer, Transaminitis, hyperbilirubinemia. Presented to Oncology clinic with c/o nausea/vomiting, diarrhea. Patient s/p first cycle chemotherapy on 2/24 with docetaxel. Planned for anti-her2 therapy as OP. Patient has known lesions to liver, MRI liver on 2/24 did not show obstructive process.She was discharged recently on 3/7 with CINDI better and resolution of hypotension and electrolyte abn. Now with worsening mucositis, hypotension, epistaxis, mucosal bleeding and weakness. INR, Cr, BUN, Hb, Bicarbonate, LFTs and bilirubin all worse since the day of discharge 48 hrs ago. She is deeply jaundiced and coagulopathic.    * No surgery found *    Indwelling Lines/Drains at Time of Discharge:   Lines/Drains/Airways       Central Venous Catheter Line  Duration                  PowerPort A Cath Single Lumen 02/26/25 1050 Internal Jugular Right 12 days    Percutaneous Central Line - Triple Lumen  03/09/25 1847 Internal Jugular Right 1 day              Drain  Duration                  Urethral Catheter 03/09/25 1800 1 day              Arterial Line  Duration             Arterial Line 03/09/25 2200 Right 1 day                  Hospital Course:   03/10/2025: Requiring levo/vaso support. Temp has improved throughout morning. 50/5 on vent. Heavy oral bleeding overnight. H/H 7.6/21.2, plts 69. Cr worsening. Ammonia up to 85. LFTs trending up. Ongoing discussions with /family and have elected DNR status.   10:36 PM- patient  was transitioned to comfort measures this afternoon and  at 2232 with family at bedside. She was pronounced by Dr. Jolly.       Consults (From admission, onward)          Status Ordering Provider     Inpatient consult to Registered Dietitian/Nutritionist  Once        Provider:  (Not yet assigned)    RUBY Arthur          Significant Labs:  All pertinent labs within the past 24 hours have been reviewed.    Significant Imaging:  I have reviewed all pertinent imaging results/findings within the past 24 hours.    Pending Diagnostic Studies:       None          Final Active Diagnoses:    Diagnosis Date Noted POA    PRINCIPAL PROBLEM:  Sepsis with acute renal failure and septic shock [A41.9, R65.21, N17.9] 2025 Yes    Comfort measures only status [Z51.5] 03/10/2025 Not Applicable    Thrombocytopenia [D69.6] 2025 Yes    Mucositis due to chemotherapy [K12.31] 2025 Yes    Epistaxis [R04.0] 2025 Yes    Goals of care, counseling/discussion [Z71.89] 2025 Not Applicable    Acute liver failure without hepatic coma [K72.00] 2025 Yes    CINDI (acute kidney injury) [N17.9] 2025 Yes    Malignant neoplasm of overlapping sites of left breast in female, estrogen receptor negative [C50.812, Z17.1] 2025 Not Applicable    Transaminitis [R74.01] 2025 Yes      Problems Resolved During this Admission:     No new Assessment & Plan notes have been filed under this hospital service since the last note was generated.  Service: Pulmonology    Discharged Condition:     Disposition:     Tali Leal NP  Critical Care Medicine  [facility] ([facility])

## 2025-03-11 NOTE — NURSING
Received patient at start of shift, notified of comfort care status with plan to continue ventilator settings and continuous medications for comfort, additional PRN morphine given per MAR for comfort. Family at bedside. Questions encouraged and answered.    Ventilator settings changed after discussion with NP and family. Continuing comfort care measures and medications.     Approx 2200 ventilator alarming for apnea, NP notified and come to bedside.     2230 NP and physician at bedside, see note.  Family notified at bedside, questions encouraged and answered.

## 2025-03-11 NOTE — CARE UPDATE
Called to pronounce patient at bedside. No spontaneous breath/cardiac sounds auscultated. She did not respond to pain. Arterial line flat. Asystole on monitor. Patient was pronounced at 2232 on 3/10/2025.     Pronounced performed as courtesy. Please send LEEDIMPLE documentation to attending Dr. Gongora.

## 2025-03-12 LAB
BACTERIA SPEC AEROBE CULT: NO GROWTH
GRAM STN SPEC: NORMAL
MRSA SPEC QL CULT: NORMAL

## 2025-03-13 LAB
BLD PROD TYP BPU: NORMAL
BLOOD UNIT EXPIRATION DATE: NORMAL
BLOOD UNIT TYPE CODE: 7300
BLOOD UNIT TYPE: NORMAL
CODING SYSTEM: NORMAL
CROSSMATCH INTERPRETATION: NORMAL
DISPENSE STATUS: NORMAL
NUM UNITS TRANS PACKED RBC: NORMAL

## 2025-03-15 LAB
BACTERIA BLD CULT: NORMAL
BACTERIA BLD CULT: NORMAL

## 2025-03-31 ENCOUNTER — DOCUMENTATION ONLY (OUTPATIENT)
Dept: GENETICS | Facility: CLINIC | Age: 50
End: 2025-03-31
Payer: COMMERCIAL

## 2025-03-31 NOTE — LETTER
[facility]  [address]  [place] [address]  Phone: [phone]  Fax: [phone]     Dear relative,     Serina Barriga had the CancerNext through [employer] which included the following genes: APC, DELFINA, BAP1, BARD1, BMPR1A, BRCA1, BRCA2, BRIP1, CDH1, CDKN2A, CHEK2, FH, FLCN, MET, MLH1, MSH2, MSH6, MUTYH, NF1, NTHL1, PALB2, PMS2, PTEN, RAD51C, RAD51D, SMAD4, STK11, TP53, TSC1, TSC2 and VHL (sequencing and deletion/duplication); AXIN2, HOXB13, MBD4, MSH3, POLD1 and POLE (sequencing only); EPCAM and GREM1 (deletion/duplication only). RNA data is routinely analyzed for use in variant interpretation for all genes..     The genetic testing was negative. The lab did not report any pathogenic variants (harmful differences also called mutations) in any of the tested genes.  A copy of Serina's genetic testing results will be in her [facility] medical record and mailed to her family.     This result reduces the chance that Serina was born with a mutation in any of these genes that would increase the risk of cancer. Most cancers are not related to being born with a gene mutation. However, having a family history of cancer may still impact the risk of cancer because we often have other things in common with our family like environment or lifestyle.     It is still possible that:   Your relatives who have a history of cancer could have a mutation related to cancer risk that you do not have.  You and/or your relatives could have a mutation in a gene that has not been associated with cancer or that was not tested.  You and/or your relatives could have a mutation that current technology is not able to detect.  Testing other people in your family who have had cancer may help provide more information about what this result means for you or for other family members.    Since Serina's test was negative, her daughters would not need genetic testing related to her diagnosis at this time. However,  genetic testing may still be needed based on other family history.     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 This duplication is most likely from circulating tumor cells and not part of Serina's genetic makeup. The duplication of NF1 and RAD51D are classified as a variant of unknown significance or VUS. A variant on unknown significance means lab does not yet have enough information about this variant to know what it means for you. This is a common result. Most variants are harmless (benign). However, some are harmful (pathogenic) and increase the risk of cancer. The lab will continue to get more data until they can determine if this variant is benign or pathogenic.  Most of the time, when there is an update, the variant is found to be benign. As such, we do not use a VUS to make decisions about medical care.    Recommendations for Family Members  Joses relatives do not need to seek genetic testing based on her cancer or genetic test result, however, they can seek genetic testing based on other personal or family history of cancer. Increased breast cancer screening may be recommended for Serina's relatives, even with a result of no or unknown significance. Relatives can talk to their health care providers about a breast cancer risk assessment or updated genetic testing. In general, high-risk breast cancer screening is recommended to start at 40 or 10 years prior to the earliest breast cancer diagnosis.       Please message me back or call ([phone]) with any questions or any new information about your personal or family history as this could change our assessment.  This assessment is based on the history and reports provided, as well as the current scientific knowledge regarding cancer genetics. As our genetic knowledge and technology is evolving, we recommend checking in with genetics for  updated information.    Kelsea Mcrae [facility] Cancer Genetic Counselor   [phone]

## 2025-03-31 NOTE — PROGRESS NOTES
Cancer Genetics  Hereditary/High Risk Clinic  Department of Hematology and Oncology  [facility]        Date of Service:  03/31/2025  Provider:  Kelsea Mcrae MS, [facility]  Collaborating Physician: Dr. Sameer Figueroa    Serina Barriga had the CancerNext through [employer] which included the following genes: APC, DELFINA, BAP1, BARD1, BMPR1A, BRCA1, BRCA2, BRIP1, CDH1, CDKN2A, CHEK2, FH, FLCN, MET, MLH1, MSH2, MSH6, MUTYH, NF1, NTHL1, PALB2, PMS2, PTEN, RAD51C, RAD51D, SMAD4, STK11, TP53, TSC1, TSC2 and VHL (sequencing and deletion/duplication); AXIN2, HOXB13, MBD4, MSH3, POLD1 and POLE (sequencing only); EPCAM and GREM1 (deletion/duplication only). RNA data is routinely analyzed for use in variant interpretation for all genes..     The genetic testing was negative. The lab did not report any pathogenic variants (harmful differences also called mutations) in any of the tested genes.  A copy of Serina's genetic testing results will be in her [facility] medical record and mailed to her family.     This result reduces the chance that Serina was born with a mutation in any of these genes that would increase the risk of cancer. Most cancers are not related to being born with a gene mutation. However, having a family history of cancer may still impact the risk of cancer because we often have other things in common with our family like environment or lifestyle.     It is still possible that:   Your relatives who have a history of cancer could have a mutation related to cancer risk that you do not have.  You and/or your relatives could have a mutation in a gene that has not been associated with cancer or that was not tested.  You and/or your relatives could have a mutation that current technology is not able to detect.  Testing other people in your family who have had cancer may help provide more information about what this result means for you or for other family members.    Since Serina's test was negative, her  daughters would not need genetic testing related to her diagnosis at this time. However, genetic testing may still be needed based on other family history.     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 This duplication is most likely from circulating tumor cells and not part of Serina's genetic makeup. The duplication of NF1 and RAD51D are classified as a variant of unknown significance or VUS. A variant on unknown significance means lab does not yet have enough information about this variant to know what it means for you. This is a common result. Most variants are harmless (benign). However, some are harmful (pathogenic) and increase the risk of cancer. The lab will continue to get more data until they can determine if this variant is benign or pathogenic.  Most of the time, when there is an update, the variant is found to be benign. As such, we do not use a VUS to make decisions about medical care.    Recommendations for Family Members  Serina's relatives do not need to seek genetic testing based on her cancer or genetic test result, however, they can seek genetic testing based on other personal or family history of cancer. Increased breast cancer screening may be recommended for Serina's relatives, even with a result of no or unknown significance. Relatives can talk to their health care providers about a breast cancer risk assessment or updated genetic testing. In general, high-risk breast cancer screening is recommended to start at 40 or 10 years prior to the earliest breast cancer diagnosis.       Please message me back or call ([phone]) with any questions or any new information about your personal or family history as this could change our assessment.  This assessment is based on the history and reports provided, as well as the current scientific knowledge regarding cancer genetics. As our  genetic knowledge and technology is evolving, we recommend checking in with genetics for updated information.    Kelsea Mcrae, [facility] Cancer Genetic Counselor   [phone]    Serina's genetic test results were shared with her . A copy of Serina's genetic test result and a family letter was mailed out to him.